# Patient Record
Sex: FEMALE | Race: WHITE | Employment: OTHER | URBAN - METROPOLITAN AREA
[De-identification: names, ages, dates, MRNs, and addresses within clinical notes are randomized per-mention and may not be internally consistent; named-entity substitution may affect disease eponyms.]

---

## 2017-01-09 DIAGNOSIS — Z98.61 S/P PTCA (PERCUTANEOUS TRANSLUMINAL CORONARY ANGIOPLASTY): Primary | ICD-10-CM

## 2017-01-09 RX ORDER — PRASUGREL 10 MG/1
10 TABLET, FILM COATED ORAL DAILY
Qty: 28 TABLET | Refills: 0 | COMMUNITY
Start: 2017-01-09 | End: 2017-03-20 | Stop reason: SDUPTHER

## 2017-01-23 ENCOUNTER — OFFICE VISIT (OUTPATIENT)
Dept: CARDIOLOGY CLINIC | Age: 67
End: 2017-01-23

## 2017-01-23 VITALS
BODY MASS INDEX: 43.05 KG/M2 | WEIGHT: 243 LBS | SYSTOLIC BLOOD PRESSURE: 122 MMHG | DIASTOLIC BLOOD PRESSURE: 70 MMHG | HEIGHT: 63 IN | HEART RATE: 64 BPM

## 2017-01-23 DIAGNOSIS — R07.9 CHEST PAIN, UNSPECIFIED TYPE: Primary | ICD-10-CM

## 2017-01-23 PROCEDURE — 99214 OFFICE O/P EST MOD 30 MIN: CPT | Performed by: INTERNAL MEDICINE

## 2017-01-23 PROCEDURE — 93000 ELECTROCARDIOGRAM COMPLETE: CPT | Performed by: INTERNAL MEDICINE

## 2017-01-23 RX ORDER — QUINAPRIL 20 MG/1
TABLET ORAL
Refills: 3 | COMMUNITY
Start: 2016-12-19 | End: 2017-01-23 | Stop reason: ALTCHOICE

## 2017-01-23 RX ORDER — MECLIZINE HYDROCHLORIDE 25 MG/1
TABLET ORAL
Refills: 3 | COMMUNITY
Start: 2016-11-09 | End: 2022-04-07

## 2017-01-23 RX ORDER — QUINAPRIL 40 MG/1
40 TABLET ORAL NIGHTLY
COMMUNITY
End: 2017-10-17 | Stop reason: SDUPTHER

## 2017-01-26 ENCOUNTER — PROCEDURE VISIT (OUTPATIENT)
Dept: CARDIOLOGY CLINIC | Age: 67
End: 2017-01-26

## 2017-01-26 DIAGNOSIS — R07.9 CHEST PAIN, UNSPECIFIED TYPE: ICD-10-CM

## 2017-01-26 DIAGNOSIS — I25.10 CORONARY ARTERY DISEASE INVOLVING NATIVE CORONARY ARTERY OF NATIVE HEART, ANGINA PRESENCE UNSPECIFIED: Primary | ICD-10-CM

## 2017-01-26 DIAGNOSIS — I25.10 CORONARY ARTERY DISEASE INVOLVING NATIVE CORONARY ARTERY OF NATIVE HEART, ANGINA PRESENCE UNSPECIFIED: ICD-10-CM

## 2017-01-26 DIAGNOSIS — R07.89 OTHER CHEST PAIN: Primary | ICD-10-CM

## 2017-01-26 LAB
LV EF: 55 %
LVEF MODALITY: NORMAL

## 2017-01-26 PROCEDURE — 93015 CV STRESS TEST SUPVJ I&R: CPT | Performed by: INTERNAL MEDICINE

## 2017-01-26 PROCEDURE — 93306 TTE W/DOPPLER COMPLETE: CPT | Performed by: INTERNAL MEDICINE

## 2017-01-26 PROCEDURE — A9500 TC99M SESTAMIBI: HCPCS | Performed by: INTERNAL MEDICINE

## 2017-01-26 PROCEDURE — 78452 HT MUSCLE IMAGE SPECT MULT: CPT | Performed by: INTERNAL MEDICINE

## 2017-01-30 ENCOUNTER — TELEPHONE (OUTPATIENT)
Dept: CARDIOLOGY CLINIC | Age: 67
End: 2017-01-30

## 2017-01-31 ENCOUNTER — OFFICE VISIT (OUTPATIENT)
Dept: CARDIOLOGY CLINIC | Age: 67
End: 2017-01-31

## 2017-01-31 VITALS
WEIGHT: 240 LBS | BODY MASS INDEX: 44.16 KG/M2 | HEIGHT: 62 IN | SYSTOLIC BLOOD PRESSURE: 138 MMHG | HEART RATE: 88 BPM | DIASTOLIC BLOOD PRESSURE: 88 MMHG

## 2017-01-31 DIAGNOSIS — Z98.61 S/P PTCA (PERCUTANEOUS TRANSLUMINAL CORONARY ANGIOPLASTY): Primary | ICD-10-CM

## 2017-01-31 PROCEDURE — 99214 OFFICE O/P EST MOD 30 MIN: CPT | Performed by: INTERNAL MEDICINE

## 2017-02-07 ENCOUNTER — TELEPHONE (OUTPATIENT)
Dept: CARDIOLOGY CLINIC | Age: 67
End: 2017-02-07

## 2017-02-20 ENCOUNTER — TELEPHONE (OUTPATIENT)
Dept: CARDIOLOGY CLINIC | Age: 67
End: 2017-02-20

## 2017-02-28 ENCOUNTER — TELEPHONE (OUTPATIENT)
Dept: CARDIOLOGY CLINIC | Age: 67
End: 2017-02-28

## 2017-03-07 ENCOUNTER — TELEPHONE (OUTPATIENT)
Dept: CARDIOLOGY CLINIC | Age: 67
End: 2017-03-07

## 2017-03-08 ENCOUNTER — HOSPITAL ENCOUNTER (OUTPATIENT)
Dept: GENERAL RADIOLOGY | Age: 67
Discharge: HOME | End: 2017-03-08
Attending: FAMILY MEDICINE | Admitting: FAMILY MEDICINE

## 2017-03-08 LAB
ALBUMIN SERPL-MCNC: 4.3 GM/DL (ref 3.4–5)
ALP BLD-CCNC: 80 IU/L (ref 40–128)
ALT SERPL-CCNC: 29 U/L (ref 10–40)
ANION GAP SERPL CALCULATED.3IONS-SCNC: 12 MMOL/L (ref 4–16)
AST SERPL-CCNC: 30 IU/L (ref 15–37)
BACTERIA: NEGATIVE /HPF
BASOPHILS ABSOLUTE: 0.1 K/CU MM
BASOPHILS RELATIVE PERCENT: 0.9 % (ref 0–1)
BILIRUB SERPL-MCNC: 0.5 MG/DL (ref 0–1)
BILIRUBIN URINE: NEGATIVE MG/DL
BLOOD, URINE: NEGATIVE
BUN BLDV-MCNC: 13 MG/DL (ref 6–23)
CALCIUM SERPL-MCNC: 9.6 MG/DL (ref 8.3–10.6)
CAST TYPE: ABNORMAL /HPF
CHLORIDE BLD-SCNC: 98 MMOL/L (ref 99–110)
CHOLESTEROL: 123 MG/DL
CLARITY: ABNORMAL
CO2: 31 MMOL/L (ref 21–32)
COLOR: YELLOW
CREAT SERPL-MCNC: 0.7 MG/DL (ref 0.6–1.1)
CRYSTAL TYPE: ABNORMAL /HPF
DIFFERENTIAL TYPE: ABNORMAL
EOSINOPHILS ABSOLUTE: 0.3 K/CU MM
EOSINOPHILS RELATIVE PERCENT: 3.1 % (ref 0–3)
EPITHELIAL CELLS, UA: 1
EPITHELIAL CELLS, UA: 25 /HPF
ESTIMATED AVERAGE GLUCOSE: 166 MG/DL
FERRITIN: 23 NG/ML (ref 15–150)
GFR AFRICAN AMERICAN: >60 ML/MIN/1.73M2
GFR NON-AFRICAN AMERICAN: >60 ML/MIN/1.73M2
GLUCOSE BLD-MCNC: 129 MG/DL (ref 70–140)
GLUCOSE, URINE: NEGATIVE MG/DL
HBA1C MFR BLD: 7.4 % (ref 4.2–6.3)
HCT VFR BLD CALC: 45.5 % (ref 37–47)
HDLC SERPL-MCNC: 47 MG/DL
HEMOGLOBIN: 13.4 GM/DL (ref 12.5–16)
IMMATURE NEUTROPHIL %: 0.3 % (ref 0–0.43)
IRON: 37 UG/DL (ref 37–145)
KETONES, URINE: NEGATIVE MG/DL
LDL CHOLESTEROL DIRECT: 61 MG/DL
LEUKOCYTE ESTERASE, URINE: ABNORMAL
LYMPHOCYTES ABSOLUTE: 3 K/CU MM
LYMPHOCYTES RELATIVE PERCENT: 32.9 % (ref 24–44)
MCH RBC QN AUTO: 25.6 PG (ref 27–31)
MCHC RBC AUTO-ENTMCNC: 29.5 % (ref 32–36)
MCV RBC AUTO: 87 FL (ref 78–100)
MONOCYTES ABSOLUTE: 0.8 K/CU MM
MONOCYTES RELATIVE PERCENT: 8.3 % (ref 0–4)
MUCUS: ABNORMAL HPF
NITRITE URINE, QUANTITATIVE: NEGATIVE
NUCLEATED RBC %: 0 %
PCT TRANSFERRIN: 10 % (ref 10–44)
PDW BLD-RTO: 15.1 % (ref 11.7–14.9)
PH, URINE: 7 (ref 5–8)
PLATELET # BLD: 232 K/CU MM (ref 140–440)
PMV BLD AUTO: 12.1 FL (ref 7.5–11.1)
POTASSIUM SERPL-SCNC: 4.5 MMOL/L (ref 3.5–5.1)
PROTEIN UA: 30 MG/DL
RBC # BLD: 5.23 M/CU MM (ref 4.2–5.4)
RBC URINE: 7 /HPF (ref 0–6)
SEGMENTED NEUTROPHILS ABSOLUTE COUNT: 5 K/CU MM
SEGMENTED NEUTROPHILS RELATIVE PERCENT: 54.5 % (ref 36–66)
SODIUM BLD-SCNC: 141 MMOL/L (ref 135–145)
SPECIFIC GRAVITY UA: 1.01 (ref 1–1.03)
TOTAL IMMATURE NEUTOROPHIL: 0.03 K/CU MM
TOTAL IRON BINDING CAPACITY: 364 UG/DL (ref 250–450)
TOTAL NUCLEATED RBC: 0 K/CU MM
TOTAL PROTEIN: 6.8 GM/DL (ref 6.4–8.2)
TRIGL SERPL-MCNC: 156 MG/DL
UNSATURATED IRON BINDING CAPACITY: 327 UG/DL (ref 110–370)
UROBILINOGEN, URINE: 0.2 EU/DL (ref 0.2–1)
VITAMIN B-12: 290.7 PG/ML (ref 211–911)
VITAMIN D 25-HYDROXY: 18.95 NG/ML
VOLUME, (UVOL): 12 ML (ref 10–12)
WBC # BLD: 9.1 K/CU MM (ref 4–10.5)
WBC UA: 15 /HPF (ref 0–5)

## 2017-03-10 LAB
CULTURE: NORMAL
REPORT STATUS: NORMAL
REQUEST PROBLEM: NORMAL
SPECIMEN: NORMAL
TOTAL COLONY COUNT: NORMAL

## 2017-03-11 LAB — VITAMIN B6: 33.8

## 2017-03-13 ENCOUNTER — HOSPITAL ENCOUNTER (OUTPATIENT)
Dept: GENERAL RADIOLOGY | Age: 67
Discharge: HOME | End: 2017-03-13
Attending: INTERNAL MEDICINE | Admitting: INTERNAL MEDICINE

## 2017-03-13 DIAGNOSIS — Z01.811 PRE-OP CHEST EXAM: ICD-10-CM

## 2017-03-13 LAB
ANION GAP SERPL CALCULATED.3IONS-SCNC: 12 MMOL/L (ref 4–16)
APTT: 23.3 SECONDS (ref 21.2–33)
BUN BLDV-MCNC: 10 MG/DL (ref 6–23)
CALCIUM SERPL-MCNC: 9.5 MG/DL (ref 8.3–10.6)
CHLORIDE BLD-SCNC: 99 MMOL/L (ref 99–110)
CO2: 30 MMOL/L (ref 21–32)
CREAT SERPL-MCNC: 0.7 MG/DL (ref 0.6–1.1)
GFR AFRICAN AMERICAN: >60 ML/MIN/1.73M2
GFR NON-AFRICAN AMERICAN: >60 ML/MIN/1.73M2
GLUCOSE BLD-MCNC: 136 MG/DL (ref 70–140)
HCT VFR BLD CALC: 42.7 % (ref 37–47)
HEMOGLOBIN: 12.8 GM/DL (ref 12.5–16)
INR BLD: 0.97 INDEX
MCH RBC QN AUTO: 26 PG (ref 27–31)
MCHC RBC AUTO-ENTMCNC: 30 % (ref 32–36)
MCV RBC AUTO: 86.8 FL (ref 78–100)
PDW BLD-RTO: 15.2 % (ref 11.7–14.9)
PLATELET # BLD: 222 K/CU MM (ref 140–440)
PMV BLD AUTO: 12.3 FL (ref 7.5–11.1)
POTASSIUM SERPL-SCNC: 4.5 MMOL/L (ref 3.5–5.1)
PROTHROMBIN TIME: 11.1 SECONDS
RBC # BLD: 4.92 M/CU MM (ref 4.2–5.4)
SODIUM BLD-SCNC: 141 MMOL/L (ref 135–145)
WBC # BLD: 6.8 K/CU MM (ref 4–10.5)

## 2017-03-14 ENCOUNTER — TELEPHONE (OUTPATIENT)
Dept: CARDIOLOGY CLINIC | Age: 67
End: 2017-03-14

## 2017-03-20 ENCOUNTER — OFFICE VISIT (OUTPATIENT)
Dept: CARDIOLOGY CLINIC | Age: 67
End: 2017-03-20

## 2017-03-20 VITALS
SYSTOLIC BLOOD PRESSURE: 128 MMHG | BODY MASS INDEX: 44.16 KG/M2 | OXYGEN SATURATION: 98 % | DIASTOLIC BLOOD PRESSURE: 80 MMHG | WEIGHT: 240 LBS | HEIGHT: 62 IN | HEART RATE: 62 BPM

## 2017-03-20 DIAGNOSIS — I25.10 CORONARY ARTERY DISEASE INVOLVING NATIVE CORONARY ARTERY OF NATIVE HEART WITHOUT ANGINA PECTORIS: ICD-10-CM

## 2017-03-20 DIAGNOSIS — Z98.890 S/P CARDIAC CATH: Primary | ICD-10-CM

## 2017-03-20 DIAGNOSIS — E78.5 HYPERLIPIDEMIA, UNSPECIFIED HYPERLIPIDEMIA TYPE: ICD-10-CM

## 2017-03-20 DIAGNOSIS — E11.9 TYPE 2 DIABETES MELLITUS WITHOUT COMPLICATION, WITHOUT LONG-TERM CURRENT USE OF INSULIN (HCC): ICD-10-CM

## 2017-03-20 DIAGNOSIS — Z98.61 S/P PTCA (PERCUTANEOUS TRANSLUMINAL CORONARY ANGIOPLASTY): ICD-10-CM

## 2017-03-20 DIAGNOSIS — I10 ESSENTIAL HYPERTENSION: ICD-10-CM

## 2017-03-20 PROCEDURE — 99214 OFFICE O/P EST MOD 30 MIN: CPT | Performed by: INTERNAL MEDICINE

## 2017-03-20 RX ORDER — PRASUGREL 10 MG/1
10 TABLET, FILM COATED ORAL DAILY
Qty: 96 TABLET | Refills: 0 | COMMUNITY
Start: 2017-03-20 | End: 2017-06-01 | Stop reason: SDUPTHER

## 2017-04-03 ENCOUNTER — TELEPHONE (OUTPATIENT)
Dept: GASTROENTEROLOGY | Age: 67
End: 2017-04-03

## 2017-04-05 ENCOUNTER — OFFICE VISIT (OUTPATIENT)
Dept: ORTHOPEDIC SURGERY | Age: 67
End: 2017-04-05

## 2017-04-05 VITALS — BODY MASS INDEX: 44.16 KG/M2 | WEIGHT: 240 LBS | HEIGHT: 62 IN | RESPIRATION RATE: 16 BRPM

## 2017-04-05 DIAGNOSIS — M16.12 ARTHRITIS OF LEFT HIP: Primary | ICD-10-CM

## 2017-04-05 PROBLEM — F99 MENTAL DISORDER: Status: ACTIVE | Noted: 2017-04-05

## 2017-04-05 PROBLEM — N28.9 KIDNEY DISORDER: Status: ACTIVE | Noted: 2017-04-05

## 2017-04-05 PROCEDURE — 99213 OFFICE O/P EST LOW 20 MIN: CPT | Performed by: ORTHOPAEDIC SURGERY

## 2017-05-01 ENCOUNTER — TELEPHONE (OUTPATIENT)
Dept: GASTROENTEROLOGY | Age: 67
End: 2017-05-01

## 2017-05-26 ENCOUNTER — TELEPHONE (OUTPATIENT)
Dept: GASTROENTEROLOGY | Age: 67
End: 2017-05-26

## 2017-05-31 ENCOUNTER — TELEPHONE (OUTPATIENT)
Dept: CARDIOLOGY CLINIC | Age: 67
End: 2017-05-31

## 2017-05-31 NOTE — TELEPHONE ENCOUNTER
Dr. Handley Going is on vacation so I will talk to one of the Doctors here to get an answer for this pt.

## 2017-06-01 NOTE — TELEPHONE ENCOUNTER
I found some more samples of Effient so pt is going to picked them up tomorrow and she knows I will ask Dr. Pop Bears about her stopping Effient or changing it to another med after he returns from vacation.

## 2017-06-06 RX ORDER — PRASUGREL 10 MG/1
10 TABLET, FILM COATED ORAL DAILY
Qty: 28 TABLET | Refills: 0 | COMMUNITY
Start: 2017-06-06 | End: 2017-06-13 | Stop reason: ALTCHOICE

## 2017-06-16 ENCOUNTER — HOSPITAL ENCOUNTER (OUTPATIENT)
Dept: GENERAL RADIOLOGY | Age: 67
Discharge: HOME | End: 2017-06-16
Attending: FAMILY MEDICINE | Admitting: FAMILY MEDICINE

## 2017-06-16 LAB
ALBUMIN SERPL-MCNC: 3.7 GM/DL (ref 3.4–5)
ALP BLD-CCNC: 90 IU/L (ref 40–129)
ALT SERPL-CCNC: 20 U/L (ref 10–40)
ANION GAP SERPL CALCULATED.3IONS-SCNC: 11 MMOL/L (ref 4–16)
AST SERPL-CCNC: 17 IU/L (ref 15–37)
BILIRUB SERPL-MCNC: 0.4 MG/DL (ref 0–1)
BUN BLDV-MCNC: 17 MG/DL (ref 6–23)
CALCIUM SERPL-MCNC: 9.3 MG/DL (ref 8.3–10.6)
CHLORIDE BLD-SCNC: 101 MMOL/L (ref 99–110)
CHOLESTEROL: 117 MG/DL
CO2: 31 MMOL/L (ref 21–32)
CREAT SERPL-MCNC: 0.7 MG/DL (ref 0.6–1.1)
ESTIMATED AVERAGE GLUCOSE: 163 MG/DL
GFR AFRICAN AMERICAN: >60 ML/MIN/1.73M2
GFR NON-AFRICAN AMERICAN: >60 ML/MIN/1.73M2
GLUCOSE BLD-MCNC: 157 MG/DL (ref 70–140)
HBA1C MFR BLD: 7.3 % (ref 4.2–6.3)
HDLC SERPL-MCNC: 56 MG/DL
LDL CHOLESTEROL DIRECT: 53 MG/DL
POTASSIUM SERPL-SCNC: 4.4 MMOL/L (ref 3.5–5.1)
SODIUM BLD-SCNC: 143 MMOL/L (ref 135–145)
T3 FREE: 2.7 PG/ML (ref 2.3–4.2)
T4 FREE: 1.69 NG/DL (ref 0.9–1.8)
TOTAL PROTEIN: 6.2 GM/DL (ref 6.4–8.2)
TRIGL SERPL-MCNC: 96 MG/DL
TSH HIGH SENSITIVITY: 0.39 UIU/ML (ref 0.27–4.2)
VITAMIN D 25-HYDROXY: 16.36 NG/ML

## 2017-10-17 ENCOUNTER — OFFICE VISIT (OUTPATIENT)
Dept: CARDIOLOGY CLINIC | Age: 67
End: 2017-10-17

## 2017-10-17 VITALS
DIASTOLIC BLOOD PRESSURE: 72 MMHG | HEIGHT: 63 IN | BODY MASS INDEX: 42.52 KG/M2 | SYSTOLIC BLOOD PRESSURE: 122 MMHG | WEIGHT: 240 LBS | OXYGEN SATURATION: 98 % | HEART RATE: 67 BPM

## 2017-10-17 DIAGNOSIS — E11.9 TYPE 2 DIABETES MELLITUS WITHOUT COMPLICATION, WITHOUT LONG-TERM CURRENT USE OF INSULIN (HCC): ICD-10-CM

## 2017-10-17 DIAGNOSIS — I25.10 CORONARY ARTERY DISEASE INVOLVING NATIVE CORONARY ARTERY OF NATIVE HEART WITHOUT ANGINA PECTORIS: Primary | ICD-10-CM

## 2017-10-17 DIAGNOSIS — Z98.61 S/P PTCA (PERCUTANEOUS TRANSLUMINAL CORONARY ANGIOPLASTY): ICD-10-CM

## 2017-10-17 DIAGNOSIS — E78.49 OTHER HYPERLIPIDEMIA: ICD-10-CM

## 2017-10-17 DIAGNOSIS — I10 ESSENTIAL HYPERTENSION: ICD-10-CM

## 2017-10-17 PROCEDURE — 99214 OFFICE O/P EST MOD 30 MIN: CPT | Performed by: INTERNAL MEDICINE

## 2017-10-17 RX ORDER — ATORVASTATIN CALCIUM 20 MG/1
20 TABLET, FILM COATED ORAL NIGHTLY
Qty: 90 TABLET | Refills: 3 | Status: SHIPPED | OUTPATIENT
Start: 2017-10-17

## 2017-10-17 RX ORDER — CARVEDILOL 12.5 MG/1
12.5 TABLET ORAL 2 TIMES DAILY WITH MEALS
Qty: 1180 TABLET | Refills: 3 | Status: SHIPPED | OUTPATIENT
Start: 2017-10-17

## 2017-10-17 RX ORDER — QUINAPRIL 40 MG/1
40 TABLET ORAL NIGHTLY
Qty: 90 TABLET | Refills: 3 | Status: SHIPPED | OUTPATIENT
Start: 2017-10-17

## 2017-10-17 RX ORDER — HYDROCHLOROTHIAZIDE 25 MG/1
25 TABLET ORAL DAILY
Qty: 90 TABLET | Refills: 3 | Status: SHIPPED | OUTPATIENT
Start: 2017-10-17

## 2017-10-17 NOTE — PROGRESS NOTES
Patient's medical history is documented as below. Patient confirms taking her medication as prescribed. Labs and recent testing results have been reviewed. She she is here for 6 months follow-up  She has coronary artery disease, status post PTCA, hypertension, hyperlipidemia and type 2 diabetes mellitus. Denies chest pain or shortness of breath. Denies palpitations. Reviewed her current medications and labs. ROS    Constitutional:  Denies fever, chills, weight loss or weakness. HENT:  Denies sore throat or ear pain. Respiratory:  Denies cough or shortness of breath. Cardiovascular:  Denies chest pain, palpitations or swelling. GI:  Denies abdominal pain, nausea, vomiting, or diarrhea. Musculoskeletal:  Denies back pain. Skin:  Denies rash. Neurologic:  Denies headache, focal weakness or sensory changes. Endocrine:  Denies polyuria or polydipsia. Lymphatic:  Denies swollen glands. All other systems in a complete (14 organ system) ROS, except for pertinent positives and/or negatives as noted in the HPI (or elsewhere in my note), have been reviewed and are negative. PAST MEDICAL HISTORY    Past Medical History:   Diagnosis Date    Acid reflux     Angina at rest [facility]) In Past    Denies Chest Pain At This Time    Arthritis     \"Knees, Back And Hips\"    Broken teeth     Upper    CAD (coronary artery disease)     Sees Dr. Maxim Mcgill Chronic back pain     Degenerative joint disease     Back     Diabetes mellitus ([facility] Dx 2003    GI bleed 11-09    H/O echocardiogram 07/14/2014    EF 50%, low normal systolic function, no wall motion abnormalities, mild concentric hypertrophy and signs of diastolic dysfunction, mildly dilated left atrium,  no pericardial effusion, mild mitral and tricuspid regurg.     Hiatal hernia     History of complete ECG     07/20/2012=NSR, leftward axis, prob normal for age, poor r-waveprogression, inferior ST-T abnormalities, consider ischemia    History of echocardiogram 01/26/2017    Ejection fraction is visually estimated at 55%. Mild concentric left ventricular hypertrophy. Mildly dilated left atrium. Mild mitral regurgitation is present.  History of nuclear stress test 01/26/2017    cardiolite-moderate ischemia mid anteroseptal,EF70%    Hx of 24 hour EKG monitoring 12/21/2011    brief run of SVT, otherwise noth clinically sig. arrhythmia noted    Hx of cardiovascular stress test 7/25/14 1/23/12 7/14 EF70% normal study 1/23/12=thallium stress=no scintigraphic inducible myocardial ischemia, EF 70%;  03/2011=EF 70%; 03/2010=EF 70%; 12/1997; 02/1997    Hx of CT scan     05/08/2011=CT chest with contrast=examination is moderately degraded by respiratory motion. No evidence for acute pulmonary thromboembolic disease.  Dilated main pulmonary artery suggesting elevated pulm artery pressures    Hx of Doppler ultrasound     bilateral carotid 03/11/2011= no significant stenosis present    Hx of echocardiogram     03/11/2011=mildly hypertreophied left ventricle, EF 60%, mild pulm. htn, mild aortic stenosis; 02/1997   [facility] OTHER MEDICAL     Primary Care Physician Is Dr. Chapito Peoples Hyperlipidemia     Hypertension     Hyperthyroidism     Hypothyroidism, adult     Kidney stones Last Episode In 2000's    \"Passed Kidney Stones Three Different Times\"    Left hip pain     \"for months- had hip pain\"for steroid injection 7/5/2016    Migraines     Normal cardiac stress test 05/12/16    EF70% Normal    Post PTCA     2008, 2010, 2016    RECEIVED BLOOD TRANSFUSION 11-09    RECEIVED BLOOD TRANSFUSION, NO REACTION TO THE BLOOD TRANSFUSION RECEIVED    Shortness of breath on exertion     Sleep apnea     NO CPAP    Teeth missing     Upper And Lower    Ulcer ([address] 11-09    Stomach, GI Bleeding, Received Blood Transfusions    Urinary incontinence     UTI (urinary tract infection) In Past     No Current Symptoms    Wears glasses     Wears partial dentures     Upper       MEDICATIONS Current Outpatient Rx   Medication Sig Dispense Refill    metFORMIN (GLUCOPHAGE) 500 MG tablet TK 2 T PO QAM AND 3 T PO QPM WITH MEALS  11    meclizine (ANTIVERT) 25 MG tablet TK 1 T PO  Q 4 H PRF  DIZZINESS  3    quinapril (ACCUPRIL) 40 MG tablet Take 40 mg by mouth nightly      B Complex Vitamins (VITAMIN B COMPLEX PO) Take by mouth      vitamin D (CHOLECALCIFEROL) 1000 UNIT TABS tablet Take 1,000 Units by mouth daily      ondansetron (ZOFRAN) 4 MG tablet Take 1 tablet by mouth every 8 hours as needed for Nausea or Vomiting 30 tablet 3    atorvastatin (LIPITOR) 20 MG tablet Take 1 tablet by mouth nightly 30 tablet 11    ranitidine (ZANTAC) 300 MG tablet TAKE 1 TABLET BY MOUTH EVERY NIGHT 30 tablet 5    HYDROcodone-acetaminophen (NORCO) 5-325 MG per tablet Take 1 tablet by mouth every 6 hours as needed   0    carvedilol (COREG) 12.5 MG tablet Take 12.5 mg by mouth 2 times daily (with meals)      albuterol (PROVENTIL HFA;VENTOLIN HFA) 108 (90 BASE) MCG/ACT inhaler Inhale 2 puffs into the lungs every 6 hours as needed for Wheezing      pantoprazole (PROTONIX) 40 MG tablet Take 40 mg by mouth 2 times daily   2    aspirin 81 MG tablet Take 81 mg by mouth every morning. Last Dose Taken 9-2-14 Due To Scheduled Surgery      glipiZIDE (GLUCOTROL) 5 MG tablet Take 5 mg by mouth 2 times daily (before meals).  levothyroxine (SYNTHROID) 150 MCG tablet Take 150 mcg by mouth nightly.  hydrochlorothiazide (HYDRODIURIL) 25 MG tablet Take 25 mg by mouth daily Takes 12.5 daily 1/2 of a 25 mg tablet      sitaGLIPtan (JANUVIA) 100 MG tablet Take 100 mg by mouth every morning.          ALLERGIES    Allergies   Allergen Reactions    Codeine Nausea Only       FAMILY HISTORY    Family History   Problem Relation Age of Onset    Cancer Mother      Liver Cancer    Heart Disease Mother     High Blood Pressure Mother     Asthma Mother     Cancer Father      Colon Cancer    Heart Disease Father     High Blood Pressure Father     Arthritis Sister     Early Death Sister 52    High Blood Pressure Sister     Other Sister      \"Breathing Problem\"    Heart Disease Son      Open Heart Surgery    Diabetes Son     High Blood Pressure Son     Mental Illness Daughter      Bipolar    Early Death Sister 61     Liver Cancer    Cancer Sister      Liver Cancer       SURGICAL HISTORY    Past Surgical History:   Procedure Laterality Date    CARPAL TUNNEL RELEASE Bilateral  Right     1989 Left     SECTION  2835,     Tubal Ligation Also Done In  With    [address]  Last Done In 's    COLONOSCOPY  3/23/15    Internal hemorrhoids    CORONARY ANGIOPLASTY  2010    balloon angioplasty of distal mid LAD;     CORONARY ANGIOPLASTY  2008    LAD stent 2.75x16 taxus    CORONARY ANGIOPLASTY  2008    2. 5x8 taxus stent to the ostium of the circ.  DENTAL SURGERY      Teeth Extracted In Past    DIAGNOSTIC CARDIAC CATH LAB PROCEDURE  2010    balloon angioplasty of distal mid LAD;     DIAGNOSTIC CARDIAC CATH LAB PROCEDURE  2008    LAD stent 2.75x16 taxus    DIAGNOSTIC CARDIAC CATH LAB PROCEDURE  2008    2. 5x8 taxus stent to the ostium of the circ.     ENDOSCOPY, COLON, DIAGNOSTIC  \"Several\"    ENDOSCOPY, COLON, DIAGNOSTIC  3/23/15    small hiatal hernia, anatomy consistent with banding, gastritis    FINGER TRIGGER RELEASE  10-09 \"One On Each Hand Trigger Finger Release\"    - \"Left Middle Finger Trigger Finger Release\"    HYSTERECTOMY      Partial Abdominal Hysterectomy, \"Pinned The Bladder Up\"    JOINT REPLACEMENT Right     Total Right Hip    JOINT REPLACEMENT Right     Total Right Knee    JOINT REPLACEMENT Left     Total Left Knee    JOINT REPLACEMENT Left     Revision Total Left Knee    KNEE SURGERY Right ,     KNEE SURGERY Left     Left Knee Bone Graft    KNEE SURGERY Right     Right Knee Bone Graft    OTHER SURGICAL HISTORY      \"Stomach Stapling\" Pre Surgery Weight Was 230 lbs    OTHER SURGICAL HISTORY Left 04/10/2017    left hip steroid injection    PTCA  16    Stenting of the LAD.  SHOULDER ARTHROSCOPY Right 2-10    SHOULDER ARTHROSCOPY Left 09/10/14    TONSILLECTOMY AND ADENOIDECTOMY  1964    TUBAL LIGATION  1975    Done With     WISDOM TOOTH EXTRACTION      [place] Teeth Extracted In Past       PHYSICAL EXAM    Vital Signs:  /72   Pulse 67   Ht 5' 3\" (1.6 m)   Wt 240 lb (108.9 kg)   SpO2 98%   BMI 42.51 kg/m²   Constitutional:  Well developed, well nourished, no acute distress, non-toxic appearance. HENT:  Normocephalic, atraumatic, bilateral external ears normal, oropharynx moist, no oral exudates, Nose normal. Neck- normal range of motion, no tenderness, supple, no stridor. Eyes:  PERRL, EOMI, conjunctiva normal, no discharge. Respiratory:  Lungs are clear to auscultation  Cardiovascular:  Regular rhythm no murmur rub or gallop  GI:  Bowel sounds normal, Soft, no tenderness, no masses, no pulsatile masses. Integument:  Warm, dry, no erythema, no rash. Back:  No tenderness, no CVA tenderness. Musculoskeletal:  Intact distal pulses, no edema, no tenderness, no cyanosis, no clubbing. Good range of motion in all major joints. No tenderness to palpation or major deformities noted. Back- No tenderness. Neurologic:  Alert & oriented x 3, normal motor function, normal sensory function, no focal deficits noted. Psychiatric:  Affect normal, judgment normal, mood normal.     AssessmenT    1. Coronary artery disease involving native coronary artery of native heart without angina pectoris    2. S/P PTCA (percutaneous transluminal coronary angioplasty)    3. Type 2 diabetes mellitus without complication, without long-term current use of insulin ([address]    4. Essential hypertension    5.  Other hyperlipidemia        Plan  Continue current medical

## 2017-12-21 ENCOUNTER — TELEPHONE (OUTPATIENT)
Dept: CARDIOLOGY CLINIC | Age: 67
End: 2017-12-21

## 2018-02-28 ENCOUNTER — OFFICE VISIT (OUTPATIENT)
Dept: ORTHOPEDIC SURGERY | Age: 68
End: 2018-02-28

## 2018-02-28 ENCOUNTER — TELEPHONE (OUTPATIENT)
Dept: ORTHOPEDIC SURGERY | Age: 68
End: 2018-02-28

## 2018-02-28 VITALS — RESPIRATION RATE: 16 BRPM | HEIGHT: 63 IN | WEIGHT: 240 LBS | BODY MASS INDEX: 42.52 KG/M2

## 2018-02-28 DIAGNOSIS — R52 PAIN: ICD-10-CM

## 2018-02-28 DIAGNOSIS — M16.12 ARTHRITIS OF LEFT HIP: Primary | ICD-10-CM

## 2018-02-28 PROCEDURE — 99214 OFFICE O/P EST MOD 30 MIN: CPT | Performed by: ORTHOPAEDIC SURGERY

## 2018-02-28 PROCEDURE — 1123F ACP DISCUSS/DSCN MKR DOCD: CPT | Performed by: ORTHOPAEDIC SURGERY

## 2018-02-28 PROCEDURE — 1036F TOBACCO NON-USER: CPT | Performed by: ORTHOPAEDIC SURGERY

## 2018-02-28 PROCEDURE — 3017F COLORECTAL CA SCREEN DOC REV: CPT | Performed by: ORTHOPAEDIC SURGERY

## 2018-02-28 PROCEDURE — G8598 ASA/ANTIPLAT THER USED: HCPCS | Performed by: ORTHOPAEDIC SURGERY

## 2018-02-28 PROCEDURE — 4040F PNEUMOC VAC/ADMIN/RCVD: CPT | Performed by: ORTHOPAEDIC SURGERY

## 2018-02-28 PROCEDURE — 3014F SCREEN MAMMO DOC REV: CPT | Performed by: ORTHOPAEDIC SURGERY

## 2018-02-28 PROCEDURE — G8417 CALC BMI ABV UP PARAM F/U: HCPCS | Performed by: ORTHOPAEDIC SURGERY

## 2018-02-28 PROCEDURE — G8427 DOCREV CUR MEDS BY ELIG CLIN: HCPCS | Performed by: ORTHOPAEDIC SURGERY

## 2018-02-28 PROCEDURE — G8484 FLU IMMUNIZE NO ADMIN: HCPCS | Performed by: ORTHOPAEDIC SURGERY

## 2018-02-28 PROCEDURE — G8399 PT W/DXA RESULTS DOCUMENT: HCPCS | Performed by: ORTHOPAEDIC SURGERY

## 2018-02-28 PROCEDURE — 1090F PRES/ABSN URINE INCON ASSESS: CPT | Performed by: ORTHOPAEDIC SURGERY

## 2018-02-28 NOTE — PROGRESS NOTES
test 2017    cardiolite-moderate ischemia mid anteroseptal,EF70%    Hx of 24 hour EKG monitoring 2011    brief run of SVT, otherwise noth clinically sig. arrhythmia noted    Hx of cardiovascular stress test 14 EF70% normal study 12=thallium stress=no scintigraphic inducible myocardial ischemia, EF 70%;  2011=EF 70%; 2010=EF 70%; 1997; 1997    Hx of CT scan     2011=CT chest with contrast=examination is moderately degraded by respiratory motion. No evidence for acute pulmonary thromboembolic disease.  Dilated main pulmonary artery suggesting elevated pulm artery pressures    Hx of Doppler ultrasound     bilateral carotid 2011= no significant stenosis present    Hx of echocardiogram     2011=mildly hypertreophied left ventricle, EF 60%, mild pulm. htn, mild aortic stenosis; 1997   [facility] OTHER MEDICAL     Primary Care Physician Is Dr. Bora Trujillo Hyperlipidemia     Hypertension     Hyperthyroidism     Hypothyroidism, adult     Kidney stones Last Episode In     \"Passed Kidney Stones Three Different Times\"    Left hip pain     \"for months- had hip pain\"for steroid injection 2016    Migraines     Normal cardiac stress test 16    EF70% Normal    Post PTCA     , ,     RECEIVED BLOOD TRANSFUSION     RECEIVED BLOOD TRANSFUSION, NO REACTION TO THE BLOOD TRANSFUSION RECEIVED    Shortness of breath on exertion     Sleep apnea     NO CPAP    Teeth missing     Upper And Lower    Ulcer ([address]     Stomach, GI Bleeding, Received Blood Transfusions    Urinary incontinence     UTI (urinary tract infection) In Past     No Current Symptoms    Wears glasses     Wears partial dentures     Upper       Past Surgical History:   Procedure Laterality Date    CARPAL TUNNEL RELEASE Bilateral  Right     1989 Left     SECTION  ,     Tubal Ligation Also Done In  With     CHOLECYSTECTOMY Mother     Asthma Mother     Cancer Father      Colon Cancer    Heart Disease Father     High Blood Pressure Father     Arthritis Sister     Early Death Sister 52    High Blood Pressure Sister     Other Sister      \"Breathing Problem\"    Heart Disease Son      Open Heart Surgery    Diabetes Son     High Blood Pressure Son     Mental Illness Daughter      Bipolar    Early Death Sister 61     Liver Cancer    Cancer Sister      Liver Cancer       Social History     Social History    Marital status:      Spouse name: N/A    Number of children: N/A    Years of education: N/A     Social History Main Topics    Smoking status: Former Smoker     Packs/day: 0.25     Years: 38.00     Start date: 9/5/1962     Quit date: 9/5/2000    Smokeless tobacco: Never Used      Comment: \"never a heavy smoker just occ smoker\"    Alcohol use No    Drug use: No      Comment: \"When I Quit I Was Smoking About 2 Two Packs Of Cigarettes A Week\"    Sexual activity: Yes     Partners: Male     Other Topics Concern    None     Social History Narrative    None         ORTHOPEDIC CONSTITUTION EXAM:     Vital Signs:  Resp 16   Ht 5' 3\" (1.6 m)   Wt 240 lb (108.9 kg)   BMI 42.51 kg/m²     Constitution:  Generally, the patient is [x] alert, [x] appears stated age, and [x] in no distress. General body habitus is:   []Cachectic  []Thin  []Normal  []Overweight  []Obese  [x]Morbidly Obese     Psychiatric:   Judgement and insight is:  [x]Good    []Poor  Oriented to:  [x]person, [x]place, [x]time. Mood for circumstances is:  [x]Appropriate   []Inappropriate    Gait and Station:   Gait is:  [x]Normal  []Antalgic   []Trendelenburg   []Wide   []Unsteady   []Other:  Assistive Device:  []Cane    []Crutches    []Walker    []Wheelchair    []Gurney  Weight bearing on injured extremity:  [x]Is able   []Is not able        ORTHOPEDIC HIP EXAM:     HIP EXAM:  []Right [x]Left  Circulation:  [x] The limb is warm and well perfused.   [x] Other: [] []      Contralateral Examination:  Examination of the contralateral side is performed for comparison. Unless otherwise specified above, examination of the area does not show any tenderness, deformity or injury. Range of motion is unremarkable. There is no gross instability. There are no rashes, ulcerations or lesions. Strength and tone are normal.      MEDICAL DATA:   Hip x-ray:  Pelvis x-ray:  AP view of the pelvis is  reviewed and show a right total hip arthroplasty in place without signs of complication  The left FA joint has mild narrowing with small rimming osteophytes. Mild worsening since her images about 1 year ago. Leg lengths are equal as measured at the level of the lesser trochanter. Hip x-ray:  2 view(s) of the left hip were  reviewed and show mild joint space narrowing with small rimming osteophytes and mild subchondral sclerosis in the weight bearing dome of the acetabulum. Mild worsening as compared with images from about 1 year ago. Imaging reviewed on today's visit:  Pelvis x-ray:  AP view of the pelvis is  reviewed and show a right total hip arthroplasty in place without signs of complication  The left FA joint has mild narrowing with small rimming osteophytes. No significant worsening since her images about 1 year ago. Leg lengths are equal as measured at the level of the lesser trochanter. Hip x-ray:  2 view(s) of the left hip were  reviewed and show mild joint space narrowing with small rimming osteophytes and mild subchondral sclerosis in the weight bearing dome of the acetabulum. No significant worsening as compared with images from about 1 year ago. ASSESSMENT:     1. Arthritis of left hip     2. Pain  XR HIP LEFT (2-3 VIEWS)         PLAN:   - discussed surgery  - will need medical clearance Jason Matias MD)  -will need dental clearance   - cardiac clearance (Dr. Manuel Menchaca)  - return to the office after her trip in June with clearances received.      Informed

## 2018-02-28 NOTE — PATIENT INSTRUCTIONS
PLAN:   - discussed surgery  - will need medical clearance Cheo Pham MD)  -will need dental clearance   - cardiac clearance (Dr. Bashir Valladares)  - return to the office after her trip in June with clearances received.

## 2018-03-12 ENCOUNTER — HOSPITAL ENCOUNTER (OUTPATIENT)
Dept: WOMENS IMAGING | Age: 68
Discharge: HOME | End: 2018-03-12
Attending: ORTHOPAEDIC SURGERY | Admitting: FAMILY MEDICINE

## 2018-03-12 DIAGNOSIS — Z12.31 VISIT FOR SCREENING MAMMOGRAM: ICD-10-CM

## 2018-03-12 DIAGNOSIS — N95.8 OTHER SPECIFIED MENOPAUSAL AND PERIMENOPAUSAL DISORDERS (CODE): ICD-10-CM

## 2018-03-12 DIAGNOSIS — N95.8 ARTIFICIAL MENOPAUSE: ICD-10-CM

## 2018-07-03 ENCOUNTER — OFFICE VISIT (OUTPATIENT)
Dept: CARDIOLOGY CLINIC | Age: 68
End: 2018-07-03

## 2018-07-03 VITALS
BODY MASS INDEX: 43.98 KG/M2 | HEART RATE: 72 BPM | WEIGHT: 239 LBS | SYSTOLIC BLOOD PRESSURE: 120 MMHG | DIASTOLIC BLOOD PRESSURE: 70 MMHG | HEIGHT: 62 IN

## 2018-07-03 DIAGNOSIS — R07.9 CHEST PAIN, UNSPECIFIED TYPE: Primary | ICD-10-CM

## 2018-07-03 PROCEDURE — 1123F ACP DISCUSS/DSCN MKR DOCD: CPT | Performed by: INTERNAL MEDICINE

## 2018-07-03 PROCEDURE — G8598 ASA/ANTIPLAT THER USED: HCPCS | Performed by: INTERNAL MEDICINE

## 2018-07-03 PROCEDURE — G8427 DOCREV CUR MEDS BY ELIG CLIN: HCPCS | Performed by: INTERNAL MEDICINE

## 2018-07-03 PROCEDURE — 3017F COLORECTAL CA SCREEN DOC REV: CPT | Performed by: INTERNAL MEDICINE

## 2018-07-03 PROCEDURE — G8417 CALC BMI ABV UP PARAM F/U: HCPCS | Performed by: INTERNAL MEDICINE

## 2018-07-03 PROCEDURE — 1036F TOBACCO NON-USER: CPT | Performed by: INTERNAL MEDICINE

## 2018-07-03 PROCEDURE — 99214 OFFICE O/P EST MOD 30 MIN: CPT | Performed by: INTERNAL MEDICINE

## 2018-07-03 PROCEDURE — 1090F PRES/ABSN URINE INCON ASSESS: CPT | Performed by: INTERNAL MEDICINE

## 2018-07-03 PROCEDURE — 93000 ELECTROCARDIOGRAM COMPLETE: CPT | Performed by: INTERNAL MEDICINE

## 2018-07-03 PROCEDURE — G8399 PT W/DXA RESULTS DOCUMENT: HCPCS | Performed by: INTERNAL MEDICINE

## 2018-07-03 PROCEDURE — 4040F PNEUMOC VAC/ADMIN/RCVD: CPT | Performed by: INTERNAL MEDICINE

## 2018-07-03 RX ORDER — FERROUS SULFATE 325(65) MG
325 TABLET ORAL
COMMUNITY
End: 2019-01-28 | Stop reason: ALTCHOICE

## 2018-07-03 NOTE — PROGRESS NOTES
55%.Mild concentric left ventricular hypertrophy. Mildly dilated left atrium. Mild mitral regurgitation is present.  History of nuclear stress test 01/26/2017    cardiolite-moderate ischemia mid anteroseptal,EF70%    Hx of 24 hour EKG monitoring 12/21/2011    brief run of SVT, otherwise noth clinically sig. arrhythmia noted    Hx of cardiovascular stress test 7/25/14 1/23/12 7/14 EF70% normal study 1/23/12=thallium stress=no scintigraphic inducible myocardial ischemia, EF 70%;  03/2011=EF 70%; 03/2010=EF 70%; 12/1997; 02/1997    Hx of CT scan     05/08/2011=CT chest with contrast=examination is moderately degraded by respiratory motion. No evidence for acute pulmonary thromboembolic disease.  Dilated main pulmonary artery suggesting elevated pulm artery pressures    Hx of Doppler ultrasound     bilateral carotid 03/11/2011= no significant stenosis present    Hx of echocardiogram     03/11/2011=mildly hypertreophied left ventricle, EF 60%, mild pulm. htn, mild aortic stenosis; 02/1997   [facility] OTHER MEDICAL     Primary Care Physician Is Dr. Rivera Post Hyperlipidemia     Hypertension     Hyperthyroidism     Hypothyroidism, adult     Kidney stones Last Episode In 2000's    \"Passed Kidney Stones Three Different Times\"    Left hip pain     \"for months- had hip pain\"for steroid injection 7/5/2016    Migraines     Normal cardiac stress test 05/12/16    EF70% Normal    Post PTCA     2008, 2010, 2016    RECEIVED BLOOD TRANSFUSION 11-09    RECEIVED BLOOD TRANSFUSION, NO REACTION TO THE BLOOD TRANSFUSION RECEIVED    Shortness of breath on exertion     Sleep apnea     NO CPAP    Teeth missing     Upper And Lower    Ulcer ([address] 11-09    Stomach, GI Bleeding, Received Blood Transfusions    Urinary incontinence     UTI (urinary tract infection) In Past     No Current Symptoms    Wears glasses     Wears partial dentures     Upper     Past Surgical History:   Procedure Laterality Date    CARPAL TUNNEL Past     Family History   Problem Relation Age of Onset    Cancer Mother         Liver Cancer    Heart Disease Mother     High Blood Pressure Mother     Asthma Mother     Cancer Father         Colon Cancer    Heart Disease Father     High Blood Pressure Father     Arthritis Sister     Early Death Sister 52    High Blood Pressure Sister     Other Sister         \"Breathing Problem\"    Heart Disease Son         Open Heart Surgery    Diabetes Son     High Blood Pressure Son     Mental Illness Daughter         Bipolar    Early Death Sister 61        Liver Cancer    Cancer Sister         Liver Cancer     Social History   Substance Use Topics    Smoking status: Former Smoker     Packs/day: 0.25     Years: 38.00     Start date: 9/5/1962     Quit date: 9/5/2000    Smokeless tobacco: Never Used      Comment: \"never a heavy smoker just occ smoker\"    Alcohol use No      [unfilled]  Review of Systems:   · Constitutional: No Fever or Weight Loss   · Eyes: No Decreased Vision  · ENT: No Headaches, Hearing Loss or Vertigo  · Cardiovascular: + chest pain, dyspnea on exertion, palpitations or loss of consciousness  · Respiratory: No cough or wheezing    · Gastrointestinal: No abdominal pain, appetite loss, blood in stools, constipation, diarrhea or heartburn  · Genitourinary: No dysuria, trouble voiding, or hematuria  · Musculoskeletal:  No gait disturbance, weakness or joint complaints  · Integumentary: No rash or pruritis  · Neurological: No TIA or stroke symptoms  · Psychiatric: No anxiety or depression  · Endocrine: No malaise, fatigue or temperature intolerance  · Hematologic/Lymphatic: No bleeding problems, blood clots or swollen lymph nodes  · Allergic/Immunologic: No nasal congestion or hives  All systems negative except as marked.    Objective:  /70   Pulse 72   Ht 5' 2\" (1.575 m)   Wt 239 lb (108.4 kg)   BMI 43.71 kg/m²   Wt Readings from Last 3 Encounters:   07/03/18 239 lb (108.4 kg)   02/28/18 240 lb (108.9 kg)   10/17/17 240 lb (108.9 kg)     Body mass index is 43.71 kg/m². GENERAL - Alert, oriented, pleasant, in no apparent distress,normal grooming  HEENT  pupils are reactive to light and accomodation, cornea intact, conjunctive normal color, ears are normal in exam,throat exam in normal, teeth, gum and palate are normal, oral mucosa is normal without any notation of pallor or cyanosis  Neck - Supple. No jugular venous distention noted. No carotid bruits, no apical -carotid delay  Respiratory:  Normal breath sounds, No respiratory distress, No wheezing, No chest tenderness. ,no use of accessory muscles, diaphragm movement is normal  Cardiovascular: (PMI) apex non displaced,no lifts no thrills, no s3,no s4, Normal heart rate, Normal rhythm, No murmurs, No rubs, No gallops. Carotid arteries pulse and amplitude are normal no bruit, no abdominal bruit noted ( normal abdominal aorta ausculation), femoral arteries pulse and amplitude are normal no bruit, pedal pulses are normal  Femoral pulses have normal amplitude, no bruits   Extremities - No cyanosis, clubbing, or significant edema, no varicose veins    Abdomen  No masses, tenderness, or organomegaly, no hepato-splenomegally, no bruits  Musculoskeletal  No significant edema, no kyphosis or scoliosis, no deformity in any extremity noted, muscle strength and tone are normal  Skin: no ulcer,no scar,no stasis dermatitis   Neurologic  alert oriented times 3,Cranial nerves II through XII are grossly intact. There were no gross focal neurologic abnormalities.  All sensory and motor nerves examined and were normal  Psychiatric: normal mood and affect    Lab Results   Component Value Date    CKTOTAL 164 07/05/2014    CKMB 1.4 12/20/2011    TROPONINI <0.006 01/23/2014    TROPONINI <0.006 12/21/2011     BNP:    Lab Results   Component Value Date    BNP 33 01/23/2014     PT/INR:  No results found for: PTINR  Lab Results   Component Value Date    LABA1C 7.3 (H)

## 2018-07-12 ENCOUNTER — PROCEDURE VISIT (OUTPATIENT)
Dept: CARDIOLOGY CLINIC | Age: 68
End: 2018-07-12

## 2018-07-12 DIAGNOSIS — R07.9 CHEST PAIN, UNSPECIFIED TYPE: ICD-10-CM

## 2018-07-12 LAB
LV EF: 73 %
LVEF MODALITY: NORMAL

## 2018-07-12 PROCEDURE — 93015 CV STRESS TEST SUPVJ I&R: CPT | Performed by: INTERNAL MEDICINE

## 2018-07-12 PROCEDURE — 78452 HT MUSCLE IMAGE SPECT MULT: CPT | Performed by: INTERNAL MEDICINE

## 2018-07-12 PROCEDURE — A9500 TC99M SESTAMIBI: HCPCS | Performed by: INTERNAL MEDICINE

## 2018-07-13 ENCOUNTER — TELEPHONE (OUTPATIENT)
Dept: CARDIOLOGY CLINIC | Age: 68
End: 2018-07-13

## 2018-07-13 NOTE — TELEPHONE ENCOUNTER
Spoke with pt and gave nm results         Barber Johnson Covert .    Left ventricular perfusion is normal. Left ventricular function is normal    Ejection fraction is 73 %.

## 2018-08-30 ENCOUNTER — OFFICE VISIT (OUTPATIENT)
Dept: ORTHOPEDIC SURGERY | Age: 68
End: 2018-08-30

## 2018-08-30 VITALS — RESPIRATION RATE: 16 BRPM | HEART RATE: 69 BPM | OXYGEN SATURATION: 97 %

## 2018-08-30 DIAGNOSIS — M16.12 ARTHRITIS OF LEFT HIP: Primary | ICD-10-CM

## 2018-08-30 DIAGNOSIS — M65.4 DE QUERVAIN'S DISEASE (RADIAL STYLOID TENOSYNOVITIS): ICD-10-CM

## 2018-08-30 PROCEDURE — G8399 PT W/DXA RESULTS DOCUMENT: HCPCS | Performed by: PHYSICIAN ASSISTANT

## 2018-08-30 PROCEDURE — 1123F ACP DISCUSS/DSCN MKR DOCD: CPT | Performed by: PHYSICIAN ASSISTANT

## 2018-08-30 PROCEDURE — 99214 OFFICE O/P EST MOD 30 MIN: CPT | Performed by: PHYSICIAN ASSISTANT

## 2018-08-30 PROCEDURE — 4040F PNEUMOC VAC/ADMIN/RCVD: CPT | Performed by: PHYSICIAN ASSISTANT

## 2018-08-30 PROCEDURE — G8598 ASA/ANTIPLAT THER USED: HCPCS | Performed by: PHYSICIAN ASSISTANT

## 2018-08-30 PROCEDURE — 3017F COLORECTAL CA SCREEN DOC REV: CPT | Performed by: PHYSICIAN ASSISTANT

## 2018-08-30 PROCEDURE — 1036F TOBACCO NON-USER: CPT | Performed by: PHYSICIAN ASSISTANT

## 2018-08-30 PROCEDURE — 1101F PT FALLS ASSESS-DOCD LE1/YR: CPT | Performed by: PHYSICIAN ASSISTANT

## 2018-08-30 PROCEDURE — G8427 DOCREV CUR MEDS BY ELIG CLIN: HCPCS | Performed by: PHYSICIAN ASSISTANT

## 2018-08-30 PROCEDURE — 1090F PRES/ABSN URINE INCON ASSESS: CPT | Performed by: PHYSICIAN ASSISTANT

## 2018-08-30 PROCEDURE — G8417 CALC BMI ABV UP PARAM F/U: HCPCS | Performed by: PHYSICIAN ASSISTANT

## 2018-08-30 ASSESSMENT — ENCOUNTER SYMPTOMS
GASTROINTESTINAL NEGATIVE: 1
EYES NEGATIVE: 1
RESPIRATORY NEGATIVE: 1

## 2018-08-30 NOTE — PROGRESS NOTES
r-waveprogression, inferior ST-T abnormalities, consider ischemia    History of echocardiogram 01/26/2017    Ejection fraction is visually estimated at 55%. Mild concentric left ventricular hypertrophy. Mildly dilated left atrium. Mild mitral regurgitation is present.  History of nuclear stress test 01/26/2017    cardiolite-moderate ischemia mid anteroseptal,EF70%    History of nuclear stress test 07/12/2018    Normal    Hx of 24 hour EKG monitoring 12/21/2011    brief run of SVT, otherwise noth clinically sig. arrhythmia noted    Hx of cardiovascular stress test 7/25/14 1/23/12 7/14 EF70% normal study 1/23/12=thallium stress=no scintigraphic inducible myocardial ischemia, EF 70%;  03/2011=EF 70%; 03/2010=EF 70%; 12/1997; 02/1997    Hx of CT scan     05/08/2011=CT chest with contrast=examination is moderately degraded by respiratory motion. No evidence for acute pulmonary thromboembolic disease.  Dilated main pulmonary artery suggesting elevated pulm artery pressures    Hx of Doppler ultrasound     bilateral carotid 03/11/2011= no significant stenosis present    Hx of echocardiogram     03/11/2011=mildly hypertreophied left ventricle, EF 60%, mild pulm. htn, mild aortic stenosis; 02/1997   [facility] OTHER MEDICAL     Primary Care Physician Is Dr. Desmond Rasheed Hyperlipidemia     Hypertension     Hyperthyroidism     Hypothyroidism, adult     Kidney stones Last Episode In 2000's    \"Passed Kidney Stones Three Different Times\"    Left hip pain     \"for months- had hip pain\"for steroid injection 7/5/2016    Migraines     Normal cardiac stress test 05/12/16    EF70% Normal    Post PTCA     2008, 2010, 2016    RECEIVED BLOOD TRANSFUSION 11-09    RECEIVED BLOOD TRANSFUSION, NO REACTION TO THE BLOOD TRANSFUSION RECEIVED    Shortness of breath on exertion     Sleep apnea     NO CPAP    Teeth missing     Upper And Lower    Ulcer 11-09    Stomach, GI Bleeding, Received Blood Transfusions    Urinary incontinence     UTI (urinary tract infection) In Past     No Current Symptoms    Wears glasses     Wears partial dentures     Upper       Past Surgical History:   Procedure Laterality Date    CARPAL TUNNEL RELEASE Bilateral  Right     1989 Left     SECTION  ,     Tubal Ligation Also Done In  With     CHOLECYSTECTOMY      COLONOSCOPY  Last Done In     COLONOSCOPY  3/23/15    Internal hemorrhoids    CORONARY ANGIOPLASTY  2010    balloon angioplasty of distal mid LAD;     CORONARY ANGIOPLASTY  2008    LAD stent 2.75x16 taxus    CORONARY ANGIOPLASTY  2008    2. 5x8 taxus stent to the ostium of the circ.  DENTAL SURGERY      Teeth Extracted In Past    DIAGNOSTIC CARDIAC CATH LAB PROCEDURE  2010    balloon angioplasty of distal mid LAD;     DIAGNOSTIC CARDIAC CATH LAB PROCEDURE  2008    LAD stent 2.75x16 taxus    DIAGNOSTIC CARDIAC CATH LAB PROCEDURE  2008    2. 5x8 taxus stent to the ostium of the circ.  ENDOSCOPY, COLON, DIAGNOSTIC  \"Several\"    ENDOSCOPY, COLON, DIAGNOSTIC  3/23/15    small hiatal hernia, anatomy consistent with banding, gastritis    FINGER TRIGGER RELEASE  10-09 \"One On Each Hand Trigger Finger Release\"    - \"Left Middle Finger Trigger Finger Release\"    HYSTERECTOMY  1982    Partial Abdominal Hysterectomy, \"Pinned The Bladder Up\"    JOINT REPLACEMENT Right -    Total Right Hip    JOINT REPLACEMENT Right     Total Right Knee    JOINT REPLACEMENT Left     Total Left Knee    JOINT REPLACEMENT Left     Revision Total Left Knee    KNEE SURGERY Right ,     KNEE SURGERY Left     Left Knee Bone Graft    KNEE SURGERY Right     Right Knee Bone Graft    OTHER SURGICAL HISTORY      \"Stomach Stapling\" Pre Surgery Weight Was 230 lbs    OTHER SURGICAL HISTORY Left 04/10/2017    left hip steroid injection    PTCA  16    Stenting of the LAD.     SHOULDER ARTHROSCOPY Right 2-10    SHOULDER ARTHROSCOPY Left 09/10/14    TONSILLECTOMY AND ADENOIDECTOMY  1964    TUBAL LIGATION      Done With     WISDOM TOOTH EXTRACTION      All [place] Teeth Extracted In Past       Family History   Problem Relation Age of Onset    Cancer Mother         Liver Cancer    Heart Disease Mother     High Blood Pressure Mother     Asthma Mother     Cancer Father         Colon Cancer    Heart Disease Father     High Blood Pressure Father     Arthritis Sister     Early Death Sister 52    High Blood Pressure Sister     Other Sister         \"Breathing Problem\"    Heart Disease Son         Open Heart Surgery    Diabetes Son     High Blood Pressure Son     Mental Illness Daughter         Bipolar    Early Death Sister 61        Liver Cancer    Cancer Sister         Liver Cancer       Social History     Social History    Marital status:      Spouse name: N/A    Number of children: N/A    Years of education: N/A     Social History Main Topics    Smoking status: Former Smoker     Packs/day: 0.25     Years: 38.00     Start date: 1962     Quit date: 2000    Smokeless tobacco: Never Used      Comment: \"never a heavy smoker just occ smoker\"    Alcohol use No    Drug use: No      Comment: \"When I Quit I Was Smoking About 2 Two Packs Of Cigarettes A Week\"    Sexual activity: Yes     Partners: Male     Other Topics Concern    Not on file     Social History Narrative    No narrative on file       Current Outpatient Prescriptions   Medication Sig Dispense Refill    ferrous sulfate 325 (65 Fe) MG tablet Take 325 mg by mouth daily (with breakfast)      quinapril (ACCUPRIL) 40 MG tablet Take 1 tablet by mouth nightly 90 tablet 3    carvedilol (COREG) 12.5 MG tablet Take 1 tablet by mouth 2 times daily (with meals) 1180 tablet 3    atorvastatin (LIPITOR) 20 MG tablet Take 1 tablet by mouth nightly 90 tablet 3    hydrochlorothiazide (HYDRODIURIL) 25 MG tablet Take 1 tablet by mouth daily Takes 12.5 daily 1/2 of a 25 mg tablet 90 tablet 3    metFORMIN (GLUCOPHAGE) 500 MG tablet TK 2 T PO QAM AND 3 T PO QPM WITH MEALS  11    meclizine (ANTIVERT) 25 MG tablet TK 1 T PO  Q 4 H PRF  DIZZINESS  3    B Complex Vitamins (VITAMIN B COMPLEX PO) Take by mouth      vitamin D (CHOLECALCIFEROL) 1000 UNIT TABS tablet Take 1,000 Units by mouth daily      ondansetron (ZOFRAN) 4 MG tablet Take 1 tablet by mouth every 8 hours as needed for Nausea or Vomiting 30 tablet 3    ranitidine (ZANTAC) 300 MG tablet TAKE 1 TABLET BY MOUTH EVERY NIGHT 30 tablet 5    HYDROcodone-acetaminophen (NORCO) 5-325 MG per tablet Take 1 tablet by mouth every 6 hours as needed   0    albuterol (PROVENTIL HFA;VENTOLIN HFA) 108 (90 BASE) MCG/ACT inhaler Inhale 2 puffs into the lungs every 6 hours as needed for Wheezing      aspirin 81 MG tablet Take 81 mg by mouth every morning. Last Dose Taken 9-2-14 Due To Scheduled Surgery      glipiZIDE (GLUCOTROL) 5 MG tablet Take 5 mg by mouth 2 times daily (before meals).  levothyroxine (SYNTHROID) 150 MCG tablet Take 150 mcg by mouth nightly.  sitaGLIPtan (JANUVIA) 100 MG tablet Take 100 mg by mouth every morning. No current facility-administered medications for this visit. Allergies   Allergen Reactions    Codeine Nausea Only       Review of Systems:  See above    Physical Exam:   There were no vitals taken for this visit. Gait is Antalgic. The patient can bear weight on the injured extremity. Gen/Psych: Examination reveals a pleasant individual in no acute distress. The patient is oriented to time, place and person. The patient's mood and affect are appropriate.     Lymph: The lymphatic examination bilaterally reveals all areas to be without enlargement or induration.      Skin intact without lymphadenopathy, discoloration, or abnormal temperature.      Vascular:  There is intact, symmetric circulation in both lower

## 2019-01-23 ENCOUNTER — HOSPITAL ENCOUNTER (OUTPATIENT)
Age: 69
Discharge: HOME | End: 2019-01-23
Payer: MEDICARE

## 2019-01-23 ENCOUNTER — HOSPITAL ENCOUNTER (OUTPATIENT)
Dept: GENERAL RADIOLOGY | Age: 69
Discharge: HOME | End: 2019-01-23
Payer: MEDICARE

## 2019-01-23 DIAGNOSIS — R06.02 BREATH SHORTNESS: ICD-10-CM

## 2019-01-23 PROCEDURE — 71046 X-RAY EXAM CHEST 2 VIEWS: CPT

## 2019-01-28 ENCOUNTER — INITIAL CONSULT (OUTPATIENT)
Dept: PULMONOLOGY | Age: 69
End: 2019-01-28
Payer: MEDICARE

## 2019-01-28 VITALS
SYSTOLIC BLOOD PRESSURE: 132 MMHG | BODY MASS INDEX: 41.46 KG/M2 | HEART RATE: 60 BPM | HEIGHT: 63 IN | DIASTOLIC BLOOD PRESSURE: 73 MMHG | OXYGEN SATURATION: 93 % | WEIGHT: 234 LBS

## 2019-01-28 DIAGNOSIS — G47.33 OBSTRUCTIVE SLEEP APNEA: ICD-10-CM

## 2019-01-28 DIAGNOSIS — E66.01 MORBID OBESITY WITH BMI OF 40.0-44.9, ADULT (HCC): ICD-10-CM

## 2019-01-28 DIAGNOSIS — R06.02 SOB (SHORTNESS OF BREATH) ON EXERTION: Primary | ICD-10-CM

## 2019-01-28 DIAGNOSIS — R05.8 DRY COUGH: ICD-10-CM

## 2019-01-28 PROCEDURE — 99203 OFFICE O/P NEW LOW 30 MIN: CPT | Performed by: NURSE PRACTITIONER

## 2019-01-28 ASSESSMENT — SLEEP AND FATIGUE QUESTIONNAIRES
HOW LIKELY ARE YOU TO NOD OFF OR FALL ASLEEP IN A CAR, WHILE STOPPED FOR A FEW MINUTES IN TRAFFIC: 0
HOW LIKELY ARE YOU TO NOD OFF OR FALL ASLEEP WHILE LYING DOWN TO REST IN THE AFTERNOON WHEN CIRCUMSTANCES PERMIT: 3
HOW LIKELY ARE YOU TO NOD OFF OR FALL ASLEEP WHILE SITTING AND TALKING TO SOMEONE: 3
ESS TOTAL SCORE: 21
HOW LIKELY ARE YOU TO NOD OFF OR FALL ASLEEP WHILE WATCHING TV: 3
HOW LIKELY ARE YOU TO NOD OFF OR FALL ASLEEP WHEN YOU ARE A PASSENGER IN A CAR FOR AN HOUR WITHOUT A BREAK: 3
HOW LIKELY ARE YOU TO NOD OFF OR FALL ASLEEP WHILE SITTING INACTIVE IN A PUBLIC PLACE: 3
NECK CIRCUMFERENCE (INCHES): 18.5
HOW LIKELY ARE YOU TO NOD OFF OR FALL ASLEEP WHILE SITTING QUIETLY AFTER LUNCH WITHOUT ALCOHOL: 3
HOW LIKELY ARE YOU TO NOD OFF OR FALL ASLEEP WHILE SITTING AND READING: 3

## 2019-02-01 ENCOUNTER — OFFICE VISIT (OUTPATIENT)
Dept: CARDIOLOGY CLINIC | Age: 69
End: 2019-02-01
Payer: MEDICARE

## 2019-02-01 ENCOUNTER — TELEPHONE (OUTPATIENT)
Dept: CARDIOLOGY CLINIC | Age: 69
End: 2019-02-01

## 2019-02-01 VITALS
SYSTOLIC BLOOD PRESSURE: 138 MMHG | WEIGHT: 237 LBS | HEART RATE: 72 BPM | BODY MASS INDEX: 41.99 KG/M2 | DIASTOLIC BLOOD PRESSURE: 82 MMHG | HEIGHT: 63 IN

## 2019-02-01 DIAGNOSIS — M79.89 LEG SWELLING: ICD-10-CM

## 2019-02-01 DIAGNOSIS — R06.02 SHORTNESS OF BREATH: Primary | ICD-10-CM

## 2019-02-01 PROCEDURE — G8427 DOCREV CUR MEDS BY ELIG CLIN: HCPCS | Performed by: INTERNAL MEDICINE

## 2019-02-01 PROCEDURE — 1090F PRES/ABSN URINE INCON ASSESS: CPT | Performed by: INTERNAL MEDICINE

## 2019-02-01 PROCEDURE — G8417 CALC BMI ABV UP PARAM F/U: HCPCS | Performed by: INTERNAL MEDICINE

## 2019-02-01 PROCEDURE — 93000 ELECTROCARDIOGRAM COMPLETE: CPT | Performed by: INTERNAL MEDICINE

## 2019-02-01 PROCEDURE — 4040F PNEUMOC VAC/ADMIN/RCVD: CPT | Performed by: INTERNAL MEDICINE

## 2019-02-01 PROCEDURE — 1036F TOBACCO NON-USER: CPT | Performed by: INTERNAL MEDICINE

## 2019-02-01 PROCEDURE — 1101F PT FALLS ASSESS-DOCD LE1/YR: CPT | Performed by: INTERNAL MEDICINE

## 2019-02-01 PROCEDURE — 1123F ACP DISCUSS/DSCN MKR DOCD: CPT | Performed by: INTERNAL MEDICINE

## 2019-02-01 PROCEDURE — G8598 ASA/ANTIPLAT THER USED: HCPCS | Performed by: INTERNAL MEDICINE

## 2019-02-01 PROCEDURE — G8484 FLU IMMUNIZE NO ADMIN: HCPCS | Performed by: INTERNAL MEDICINE

## 2019-02-01 PROCEDURE — G8399 PT W/DXA RESULTS DOCUMENT: HCPCS | Performed by: INTERNAL MEDICINE

## 2019-02-01 PROCEDURE — 3017F COLORECTAL CA SCREEN DOC REV: CPT | Performed by: INTERNAL MEDICINE

## 2019-02-01 PROCEDURE — 99214 OFFICE O/P EST MOD 30 MIN: CPT | Performed by: INTERNAL MEDICINE

## 2019-02-08 ENCOUNTER — HOSPITAL ENCOUNTER (OUTPATIENT)
Dept: CT IMAGING | Age: 69
Discharge: HOME | End: 2019-02-08
Payer: MEDICARE

## 2019-02-08 DIAGNOSIS — R05.8 DRY COUGH: ICD-10-CM

## 2019-02-08 DIAGNOSIS — R06.02 SOB (SHORTNESS OF BREATH) ON EXERTION: ICD-10-CM

## 2019-02-08 PROCEDURE — 71250 CT THORAX DX C-: CPT

## 2019-02-11 ENCOUNTER — PROCEDURE VISIT (OUTPATIENT)
Dept: CARDIOLOGY CLINIC | Age: 69
End: 2019-02-11
Payer: MEDICARE

## 2019-02-11 DIAGNOSIS — M79.89 LEG SWELLING: Primary | ICD-10-CM

## 2019-02-11 PROCEDURE — 93970 EXTREMITY STUDY: CPT | Performed by: INTERNAL MEDICINE

## 2019-02-12 ENCOUNTER — TELEPHONE (OUTPATIENT)
Dept: CARDIOLOGY CLINIC | Age: 69
End: 2019-02-12

## 2019-03-10 ENCOUNTER — HOSPITAL ENCOUNTER (OUTPATIENT)
Dept: SLEEP CENTER | Age: 69
Discharge: HOME | End: 2019-03-10
Payer: MEDICARE

## 2019-03-10 VITALS — WEIGHT: 234 LBS | HEIGHT: 63 IN | BODY MASS INDEX: 41.46 KG/M2

## 2019-03-10 DIAGNOSIS — G47.33 OBSTRUCTIVE SLEEP APNEA: ICD-10-CM

## 2019-03-10 PROCEDURE — 95810 POLYSOM 6/> YRS 4/> PARAM: CPT | Performed by: INTERNAL MEDICINE

## 2019-03-10 PROCEDURE — 95810 POLYSOM 6/> YRS 4/> PARAM: CPT

## 2019-03-10 ASSESSMENT — SLEEP AND FATIGUE QUESTIONNAIRES
HOW LIKELY ARE YOU TO NOD OFF OR FALL ASLEEP WHILE SITTING INACTIVE IN A PUBLIC PLACE: 0
HOW LIKELY ARE YOU TO NOD OFF OR FALL ASLEEP WHEN YOU ARE A PASSENGER IN A CAR FOR AN HOUR WITHOUT A BREAK: 0
HOW LIKELY ARE YOU TO NOD OFF OR FALL ASLEEP IN A CAR, WHILE STOPPED FOR A FEW MINUTES IN TRAFFIC: 0
HOW LIKELY ARE YOU TO NOD OFF OR FALL ASLEEP WHILE SITTING QUIETLY AFTER LUNCH WITHOUT ALCOHOL: 0
HOW LIKELY ARE YOU TO NOD OFF OR FALL ASLEEP WHILE SITTING AND READING: 1
HOW LIKELY ARE YOU TO NOD OFF OR FALL ASLEEP WHILE WATCHING TV: 1
HOW LIKELY ARE YOU TO NOD OFF OR FALL ASLEEP WHILE SITTING AND TALKING TO SOMEONE: 0
HOW LIKELY ARE YOU TO NOD OFF OR FALL ASLEEP WHILE LYING DOWN TO REST IN THE AFTERNOON WHEN CIRCUMSTANCES PERMIT: 2
ESS TOTAL SCORE: 4

## 2019-03-29 ENCOUNTER — HOSPITAL ENCOUNTER (OUTPATIENT)
Dept: PULMONOLOGY | Age: 69
Discharge: HOME | End: 2019-03-29
Payer: MEDICARE

## 2019-03-29 DIAGNOSIS — R06.02 SOB (SHORTNESS OF BREATH) ON EXERTION: ICD-10-CM

## 2019-03-29 LAB
DLCO %PRED: 90 %
DLCO PRED: NORMAL ML/MIN/MMHG
DLCO/VA %PRED: NORMAL %
DLCO/VA PRED: NORMAL ML/MIN/MMHG
DLCO/VA: NORMAL ML/MIN/MMHG
DLCO: NORMAL ML/MIN/MMHG
EXPIRATORY TIME-POST: NORMAL SEC
EXPIRATORY TIME: NORMAL SEC
FEF 25-75% %CHNG: NORMAL
FEF 25-75% %PRED-POST: NORMAL %
FEF 25-75% %PRED-PRE: NORMAL L/SEC
FEF 25-75% PRED: NORMAL L/SEC
FEF 25-75%-POST: NORMAL L/SEC
FEF 25-75%-PRE: NORMAL L/SEC
FEV1 %PRED-POST: 88 %
FEV1 %PRED-PRE: 82 %
FEV1 PRED: NORMAL L
FEV1-POST: NORMAL L
FEV1-PRE: NORMAL L
FEV1/FVC %PRED-POST: NORMAL %
FEV1/FVC %PRED-PRE: NORMAL %
FEV1/FVC PRED: NORMAL %
FEV1/FVC-POST: 115 %
FEV1/FVC-PRE: 109 %
FVC %PRED-POST: NORMAL L
FVC %PRED-PRE: NORMAL %
FVC PRED: NORMAL L
FVC-POST: NORMAL L
FVC-PRE: NORMAL L
GAW %PRED: NORMAL %
GAW PRED: NORMAL L/S/CMH2O
GAW: NORMAL L/S/CMH2O
IC %PRED: NORMAL %
IC PRED: NORMAL L
IC: NORMAL L
MEP: NORMAL
MIP: NORMAL
MVV %PRED-PRE: NORMAL %
MVV PRED: NORMAL L/MIN
MVV-PRE: NORMAL L/MIN
PEF %PRED-POST: NORMAL %
PEF %PRED-PRE: NORMAL L/SEC
PEF PRED: NORMAL L/SEC
PEF%CHNG: NORMAL
PEF-POST: NORMAL L/SEC
PEF-PRE: NORMAL L/SEC
RAW %PRED: NORMAL %
RAW PRED: NORMAL CMH2O/L/S
RAW: NORMAL CMH2O/L/S
RV %PRED: NORMAL %
RV PRED: NORMAL L
RV: NORMAL L
SVC %PRED: NORMAL %
SVC PRED: NORMAL L
SVC: NORMAL L
TLC %PRED: NORMAL %
TLC PRED: NORMAL L
TLC: NORMAL L
VA %PRED: NORMAL %
VA PRED: NORMAL L
VA: NORMAL L
VTG %PRED: NORMAL %
VTG PRED: NORMAL L
VTG: NORMAL L

## 2019-03-29 PROCEDURE — 94060 EVALUATION OF WHEEZING: CPT

## 2019-03-29 PROCEDURE — 94726 PLETHYSMOGRAPHY LUNG VOLUMES: CPT

## 2019-03-29 PROCEDURE — 94729 DIFFUSING CAPACITY: CPT

## 2019-03-29 ASSESSMENT — PULMONARY FUNCTION TESTS
FEV1_PERCENT_PREDICTED_PRE: 82
FEV1/FVC_PRE: 109
FEV1_PERCENT_PREDICTED_POST: 88
FEV1/FVC_POST: 115

## 2019-04-01 ENCOUNTER — TELEPHONE (OUTPATIENT)
Dept: GASTROENTEROLOGY | Age: 69
End: 2019-04-01

## 2019-04-01 NOTE — TELEPHONE ENCOUNTER
Pt called because she is a former patient of Dr. Everrett Kehr. Pt states her stool looks like coffee grounds and is very loose. She is having fecal incontinence with this as well.  Pt scheduled an appt with Dr. Paige Chen tomorrow at 8:30 AM.

## 2019-04-02 ENCOUNTER — OFFICE VISIT (OUTPATIENT)
Dept: GASTROENTEROLOGY | Age: 69
End: 2019-04-02
Payer: MEDICARE

## 2019-04-02 VITALS
DIASTOLIC BLOOD PRESSURE: 78 MMHG | BODY MASS INDEX: 42.17 KG/M2 | HEART RATE: 67 BPM | SYSTOLIC BLOOD PRESSURE: 132 MMHG | WEIGHT: 238 LBS | OXYGEN SATURATION: 97 % | HEIGHT: 63 IN

## 2019-04-02 DIAGNOSIS — R19.7 DIARRHEA OF PRESUMED INFECTIOUS ORIGIN: Primary | ICD-10-CM

## 2019-04-02 PROCEDURE — 1090F PRES/ABSN URINE INCON ASSESS: CPT | Performed by: INTERNAL MEDICINE

## 2019-04-02 PROCEDURE — G8417 CALC BMI ABV UP PARAM F/U: HCPCS | Performed by: INTERNAL MEDICINE

## 2019-04-02 PROCEDURE — 3017F COLORECTAL CA SCREEN DOC REV: CPT | Performed by: INTERNAL MEDICINE

## 2019-04-02 PROCEDURE — 1036F TOBACCO NON-USER: CPT | Performed by: INTERNAL MEDICINE

## 2019-04-02 PROCEDURE — G8399 PT W/DXA RESULTS DOCUMENT: HCPCS | Performed by: INTERNAL MEDICINE

## 2019-04-02 PROCEDURE — 4040F PNEUMOC VAC/ADMIN/RCVD: CPT | Performed by: INTERNAL MEDICINE

## 2019-04-02 PROCEDURE — 99203 OFFICE O/P NEW LOW 30 MIN: CPT | Performed by: INTERNAL MEDICINE

## 2019-04-02 PROCEDURE — G8598 ASA/ANTIPLAT THER USED: HCPCS | Performed by: INTERNAL MEDICINE

## 2019-04-02 PROCEDURE — G8427 DOCREV CUR MEDS BY ELIG CLIN: HCPCS | Performed by: INTERNAL MEDICINE

## 2019-04-02 PROCEDURE — 1123F ACP DISCUSS/DSCN MKR DOCD: CPT | Performed by: INTERNAL MEDICINE

## 2019-04-02 NOTE — LETTER
Marylu Guo [phone]    Your procedure with Dr. Nicole Emmanuel has been scheduled at the     [facility] located at     [address] [place] [address] [place] [address].                      4/10/19                                           /      7:30_______________            Procedure Date                Arrival Time (Arrive 1 hour early)       8:30_____________  Procedure Time                               If an emergency arises and you are unable to keep your procedure appointment, you must call [facility] at [phone] and our office at [phone] within 24 hours to let us know. Not giving 24 hour notice or not showing for your procedure appointment may result in immediate dismissal from Dr. Kelley Jason practice. COLONOSCOPY  MIRALAX AND DULCOLAX SPLIT BOWEL PREP    To prepare for this procedure, you will need to clean out your bowels or large intestines. Review all the information you are given as soon as you can so that you are prepared and know what to expect during and after your procedure. You may need to make changes to your diet or medications. Begin this bowel prep 1 day prior to your scheduled procedure. You will need an adult to drive you home after the procedure. Your  will need to check in with you at the facility so the staff are aware of who they are and needs to stay at the facility during the entire procedure. If you  does leave during the procedure, he or she needs to give the staff a phone number where they can be reached and stay within 30 minutes of the facility. If you are taking a cab, bus, or medical transportation service home after the procedure, an adult, other than the , needs to ride with you for your safety.  You should have an adult with you to help you and check on you after the procedure at home for at least 6 hours after the procedure. If you do not have a  with you, your procedure will be rescheduled to another date. You may need to make changes to your medicines. If you take aspirin or NSAIDS, such as ibuprofen, naproxen, or Celebrex for pain, you do NOT need to stop taking these medicines before the procedure. If you take medicines for diabetes, ask the doctor who ordered your diabetes medication how to adjust these medicines for this procedure. If you take any of the blood thinner medications listed below:  ? Ask the doctor who ordered this medication if it is safe for you to stop taking this medicine before the procedure. If you have a stent or certain other health problems, do NOT stop taking these medicines unless otherwise directed by the doctor. ? If your doctor agrees you should stop taking any of the medications listed below, stop for the listed number of days or as your doctor recommends:    o Garon Pac (Ticagretor)  5 days     o Coumadin (Warfarin)  5 days  o Effient (Prasugrel)  7 days  o Eliquis (Apixaban)  2 days  o Lovenox (Enoxaparin)  1 day  o Plavix (Clopidogrel)  5 days  o Pletal (Cilostazol)  5 days  o Pradaxa (Dabigatran)  2 days  o Savaysa (Edoxaban)  2 days  o Xarelto (Rivaroxaban)  1 day                You will need to purchase 2 medicines over the counter from a pharmacy. They can be found in the laxative section. Store brands often cost less. You do not need a prescription. Ask the pharmacist to help you find what you need if you are having trouble finding it.  o A large bottle (8.3 ounces or 238 grams) of Miralax (Polyethelene Glycol 3350)  o 4 Dulcolax Laxative Tablets (Bisacodyl USP 5 mg)  o Also buy one large 64-ounce bottle of Gatorade, Powerade, or other sports drink that is not red or purple in color. Use Crystal Light or sugar-free sports drinks if you have diabetes. The liquid will be used to mix your Miralax the day before the procedure. o You will also need plenty of clear liquids from the list to drink during the day before your procedure    Acceptable clear liquids for your prep    ? AVOID RED OR PURPLE COLORED PRODUCTS  ? Water  ? Fruit juices that you can see through, such as apple, white cranberry, or white grape  ? Popsicles or ice chips  ? Ginger ale or lemon-lime soda  ? Gatorade, other sports drinks or drink mixes like Rory-Aid  ? Clear broth or bouillon  ? Jell-0  ? Coffee or tea with no milk or cream added    About the prep    For this prep, you will drink a medicine mixture and take some pills to clear your bowels of all solid matter. You will need to go to the bathroom often, and your stool will get very watery. The prep may cause you to have cramps or feel bloated. Your bowels are clear when you pass pale yellow liquid without any stool. The prep medicine may not taste good. You need to take all of it, so your bowels are clear for the procedure. If your bowels are not cleared, you may have to have the procedure rescheduled and do another prep. Day before your procedure    Do not eat any solid foods or eat or drink any milk products until after your procedure is done. Drink only clear liquids. Morning  ? Start in the morning drinking only clear liquids  ? Drink at least four 8-ounce glasses of water through the day as well as other clear liquids    At 3:00 PM  ? Take 4 Dulcolax tablets with a drink of clear liquid  ? Mix the Miralax powder with the sports drink, so the mixture can chill in the refrigerator  o To make room for the Miralax in the sports drink bottle, pour out a cup of the sports drink first and drink it  o Pour the Miralax powder into the sports drink bottle. Put the cap on the bottle and shake it to dissolve the powder  o Place the mixture into the refrigerator to cool.  Most people find it easier to drink if the mixture is cold    At 6:00 PM through evening ? Start to drink the prep medicine mixture. Drink one, 8-ounce cup of the mixture every 10 to 15 minutes until you finish half the mixture. It is better to drink each cup quickly rather than taking small sips  ? Drink half the mixture this evening. Place the other half of the mixture in the refrigerator. You will need to drink the rest of the mixture in the morning. ? Continue to drink other clear liquids through the evening  Morning of the procedure    ? Six hours before your procedure, drink the rest of the Miralax mixture as before. You may need to set your alarm to get up to finish the prep medication if you have an early appointment time  ? Drink two 8-ounce cups of clear liquids after you finish the prep medication  ? You can drink clear liquids up to 4 hours prior to the procedure  ? Take your medicines for blood pressure, heart issues, seizures or pain with a sip of water up to 2 hours before the procedure      If you are vomiting up your prep medicine, have not had a bowel movement, or your bowels are not clear, please call our office at [phone]. If you call after normal business hours, please leave us a detailed voicemail so we can call you as soon as we get back into the office.

## 2019-04-02 NOTE — PROGRESS NOTES
Reason for Visit: Loose stool for 1 week    HPI: A 77year old obese  female patient who has a past medical history of acid reflux, esophagitis, hyperthyroidism, hypertension, hyperlipidemia, history of GI bleeding, sleep apnea, gastritis had come to my office to follow up her one week history of loose stool. The patient was in her usual health status until one week ago when she presented with loose stools after she came back from [place] and that had recently taken antibiotics probably amoxicillin for sinus infection. Initially she moved her bowels 7-8 times per day. Stools are loose but not watery. Over past 2 days she had slightly improved. Her stool was more performed and soft and the frequency of stooling was decreased to 3 or 4 times per day. The stool was not black and there was no hematochezia. She described the stool was dark brown. She denies associated abdominal pain. She denied nausea, vomiting, heartburn, regurgitation, dysphagia, odynophagia, abdominal bloating, abdominal gassy sensations, abdominal girth increase, fever, chills, weight gain, or weight loss. Her last colonoscopy in 2015 not issue microscopic colitis or colon polyps. Her last Upper endoscopy showed gastritis with no H. pylori infection in 2015. She has been taking Zantac only. She denied taking NSAID medications       PMH:    Past Medical History:   Diagnosis Date    Acid reflux     Angina at rest [facility]) In Past    Denies Chest Pain At This Time    Arthritis     \"Knees, Back And Hips\"    Asthma     Broken teeth     Upper    CAD (coronary artery disease)     Sees Dr. Niko Moyer a couple of heart stents- had stress test in May 2018 all ok\"    Chronic back pain     Degenerative joint disease     Back     Diabetes mellitus ([facility] [address] Dx 2003    GI bleed 11-09    H/O Doppler venous ultrasound 02/11/2019    No significant insufficiency noted.     H/O echocardiogram 07/14/2014    EF 50%, low normal systolic function, no wall motion abnormalities, mild concentric hypertrophy and signs of diastolic dysfunction, mildly dilated left atrium,  no pericardial effusion, mild mitral and tricuspid regurg.  Hiatal hernia     History of complete ECG     07/20/2012=NSR, leftward axis, prob normal for age, poor r-waveprogression, inferior ST-T abnormalities, consider ischemia    History of echocardiogram 01/26/2017    Ejection fraction is visually estimated at 55%. Mild concentric left ventricular hypertrophy. Mildly dilated left atrium. Mild mitral regurgitation is present.  History of nuclear stress test 01/26/2017    cardiolite-moderate ischemia mid anteroseptal,EF70%    History of nuclear stress test 07/12/2018    Normal    Hx of 24 hour EKG monitoring 12/21/2011    brief run of SVT, otherwise noth clinically sig. arrhythmia noted    Hx of cardiovascular stress test 7/25/14 1/23/12 7/14 EF70% normal study 1/23/12=thallium stress=no scintigraphic inducible myocardial ischemia, EF 70%;  03/2011=EF 70%; 03/2010=EF 70%; 12/1997; 02/1997    Hx of CT scan     05/08/2011=CT chest with contrast=examination is moderately degraded by respiratory motion. No evidence for acute pulmonary thromboembolic disease.  Dilated main pulmonary artery suggesting elevated pulm artery pressures    Hx of Doppler ultrasound     bilateral carotid 03/11/2011= no significant stenosis present    Hx of echocardiogram     03/11/2011=mildly hypertreophied left ventricle, EF 60%, mild pulm. htn, mild aortic stenosis; 02/1997   [facility] OTHER MEDICAL     Primary Care Physician Is Dr. Lianne Vasquez Hyperlipidemia     Hypertension     Hyperthyroidism     Hypothyroidism, adult     Kidney stones Last Episode In 2000's    \"Passed Kidney Stones Three Different Times\"    Left hip pain     \"for months- had hip pain\"for steroid injection 7/5/2016    Migraines     Normal cardiac stress test 05/12/16    EF70% Normal    ORLIN (obstructive sleep apnea)     Post PTCA     , ,     RECEIVED BLOOD TRANSFUSION     RECEIVED BLOOD TRANSFUSION, NO REACTION TO THE BLOOD TRANSFUSION RECEIVED    Shortness of breath on exertion     Sleep apnea     NO CPAP- last sleep study done     Teeth missing     Upper And Lower    Ulcer     Stomach, GI Bleeding, Received Blood Transfusions    Urinary incontinence     \"wear a pad all the time\"    UTI (urinary tract infection) In Past     No Current Symptoms    Venous US Dup     Wears glasses     Wears partial dentures     Upper       PSH:    Past Surgical History:   Procedure Laterality Date    CARPAL TUNNEL RELEASE Bilateral  Right      Left     SECTION  1975    Tubal Ligation Also Done In  With     CHOLECYSTECTOMY      COLONOSCOPY  Last Done In     COLONOSCOPY  3/23/15    Internal hemorrhoids    CORONARY ANGIOPLASTY  2010    balloon angioplasty of distal mid LAD;     CORONARY ANGIOPLASTY  2008    LAD stent 2.75x16 taxus    CORONARY ANGIOPLASTY  2008    2. 5x8 taxus stent to the ostium of the circ.  DENTAL SURGERY      Teeth Extracted In Past    DIAGNOSTIC CARDIAC CATH LAB PROCEDURE  2010    balloon angioplasty of distal mid LAD;     DIAGNOSTIC CARDIAC CATH LAB PROCEDURE  2008    LAD stent 2.75x16 taxus    DIAGNOSTIC CARDIAC CATH LAB PROCEDURE  2008    2. 5x8 taxus stent to the ostium of the circ.     ENDOSCOPY, COLON, DIAGNOSTIC  \"Several\"    ENDOSCOPY, COLON, DIAGNOSTIC  3/23/15    small hiatal hernia, anatomy consistent with banding, gastritis    FINGER TRIGGER RELEASE  10-09 \"One On Each Hand Trigger Finger Release\"     \"Left Middle Finger Trigger Finger Release\"    HYSTERECTOMY      Partial Abdominal Hysterectomy, \"Pinned The Bladder Up\"    JOINT REPLACEMENT Right     Total Right Hip    JOINT REPLACEMENT Right     Total Right Knee    JOINT REPLACEMENT Left     Total Left Knee    JOINT REPLACEMENT Left     Revision Total Left Knee    KNEE SURGERY Right ,     KNEE SURGERY Left     Left Knee Bone Graft    KNEE SURGERY Right     Right Knee Bone Graft    OTHER SURGICAL HISTORY      \"Stomach Stapling\" Pre Surgery Weight Was 230 lbs    OTHER SURGICAL HISTORY Left 04/10/2017    left hip steroid injection    PTCA  16    Stenting of the LAD.     SHOULDER ARTHROSCOPY Right 2-10    SHOULDER ARTHROSCOPY Left 09/10/14    TONSILLECTOMY AND ADENOIDECTOMY  1964    TUBAL LIGATION      Done With     WISDOM TOOTH EXTRACTION      All Four [place]ula Teeth Extracted In Past       Medications:    Current Outpatient Medications on File Prior to Visit   Medication Sig Dispense Refill    OXYGEN Inhale into the lungs      quinapril (ACCUPRIL) 40 MG tablet Take 1 tablet by mouth nightly 90 tablet 3    carvedilol (COREG) 12.5 MG tablet Take 1 tablet by mouth 2 times daily (with meals) 1180 tablet 3    atorvastatin (LIPITOR) 20 MG tablet Take 1 tablet by mouth nightly 90 tablet 3    hydrochlorothiazide (HYDRODIURIL) 25 MG tablet Take 1 tablet by mouth daily Takes 12.5 daily 1/2 of a 25 mg tablet 90 tablet 3    metFORMIN (GLUCOPHAGE) 500 MG tablet TK 2 T PO QAM AND 3 T PO QPM WITH MEALS  11    meclizine (ANTIVERT) 25 MG tablet TK 1 T PO  Q 4 H PRF  DIZZINESS  3    B Complex Vitamins (VITAMIN B COMPLEX PO) Take by mouth daily       vitamin D (CHOLECALCIFEROL) 1000 UNIT TABS tablet Take 1,000 Units by mouth daily      ondansetron (ZOFRAN) 4 MG tablet Take 1 tablet by mouth every 8 hours as needed for Nausea or Vomiting 30 tablet 3    ranitidine (ZANTAC) 300 MG tablet TAKE 1 TABLET BY MOUTH EVERY NIGHT 30 tablet 5    HYDROcodone-acetaminophen (NORCO) 5-325 MG per tablet Take 1 tablet by mouth every 6 hours as needed   0    albuterol (PROVENTIL HFA;VENTOLIN HFA) 108 (90 BASE) MCG/ACT inhaler Inhale 2 puffs into the lungs every 6 hours as needed for Wheezing      aspirin 81 MG tablet Take 81 mg by mouth every morning. Last Dose Taken 14 Due To Scheduled Surgery      glipiZIDE (GLUCOTROL) 5 MG tablet Take 5 mg by mouth 2 times daily (before meals).  levothyroxine (SYNTHROID) 150 MCG tablet Take 150 mcg by mouth nightly.  sitaGLIPtan (JANUVIA) 100 MG tablet Take 100 mg by mouth every morning. No current facility-administered medications on file prior to visit. Allergies:    Allergies   Allergen Reactions    Codeine Nausea Only       Social History:    Social History     Socioeconomic History    Marital status:      Spouse name: Not on file    Number of children: Not on file    Years of education: Not on file    Highest education level: Not on file   Occupational History    Not on file   Social Needs    Financial resource strain: Not on file    Food insecurity:     Worry: Not on file     Inability: Not on file    Transportation needs:     Medical: Not on file     Non-medical: Not on file   Tobacco Use    Smoking status: Former Smoker     Packs/day: 0.25     Years: 38.00     Pack years: 9.50     Types: Cigarettes     Start date: 1962     Last attempt to quit: 2000     Years since quittin.5    Smokeless tobacco: Never Used    Tobacco comment: \"never a heavy smoker just occ smoker\"   Substance and Sexual Activity    Alcohol use: No    Drug use: No     Comment: \"When I Quit I Was Smoking About 2 Two Packs Of Cigarettes A Week\"    Sexual activity: Yes     Partners: Male   Lifestyle    Physical activity:     Days per week: Not on file     Minutes per session: Not on file    Stress: Not on file   Relationships    Social connections:     Talks on phone: Not on file     Gets together: Not on file     Attends Religion service: Not on file     Active member of club or organization: Not on file     Attends meetings of clubs or organizations: Not on file     Relationship status: Not on file    Intimate partner violence: Fear of current or ex partner: Not on file     Emotionally abused: Not on file     Physically abused: Not on file     Forced sexual activity: Not on file   Other Topics Concern    Not on file   Social History Narrative    Not on file       Family History:        Problem Relation Age of Onset    Cancer Mother         Liver Cancer    Heart Disease Mother     High Blood Pressure Mother     Asthma Mother     Cancer Father         Colon Cancer    Heart Disease Father     High Blood Pressure Father     Colon Cancer Father     Arthritis Sister     Early Death Sister 52    High Blood Pressure Sister     Other Sister         \"Breathing Problem\"    Heart Disease Son         Open Heart Surgery    Diabetes Son     High Blood Pressure Son     Mental Illness Daughter         Bipolar    Early Death Sister 61        Liver Cancer    Cancer Sister         Liver Cancer    Stomach Cancer Neg Hx          Review of Systems:  Constitutional: No weight loss, no fevers. Eyes: No problems with vision. ENT: No nose or sinus problems, no oral problems, no throat problems or hoarseness. Cardiovascular: No chest pain, no leg pain with walking, no palpitations, no ankle swelling. Respiratory: No shortness of breath, no persistent cough, no wheezing. Endocrine: No increased thirst, no increased urination. Gastrointestinal: No heartburn, no dysphagia, no abdominal pain, no loss of appetite, no nausea or vomiting, + diarrhea, no constipation, no melena, no hematochezia, no sceleral icterus or jaundice. Skin: No rashes. Musculoskeletal: No trouble walking or standing, no joint pain, no muscle pain. Allergy/Immune System: No allergies, no frequent infections. Neurological: No memory difficulties, no temporary blindness, no difficulty speaking, no headaches, no numbness. Psychiatric: No depression, no suicidal ideation, no auditory hallucinations.   Hematological/Lymphatic: No lymphadenopathy, no frequent nose bleeds, no easy bruising. Genitourinary: No penile/vaginal discharge, no pain with urination, no trouble starting urinary stream, no hematuria. Physical Examination  Vital Signs: /78 (Site: Left Upper Arm, Position: Sitting, Cuff Size: Medium Adult)   Pulse 67   Ht 5' 3\" (1.6 m)   Wt 238 lb (108 kg)   SpO2 97%   BMI 42.16 kg/m²  Body mass index is 42.16 kg/m². General: The patient is a 77 y.o. female in No acute distress. EYE: EOMI, Gross visual field was normal. Pupils reactive, The conjunctive was normal, with no erythema. ENT: no lymphadenopathy, oropharynx is without erythema, edema, or exudates, and moist mucus membranes. The nasal mucosa, septum and turbinates were normal without inflammation or edema. Neck: There was no mass on palpitation, tracheal position was in the middle of the neck and there was no enlarged thyroid. There was no JVD. Lungs: The respiratory was not in labor and the patient did not use accessory muscle. Clear to auscultation bilaterally, no wheeze/crackles. Cardiovascular: Regular rate and rhythm, normal S1 & S2, no murmurs, rubs or gallops appreciated. Peripheral pulses were normal and no tenderness. Abdomen: Soft, non-tender, no rebound or guarding or peritoneal features, no masses, no hepatosplenomegaly. Extremities: upper and lower extremities were warm and dry, no clubbing, cyanosis, edema. Neuro: CN II-XII were intact grossly. Sensation was normal on all extremities and the muscle strength was normal and symmetry. Rectum:  There was no fistular, fissure, external hemorrhoid, tenderness, abscess, erythema or discharge    Labs:  CBC  WBC   Date Value Ref Range Status   03/13/2017 6.8 4.0 - 10.5 K/CU MM Final     Hemoglobin   Date Value Ref Range Status   03/13/2017 12.8 12.5 - 16.0 GM/DL Final     Hematocrit   Date Value Ref Range Status   03/13/2017 42.7 37 - 47 % Final     MCV   Date Value Ref Range Status   03/13/2017 86.8 78 - 100 FL Final        Glucose   Date of time was spent on face-to-face consultation. Gretchen Harvey MD PhD [facility]  [address] [facility] [address]  0ffice: [phone]  Fax: [phone]

## 2019-04-04 ENCOUNTER — HOSPITAL ENCOUNTER (OUTPATIENT)
Age: 69
Discharge: HOME | End: 2019-04-04
Payer: MEDICARE

## 2019-04-04 ENCOUNTER — OFFICE VISIT (OUTPATIENT)
Dept: PULMONOLOGY | Age: 69
End: 2019-04-04
Payer: MEDICARE

## 2019-04-04 VITALS
HEART RATE: 74 BPM | SYSTOLIC BLOOD PRESSURE: 132 MMHG | DIASTOLIC BLOOD PRESSURE: 84 MMHG | WEIGHT: 238 LBS | OXYGEN SATURATION: 96 % | BODY MASS INDEX: 42.16 KG/M2

## 2019-04-04 DIAGNOSIS — G47.33 OSA ON CPAP: ICD-10-CM

## 2019-04-04 DIAGNOSIS — I27.21 PULMONARY ARTERIAL HYPERTENSION (HCC): ICD-10-CM

## 2019-04-04 DIAGNOSIS — Z99.89 OSA ON CPAP: ICD-10-CM

## 2019-04-04 DIAGNOSIS — R05.3 PERSISTENT DRY COUGH: ICD-10-CM

## 2019-04-04 DIAGNOSIS — E66.01 MORBID OBESITY WITH BMI OF 40.0-44.9, ADULT (HCC): ICD-10-CM

## 2019-04-04 DIAGNOSIS — R06.02 SOBOE (SHORTNESS OF BREATH ON EXERTION): Primary | ICD-10-CM

## 2019-04-04 LAB
ALBUMIN SERPL-MCNC: 4 GM/DL (ref 3.4–5)
ALP BLD-CCNC: 97 IU/L (ref 40–129)
ALT SERPL-CCNC: 28 U/L (ref 10–40)
ANION GAP SERPL CALCULATED.3IONS-SCNC: 8 MMOL/L (ref 4–16)
AST SERPL-CCNC: 29 IU/L (ref 15–37)
BASOPHILS ABSOLUTE: 0.1 K/CU MM
BASOPHILS RELATIVE PERCENT: 1.1 % (ref 0–1)
BILIRUB SERPL-MCNC: 0.6 MG/DL (ref 0–1)
BILIRUBIN DIRECT: 0.2 MG/DL (ref 0–0.3)
BILIRUBIN, INDIRECT: 0.4 MG/DL (ref 0–0.7)
BUN BLDV-MCNC: 11 MG/DL (ref 6–23)
CALCIUM SERPL-MCNC: 9.7 MG/DL (ref 8.3–10.6)
CHLORIDE BLD-SCNC: 101 MMOL/L (ref 99–110)
CO2: 31 MMOL/L (ref 21–32)
CREAT SERPL-MCNC: 0.7 MG/DL (ref 0.6–1.1)
DIFFERENTIAL TYPE: ABNORMAL
EOSINOPHILS ABSOLUTE: 0.3 K/CU MM
EOSINOPHILS RELATIVE PERCENT: 4.1 % (ref 0–3)
GFR AFRICAN AMERICAN: >60 ML/MIN/1.73M2
GFR NON-AFRICAN AMERICAN: >60 ML/MIN/1.73M2
GLUCOSE BLD-MCNC: 172 MG/DL (ref 70–99)
HCT VFR BLD CALC: 42.1 % (ref 37–47)
HEMOGLOBIN: 12.5 GM/DL (ref 12.5–16)
IMMATURE NEUTROPHIL %: 0.4 % (ref 0–0.43)
LYMPHOCYTES ABSOLUTE: 2.9 K/CU MM
LYMPHOCYTES RELATIVE PERCENT: 36.2 % (ref 24–44)
MCH RBC QN AUTO: 26.9 PG (ref 27–31)
MCHC RBC AUTO-ENTMCNC: 29.7 % (ref 32–36)
MCV RBC AUTO: 90.5 FL (ref 78–100)
MONOCYTES ABSOLUTE: 0.8 K/CU MM
MONOCYTES RELATIVE PERCENT: 9.3 % (ref 0–4)
NUCLEATED RBC %: 0 %
PDW BLD-RTO: 14.1 % (ref 11.7–14.9)
PLATELET # BLD: 193 K/CU MM (ref 140–440)
PMV BLD AUTO: 12.1 FL (ref 7.5–11.1)
POTASSIUM SERPL-SCNC: 4.4 MMOL/L (ref 3.5–5.1)
RBC # BLD: 4.65 M/CU MM (ref 4.2–5.4)
SEGMENTED NEUTROPHILS ABSOLUTE COUNT: 4 K/CU MM
SEGMENTED NEUTROPHILS RELATIVE PERCENT: 48.9 % (ref 36–66)
SODIUM BLD-SCNC: 140 MMOL/L (ref 135–145)
TOTAL IMMATURE NEUTOROPHIL: 0.03 K/CU MM
TOTAL NUCLEATED RBC: 0 K/CU MM
TOTAL PROTEIN: 6 GM/DL (ref 6.4–8.2)
WBC # BLD: 8.1 K/CU MM (ref 4–10.5)

## 2019-04-04 PROCEDURE — 82103 ALPHA-1-ANTITRYPSIN TOTAL: CPT

## 2019-04-04 PROCEDURE — 87329 GIARDIA AG IA: CPT

## 2019-04-04 PROCEDURE — 82248 BILIRUBIN DIRECT: CPT

## 2019-04-04 PROCEDURE — 36415 COLL VENOUS BLD VENIPUNCTURE: CPT

## 2019-04-04 PROCEDURE — 85025 COMPLETE CBC W/AUTO DIFF WBC: CPT

## 2019-04-04 PROCEDURE — 99213 OFFICE O/P EST LOW 20 MIN: CPT | Performed by: NURSE PRACTITIONER

## 2019-04-04 PROCEDURE — 80053 COMPREHEN METABOLIC PANEL: CPT

## 2019-04-04 NOTE — PATIENT INSTRUCTIONS
Patient Education        Shortness of Breath: Care Instructions  Your Care Instructions  Shortness of breath has many causes. Sometimes conditions such as anxiety can lead to shortness of breath. Some people get mild shortness of breath when they exercise. Trouble breathing also can be a symptom of a serious problem, such as asthma, lung disease, emphysema, heart problems, and pneumonia. If your shortness of breath continues, you may need tests and treatment. Watch for any changes in your breathing and other symptoms. Follow-up care is a key part of your treatment and safety. Be sure to make and go to all appointments, and call your doctor if you are having problems. It's also a good idea to know your test results and keep a list of the medicines you take. How can you care for yourself at home? · Do not smoke or allow others to smoke around you. If you need help quitting, talk to your doctor about stop-smoking programs and medicines. These can increase your chances of quitting for good. · Get plenty of rest and sleep. · Take your medicines exactly as prescribed. Call your doctor if you think you are having a problem with your medicine. · Find healthy ways to deal with stress. ? Exercise daily. ? Get plenty of sleep. ? Eat regularly and well. When should you call for help? Call 911 anytime you think you may need emergency care. For example, call if:    · You have severe shortness of breath.     · You have symptoms of a heart attack. These may include:  ? Chest pain or pressure, or a strange feeling in the chest.  ? Sweating. ? Shortness of breath. ? Nausea or vomiting. ? Pain, pressure, or a strange feeling in the back, neck, jaw, or upper belly or in one or both shoulders or arms. ? Lightheadedness or sudden weakness. ? A fast or irregular heartbeat. After you call 911, the  may tell you to chew 1 adult-strength or 2 to 4 low-dose aspirin. Wait for an ambulance.  Do not try to drive yourself.    Call your doctor now or seek immediate medical care if:    · Your shortness of breath gets worse or you start to wheeze. Wheezing is a high-pitched sound when you breathe.     · You wake up at night out of breath or have to prop your head up on several pillows to breathe.     · You are short of breath after only light activity or while at rest.    Watch closely for changes in your health, and be sure to contact your doctor if:    · You do not get better over the next 1 to 2 days. Where can you learn more? Go to https://chpepiceweb.[employer]. org and sign in to your [employer] account. Enter S780 in the [employer] box to learn more about \"Shortness of Breath: Care Instructions. \"     If you do not have an account, please click on the \"Sign Up Now\" link. Current as of: September 5, 2018  Content Version: 11.9  © 5894-7605 [employer]. Care instructions adapted under license by [facility] ([facility]). If you have questions about a medical condition or this instruction, always ask your healthcare professional. [place] [address] any warranty or liability for your use of this information. Patient Education        Pulmonary Hypertension: Care Instructions  Your Care Instructions  Pulmonary hypertension is high blood pressure in the arteries of your lungs. These blood vessels carry blood from the heart to the lungs, where the blood picks up oxygen. The walls of the arteries may get thick, and the arteries may get narrow. When this happens, blood does not flow as well as it should. Pressure builds up in the arteries. Then your heart has to work harder to pump blood through your lungs. There are different types of pulmonary hypertension. They are caused by different things. Causes include other health conditions such as heart or lung problems. Sometimes it can happen without a known cause. When you have this condition, your body gets less oxygen from your blood. This causes symptoms such as shortness of breath and feeling tired, faint, or dizzy. Over time, these symptoms may change or get worse if your heart gets weaker. You may get heart failure. Heart failure means your heart doesn't pump as much blood as your body needs. Treatment can help you feel better and live longer. Your treatment options will depend on the type of pulmonary hypertension you have. It can be hard to learn that you have a problem with your lungs and heart. But there are things you can do to feel better and stay as active as you can. Follow-up care is a key part of your treatment and safety. Be sure to make and go to all appointments, and call your doctor if you are having problems. It's also a good idea to know your test results and keep a list of the medicines you take. How can you care for yourself at home? Medicine    · Be safe with medicines. Take your medicines exactly as prescribed. Call your doctor if you think you are having a problem with your medicine. You will get more details on the specific medicines your doctor prescribes.     · Your doctor may prescribe oxygen therapy. You will get more details on how to use it.     · Talk to your doctor before you take any vitamins, over-the-counter medicine, or herbal products. Don't take ibuprofen (Advil or Motrin) and naproxen (Aleve) without talking to your doctor first.     · If you take a blood thinner, be sure to get instructions about how to take your medicine safely. Blood thinners can cause serious bleeding problems. Activity    · Be as active as you can. Talk to your doctor about making a plan before you start a new activity.     · Ask your doctor if a pulmonary rehabilitation program is right for you.     · Learn how to save your energy. Making small changes in daily activities can make a big impact on how you feel. Staying healthy    · Eat healthy foods, and try to stay at a healthy weight.     · Do not smoke.  Smoking can make this condition worse. If you need help quitting, talk to your doctor about stop-smoking programs and medicines. These can increase your chances of quitting for good.     · Talk to your doctor about preventing pregnancy. You may need to take steps to avoid becoming pregnant. Pregnancy and childbirth can cause changes in the body that could be life-threatening for women who have this condition.     · Avoid colds and flu. Get a pneumococcal vaccine shot. If you have had one before, ask your doctor if you need another dose. Get a flu vaccine every year. If you must be around people with colds or flu, wash your hands often. When should you call for help? Call 911 anytime you think you may need emergency care. For example, call if:    · You have symptoms of sudden heart failure. These may include:  ? Severe trouble breathing. ? A fast or irregular heartbeat. ? Coughing up pink, foamy mucus. ? Passing out.    Call your doctor now or seek immediate medical care if:    · You have new or changed symptoms of heart failure, such as:  ? New or increased shortness of breath. ? New or worse swelling in your legs, ankles, or feet. ? Sudden weight gain, such as more than 2 to 3 pounds in a day or 5 pounds in a week. (Your doctor may suggest a different range of weight gain.)  ? Feeling dizzy or lightheaded or like you may faint. ? Feeling so tired or weak that you cannot do your usual activities. ? Not sleeping well. Shortness of breath wakes you at night. You need extra pillows to prop yourself up to breathe easier.    Watch closely for changes in your health, and be sure to contact your doctor if:    · You have new or worse symptoms. Where can you learn more? Go to https://[employer]. org and sign in to your [employer] account. Enter X980 in the [employer] box to learn more about \"Pulmonary Hypertension: Care Instructions. \"     If you do not have an account, please click on the \"Sign Up

## 2019-04-05 ENCOUNTER — HOSPITAL ENCOUNTER (OUTPATIENT)
Age: 69
Setting detail: SPECIMEN
Discharge: HOME | End: 2019-04-05
Payer: MEDICARE

## 2019-04-05 PROCEDURE — 87046 STOOL CULTR AEROBIC BACT EA: CPT

## 2019-04-05 PROCEDURE — 87324 CLOSTRIDIUM AG IA: CPT

## 2019-04-05 PROCEDURE — 87209 SMEAR COMPLEX STAIN: CPT

## 2019-04-05 PROCEDURE — 87329 GIARDIA AG IA: CPT

## 2019-04-05 PROCEDURE — 87177 OVA AND PARASITES SMEARS: CPT

## 2019-04-05 PROCEDURE — 87077 CULTURE AEROBIC IDENTIFY: CPT

## 2019-04-05 PROCEDURE — 87045 FECES CULTURE AEROBIC BACT: CPT

## 2019-04-06 LAB
ALPHA-1 ANTITRYPSIN: 119 MG/DL (ref 90–200)
ALPHA-1 ANTITRYPSIN: NORMAL MG/DL (ref 90–200)
GIARDIA ANTIGEN STOOL: NEGATIVE
REASON FOR REJECTION: NORMAL
REJECTED TEST: NORMAL

## 2019-04-08 ENCOUNTER — TELEPHONE (OUTPATIENT)
Dept: GASTROENTEROLOGY | Age: 69
End: 2019-04-08

## 2019-04-08 ENCOUNTER — OFFICE VISIT (OUTPATIENT)
Dept: CARDIOLOGY CLINIC | Age: 69
End: 2019-04-08
Payer: MEDICARE

## 2019-04-08 ENCOUNTER — ANESTHESIA EVENT (OUTPATIENT)
Dept: OPERATING ROOM | Age: 69
End: 2019-04-08
Payer: MEDICARE

## 2019-04-08 ENCOUNTER — TELEPHONE (OUTPATIENT)
Dept: CARDIOLOGY CLINIC | Age: 69
End: 2019-04-08

## 2019-04-08 VITALS
WEIGHT: 242.6 LBS | HEART RATE: 60 BPM | HEIGHT: 63 IN | SYSTOLIC BLOOD PRESSURE: 126 MMHG | DIASTOLIC BLOOD PRESSURE: 84 MMHG | BODY MASS INDEX: 42.98 KG/M2

## 2019-04-08 DIAGNOSIS — I25.10 CORONARY ARTERY DISEASE INVOLVING NATIVE CORONARY ARTERY OF NATIVE HEART WITHOUT ANGINA PECTORIS: Primary | ICD-10-CM

## 2019-04-08 LAB
CULTURE: NORMAL
Lab: NORMAL
SPECIMEN: NORMAL

## 2019-04-08 PROCEDURE — G8417 CALC BMI ABV UP PARAM F/U: HCPCS | Performed by: INTERNAL MEDICINE

## 2019-04-08 PROCEDURE — 4040F PNEUMOC VAC/ADMIN/RCVD: CPT | Performed by: INTERNAL MEDICINE

## 2019-04-08 PROCEDURE — 1036F TOBACCO NON-USER: CPT | Performed by: INTERNAL MEDICINE

## 2019-04-08 PROCEDURE — G8399 PT W/DXA RESULTS DOCUMENT: HCPCS | Performed by: INTERNAL MEDICINE

## 2019-04-08 PROCEDURE — 99214 OFFICE O/P EST MOD 30 MIN: CPT | Performed by: INTERNAL MEDICINE

## 2019-04-08 PROCEDURE — G8427 DOCREV CUR MEDS BY ELIG CLIN: HCPCS | Performed by: INTERNAL MEDICINE

## 2019-04-08 PROCEDURE — 93000 ELECTROCARDIOGRAM COMPLETE: CPT | Performed by: INTERNAL MEDICINE

## 2019-04-08 PROCEDURE — 1090F PRES/ABSN URINE INCON ASSESS: CPT | Performed by: INTERNAL MEDICINE

## 2019-04-08 PROCEDURE — 3017F COLORECTAL CA SCREEN DOC REV: CPT | Performed by: INTERNAL MEDICINE

## 2019-04-08 PROCEDURE — G8598 ASA/ANTIPLAT THER USED: HCPCS | Performed by: INTERNAL MEDICINE

## 2019-04-08 PROCEDURE — 1123F ACP DISCUSS/DSCN MKR DOCD: CPT | Performed by: INTERNAL MEDICINE

## 2019-04-08 NOTE — PROGRESS NOTES
1. Hx of anesthesia complications? -- no  2. Hx of difficult airway/intubation? -- no  3. Hx of changed chest pain/CHF exacerbation in last 6 mo. ? -- no  4. Home O2 dependent? -- use at night 2 l  5. Able to climb one flight of stairs w/o SOB? -- no  6. Recent COPD exacerbation or pneumonia/bronchitis that has been treated in last      month? -- no       1. Do not eat or drink anything after 12 midnight (or____hours) unless instructed by your doctor prior to surgery. This includes no water, chewing gum or mints. 2. Follow your directions as prescribed by the doctor for your procedure and medications. 3.Check with your Doctor regarding stopping Plavix, Coumadin, Lovenox,Effient,Pradaxa,Xarelto, Fragmin or other blood thinners and follow their instructions. 4. Do not smoke, and do not drink any alcoholic beverages 24 hours prior to surgery. This includes NA Beer. 5. You may brush your teeth and gargle the morning of surgery. DO NOT SWALLOW WATER   6. You MUST make arrangements for a responsible adult to take you home after your surgery and be able to check on you every couple hours for the day. You will not be allowed to leave alone or drive yourself home. It is strongly suggested someone stay with you the first 24 hrs. Your surgery will be cancelled if you do not have a ride home. 7. Please wear simple, loose fitting clothing to the hospital.  Yael Jovel not bring valuables (money, credit cards, checkbooks, etc.) Do not wear any makeup (including no eye makeup) or nail polish on your fingers or toes. 8. DO NOT wear any jewelry or piercings on day of surgery. All body piercing jewelry must be removed. 9. If you have dentures, they will be removed before going to the OR; we will provide you a container. If you wear contact lenses or glasses, they will be removed; please bring a case for them.              10. If you  have a Living Will and Durable Power of  for Healthcare, please bring in a copy. 11 Please bring picture ID,  insurance card, paperwork from the doctors office    (H & P, Consent, & card for implantable devices).

## 2019-04-08 NOTE — PROGRESS NOTES
Cora Romero MD        OFFICE  FOLLOWUP NOTE    Chief complaints: patient is here for management of  CAD, HTN, DYSLPIDEMIA, DM,leg swelling      Subjective: patient feels better, no chest pain, no shortness of breath, no dizziness, no palpitations    HPI Wendy Bowers is a 77 y. o.year old who  has a past medical history of Acid reflux, Angina at rest [facility]), Arthritis, Asthma, Broken teeth, CAD (coronary artery disease), Chronic back pain, Degenerative joint disease, Diabetes mellitus ([facility] [address] GI bleed, H/O Doppler venous ultrasound, H/O echocardiogram, Hiatal hernia, History of complete ECG, History of echocardiogram, History of nuclear stress test, History of nuclear stress test, Hx of 24 hour EKG monitoring, Hx of cardiovascular stress test, Hx of CT scan, Hx of Doppler ultrasound, Hx of echocardiogram, HX OTHER MEDICAL, Hyperlipidemia, Hypertension, Hyperthyroidism, Hypothyroidism, adult, Kidney stones, Left hip pain, Migraines, Normal cardiac stress test, ORLIN (obstructive sleep apnea), Post PTCA, RECEIVED BLOOD TRANSFUSION, Shortness of breath on exertion, Sleep apnea, Teeth missing, Ulcer, Urinary incontinence, UTI (urinary tract infection), Venous US Dup, Wears glasses, and Wears partial dentures. and presents for management of  CAD, HTN, DYSLPIDEMIA, DM,leg swelling which are well controlled, she was sent by pulmonary for RHC as her rest of ( CT chest and PFT ) WERE normal, she has CAD and LAD PCI iN past and 2 yrs ago had LHC which showed mid LAD stents were patent, however there was 40 % stenosis on the proximal MID LAD segment.       Current Outpatient Medications   Medication Sig Dispense Refill    OXYGEN Inhale into the lungs      quinapril (ACCUPRIL) 40 MG tablet Take 1 tablet by mouth nightly 90 tablet 3    carvedilol (COREG) 12.5 MG tablet Take 1 tablet by mouth 2 times daily (with meals) 1180 tablet 3    atorvastatin (LIPITOR) 20 MG tablet Take 1 tablet by mouth nightly 90 tablet 3  hydrochlorothiazide (HYDRODIURIL) 25 MG tablet Take 1 tablet by mouth daily Takes 12.5 daily 1/2 of a 25 mg tablet 90 tablet 3    metFORMIN (GLUCOPHAGE) 500 MG tablet TK 2 T PO QAM AND 3 T PO QPM WITH MEALS  11    meclizine (ANTIVERT) 25 MG tablet TK 1 T PO  Q 4 H PRF  DIZZINESS  3    B Complex Vitamins (VITAMIN B COMPLEX PO) Take by mouth daily       vitamin D (CHOLECALCIFEROL) 1000 UNIT TABS tablet Take 1,000 Units by mouth daily      ondansetron (ZOFRAN) 4 MG tablet Take 1 tablet by mouth every 8 hours as needed for Nausea or Vomiting 30 tablet 3    ranitidine (ZANTAC) 300 MG tablet TAKE 1 TABLET BY MOUTH EVERY NIGHT 30 tablet 5    HYDROcodone-acetaminophen (NORCO) 5-325 MG per tablet Take 1 tablet by mouth every 6 hours as needed   0    albuterol (PROVENTIL HFA;VENTOLIN HFA) 108 (90 BASE) MCG/ACT inhaler Inhale 2 puffs into the lungs every 6 hours as needed for Wheezing      aspirin 81 MG tablet Take 81 mg by mouth every morning. Last Dose Taken 9-2-14 Due To Scheduled Surgery      glipiZIDE (GLUCOTROL) 5 MG tablet Take 5 mg by mouth 2 times daily (before meals).  levothyroxine (SYNTHROID) 150 MCG tablet Take 150 mcg by mouth nightly.  sitaGLIPtan (JANUVIA) 100 MG tablet Take 100 mg by mouth every morning. No current facility-administered medications for this visit. Allergies: Codeine  Past Medical History:   Diagnosis Date    Acid reflux     Angina at rest [facility]) In Past    Denies Chest Pain At This Time    Arthritis     \"Knees, Back And Hips\"    Asthma     Broken teeth     Upper    CAD (coronary artery disease)     Sees Dr. Camara Shows a couple of heart stents- had stress test in May 2018 all ok\"    Chronic back pain     Degenerative joint disease     Back     Diabetes mellitus ([facility] [address] Dx 2003    GI bleed 11-09    H/O Doppler venous ultrasound 02/11/2019    No significant insufficiency noted.     H/O echocardiogram 07/14/2014    EF 50%, low normal systolic function, no wall motion abnormalities, mild concentric hypertrophy and signs of diastolic dysfunction, mildly dilated left atrium,  no pericardial effusion, mild mitral and tricuspid regurg.  Hiatal hernia     History of complete ECG     07/20/2012=NSR, leftward axis, prob normal for age, poor r-waveprogression, inferior ST-T abnormalities, consider ischemia    History of echocardiogram 01/26/2017    Ejection fraction is visually estimated at 55%. Mild concentric left ventricular hypertrophy. Mildly dilated left atrium. Mild mitral regurgitation is present.  History of nuclear stress test 01/26/2017    cardiolite-moderate ischemia mid anteroseptal,EF70%    History of nuclear stress test 07/12/2018    Normal    Hx of 24 hour EKG monitoring 12/21/2011    brief run of SVT, otherwise noth clinically sig. arrhythmia noted    Hx of cardiovascular stress test 7/25/14 1/23/12 7/14 EF70% normal study 1/23/12=thallium stress=no scintigraphic inducible myocardial ischemia, EF 70%;  03/2011=EF 70%; 03/2010=EF 70%; 12/1997; 02/1997    Hx of CT scan     05/08/2011=CT chest with contrast=examination is moderately degraded by respiratory motion. No evidence for acute pulmonary thromboembolic disease.  Dilated main pulmonary artery suggesting elevated pulm artery pressures    Hx of Doppler ultrasound     bilateral carotid 03/11/2011= no significant stenosis present    Hx of echocardiogram     03/11/2011=mildly hypertreophied left ventricle, EF 60%, mild pulm. htn, mild aortic stenosis; 02/1997   [facility] OTHER MEDICAL     Primary Care Physician Is Dr. Tico Ballard Hyperlipidemia     Hypertension     Hyperthyroidism     Hypothyroidism, adult     Kidney stones Last Episode In 2000's    \"Passed Kidney Stones Three Different Times\"    Left hip pain     \"for months- had hip pain\"for steroid injection 7/5/2016    Migraines     Normal cardiac stress test 05/12/16    EF70% Normal    ORLIN (obstructive sleep apnea)     REPLACEMENT Left     Revision Total Left Knee    KNEE SURGERY Right 1980    KNEE SURGERY Left     Left Knee Bone Graft    KNEE SURGERY Right     Right Knee Bone Graft    OTHER SURGICAL HISTORY      \"Stomach Stapling\" Pre Surgery Weight Was 230 lbs    OTHER SURGICAL HISTORY Left 04/10/2017    left hip steroid injection    PTCA  16    Stenting of the LAD.     SHOULDER ARTHROSCOPY Right 2-10    SHOULDER ARTHROSCOPY Left 09/10/14    TONSILLECTOMY AND ADENOIDECTOMY  1964    TUBAL LIGATION      Done With     WISDOM TOOTH EXTRACTION      All Four Fay Teeth Extracted In Past     Family History   Problem Relation Age of Onset    Cancer Mother         Liver Cancer    Heart Disease Mother     High Blood Pressure Mother     Asthma Mother     Cancer Father         Colon Cancer    Heart Disease Father     High Blood Pressure Father     Colon Cancer Father     Arthritis Sister     Early Death Sister 52    High Blood Pressure Sister     Other Sister         \"Breathing Problem\"    Heart Disease Son         Open Heart Surgery    Diabetes Son     High Blood Pressure Son     Mental Illness Daughter         Bipolar    Early Death Sister 61        Liver Cancer    Cancer Sister         Liver Cancer    Stomach Cancer Neg Hx      Social History     Tobacco Use    Smoking status: Former Smoker     Packs/day: 0.25     Years: 38.00     Pack years: 9.50     Types: Cigarettes     Start date: 1962     Last attempt to quit: 2000     Years since quittin.6    Smokeless tobacco: Never Used    Tobacco comment: \"never a heavy smoker just occ smoker\"   Substance Use Topics    Alcohol use: No      [unfilled]  Review of Systems:   · Constitutional: No Fever or Weight Loss   · Eyes: No Decreased Vision  · ENT: No Headaches, Hearing Loss or Vertigo  · Cardiovascular: + chest pain, dyspnea on exertion, palpitations or loss of consciousness  · Respiratory: No cough or wheezing    · Gastrointestinal: No abdominal pain, appetite loss, blood in stools, constipation, diarrhea or heartburn  · Genitourinary: No dysuria, trouble voiding, or hematuria  · Musculoskeletal:  No gait disturbance, weakness or joint complaints  · Integumentary: No rash or pruritis  · Neurological: No TIA or stroke symptoms  · Psychiatric: No anxiety or depression  · Endocrine: No malaise, fatigue or temperature intolerance  · Hematologic/Lymphatic: No bleeding problems, blood clots or swollen lymph nodes  · Allergic/Immunologic: No nasal congestion or hives  All systems negative except as marked. Objective:  /84   Pulse 60   Ht 5' 3\" (1.6 m)   Wt 242 lb 9.6 oz (110 kg)   BMI 42.97 kg/m²   Wt Readings from Last 3 Encounters:   04/08/19 242 lb 9.6 oz (110 kg)   04/04/19 238 lb (108 kg)   04/02/19 238 lb (108 kg)     Body mass index is 42.97 kg/m². GENERAL - Alert, oriented, pleasant, in no apparent distress,normal grooming  HEENT - pupils are reactive to light and accomodation, cornea intact, conjunctive normal color, ears are normal in exam,throat exam in normal, teeth, gum and palate are normal, oral mucosa is normal without any notation of pallor or cyanosis  Neck - Supple. No jugular venous distention noted. No carotid bruits, no apical -carotid delay  Respiratory:  Normal breath sounds, No respiratory distress, No wheezing, No chest tenderness. ,no use of accessory muscles, diaphragm movement is normal  Cardiovascular: (PMI) apex non displaced,no lifts no thrills, no s3,no s4, Normal heart rate, Normal rhythm, No murmurs, No rubs, No gallops.  Carotid arteries pulse and amplitude are normal no bruit, no abdominal bruit noted ( normal abdominal aorta ausculation), femoral arteries pulse and amplitude are normal no bruit, pedal pulses are normal  Femoral pulses have normal amplitude, no bruits   Extremities - No cyanosis, clubbing, or significant edema, no varicose veins    Abdomen - No masses, tenderness, or organomegaly, no hepato-splenomegally, no bruits  Musculoskeletal - No significant edema, no kyphosis or scoliosis, no deformity in any extremity noted, muscle strength and tone are normal  Skin: no ulcer,no scar,no stasis dermatitis   Neurologic - alert oriented times 3,Cranial nerves II through XII are grossly intact. There were no gross focal neurologic abnormalities. All sensory and motor nerves examined and were normal  Psychiatric: normal mood and affect    Lab Results   Component Value Date    CKTOTAL 164 07/05/2014    CKMB 1.4 12/20/2011    TROPONINI <0.006 01/23/2014    TROPONINI <0.006 12/21/2011     BNP:    Lab Results   Component Value Date    BNP 33 01/23/2014     PT/INR:  No results found for: PTINR  Lab Results   Component Value Date    LABA1C 7.3 (H) 06/16/2017    LABA1C 7.4 (H) 03/08/2017     Lab Results   Component Value Date    CHOL 117 06/16/2017    TRIG 96 06/16/2017    HDL 56 06/16/2017    LDLDIRECT 53 06/16/2017     Lab Results   Component Value Date    ALT 28 04/04/2019    AST 29 04/04/2019     TSH:  No results found for: TSH    Impression:  Db Vazquez is a 77 y. o.year old who  has a past medical history of Acid reflux, Angina at rest [facility]), Arthritis, Asthma, Broken teeth, CAD (coronary artery disease), Chronic back pain, Degenerative joint disease, Diabetes mellitus ([facility] [address] GI bleed, H/O Doppler venous ultrasound, H/O echocardiogram, Hiatal hernia, History of complete ECG, History of echocardiogram, History of nuclear stress test, History of nuclear stress test, Hx of 24 hour EKG monitoring, Hx of cardiovascular stress test, Hx of CT scan, Hx of Doppler ultrasound, Hx of echocardiogram, HX OTHER MEDICAL, Hyperlipidemia, Hypertension, Hyperthyroidism, Hypothyroidism, adult, Kidney stones, Left hip pain, Migraines, Normal cardiac stress test, ORLIN (obstructive sleep apnea), Post PTCA, RECEIVED BLOOD TRANSFUSION, Shortness of breath on exertion, Sleep apnea, Teeth missing, Ulcer, Urinary incontinence, UTI (urinary tract infection), Venous US Dup, Wears glasses, and Wears partial dentures. and presents with     Plan:  1. CAD AND h/o PCI, LHC. RHC scheduled, LHC scheduled,risk and benefit explained and consent obtained,chest pain Continue aspirin, BB, ace inhibitor and statims  2. Leg swelling:venous doppler is normal, recommend compression socks  3. DM: stable continue metformin    4. Dyslipidemia: continue statins  5. HTN: stable, continue coreg and lisinopril       All labs, medications and tests reviewed, continue all other medications of all above medical condition listed as is.

## 2019-04-08 NOTE — TELEPHONE ENCOUNTER
Pt here in office & educated on L&RHC for Dx: CAD, Chest Pain, scheduled for 4/30/2019 @ 8 AM, w/arrival @ 6 AM, @ [facility]; risks explained; & consents signed. Pre-admission orders given to pt for labs & CXR, which are due 4/28 or 4/29 @ [facility]. Instructions given to pt to:  NPO after midnight night before procedure; Call hospital @ [phone] to pre-register; May take rest of morning meds. am of procedure; & pt voiced understanding. Copies of consent, pre-testing orders, & info. sheet given to Rosalia.

## 2019-04-09 ASSESSMENT — ENCOUNTER SYMPTOMS: SHORTNESS OF BREATH: 1

## 2019-04-09 NOTE — ANESTHESIA PRE PROCEDURE
Department of Anesthesiology  Preprocedure Note       Name:  Herlinda Carcamo   Age:  77 y.o.  :  1950                                          MRN:  [id number]         Date:  2019      Surgeon: Es Bueno):  Kimmie Montelongo MD    Procedure: COLONOSCOPY DIAGNOSTIC (N/A )    Medications prior to admission:   Prior to Admission medications    Medication Sig Start Date End Date Taking?  Authorizing Provider   OXYGEN Inhale into the lungs    Historical Provider, MD   quinapril (ACCUPRIL) 40 MG tablet Take 1 tablet by mouth nightly 10/17/17   Marlys Bloom MD   carvedilol (COREG) 12.5 MG tablet Take 1 tablet by mouth 2 times daily (with meals) 10/17/17   Marlys Bloom MD   atorvastatin (LIPITOR) 20 MG tablet Take 1 tablet by mouth nightly 10/17/17   Marlys Bloom MD   hydrochlorothiazide (HYDRODIURIL) 25 MG tablet Take 1 tablet by mouth daily Takes 12.5 daily 1/2 of a 25 mg tablet 10/17/17   Marlys Bloom MD   metFORMIN (GLUCOPHAGE) 500 MG tablet TK 2 T PO QAM AND 3 T PO QPM WITH MEALS 3/21/17   Historical Provider, MD   meclizine (ANTIVERT) 25 MG tablet TK 1 T PO  Q 4 H PRF  DIZZINESS 16   Historical Provider, MD   B Complex Vitamins (VITAMIN B COMPLEX PO) Take by mouth every morning     Historical Provider, MD   vitamin D (CHOLECALCIFEROL) 1000 UNIT TABS tablet Take 1,000 Units by mouth daily    Historical Provider, MD   ondansetron (ZOFRAN) 4 MG tablet Take 1 tablet by mouth every 8 hours as needed for Nausea or Vomiting 10/25/16   YOBANI Musa - CNP   ranitidine (ZANTAC) 300 MG tablet TAKE 1 TABLET BY MOUTH EVERY NIGHT 16   Ascencion Veloz DO   HYDROcodone-acetaminophen [facility]) 5-325 MG per tablet Take 1 tablet by mouth every 6 hours as needed  3/24/16   Historical Provider, MD   albuterol (PROVENTIL HFA;VENTOLIN HFA) 108 (90 BASE) MCG/ACT inhaler Inhale 2 puffs into the lungs every 6 hours as needed for Wheezing    Historical Provider, MD   aspirin 81 MG tablet Take 81 mg by mouth every morning. Last Dose Taken 9-2-14 Due To Scheduled Surgery    Historical Provider, MD   glipiZIDE (GLUCOTROL) 5 MG tablet Take 5 mg by mouth 2 times daily (before meals). Historical Provider, MD   levothyroxine (SYNTHROID) 150 MCG tablet Take 150 mcg by mouth nightly. Historical Provider, MD       Current medications:    No current facility-administered medications for this encounter. Current Outpatient Medications   Medication Sig Dispense Refill    OXYGEN Inhale into the lungs      quinapril (ACCUPRIL) 40 MG tablet Take 1 tablet by mouth nightly 90 tablet 3    carvedilol (COREG) 12.5 MG tablet Take 1 tablet by mouth 2 times daily (with meals) 1180 tablet 3    atorvastatin (LIPITOR) 20 MG tablet Take 1 tablet by mouth nightly 90 tablet 3    hydrochlorothiazide (HYDRODIURIL) 25 MG tablet Take 1 tablet by mouth daily Takes 12.5 daily 1/2 of a 25 mg tablet 90 tablet 3    metFORMIN (GLUCOPHAGE) 500 MG tablet TK 2 T PO QAM AND 3 T PO QPM WITH MEALS  11    meclizine (ANTIVERT) 25 MG tablet TK 1 T PO  Q 4 H PRF  DIZZINESS  3    B Complex Vitamins (VITAMIN B COMPLEX PO) Take by mouth every morning       vitamin D (CHOLECALCIFEROL) 1000 UNIT TABS tablet Take 1,000 Units by mouth daily      ondansetron (ZOFRAN) 4 MG tablet Take 1 tablet by mouth every 8 hours as needed for Nausea or Vomiting 30 tablet 3    ranitidine (ZANTAC) 300 MG tablet TAKE 1 TABLET BY MOUTH EVERY NIGHT 30 tablet 5    HYDROcodone-acetaminophen (NORCO) 5-325 MG per tablet Take 1 tablet by mouth every 6 hours as needed   0    albuterol (PROVENTIL HFA;VENTOLIN HFA) 108 (90 BASE) MCG/ACT inhaler Inhale 2 puffs into the lungs every 6 hours as needed for Wheezing      aspirin 81 MG tablet Take 81 mg by mouth every morning. Last Dose Taken 9-2-14 Due To Scheduled Surgery      glipiZIDE (GLUCOTROL) 5 MG tablet Take 5 mg by mouth 2 times daily (before meals).         levothyroxine (SYNTHROID) 150 MCG tablet Take 150 mcg by mouth nightly. Allergies: Allergies   Allergen Reactions    Codeine Nausea Only       Problem List:    Patient Active Problem List   Diagnosis Code    Chest pain R07.9    Pulmonary arterial hypertension (HCC) I27.21    Disease of thyroid gland E07.9    Hyperlipidemia E78.5    Hypertension I10    Diabetes mellitus ([facility] [address] E11.9    CAD (coronary artery disease) I25.10    Dyspnea on exertion R06.09    Arthritis of left hip M16.12    Type 2 diabetes mellitus without complication (HCC) A07.9    S/P PTCA (percutaneous transluminal coronary angioplasty) Z98.61    Arthritis M19.90    Radial styloid tenosynovitis of right hand M65.4    S/P cardiac cath Z98.890    Kidney disorder N28.9    Mental disorder F99    Obstructive sleep apnea G47.33       Past Medical History:        Diagnosis Date    Acid reflux     Angina at rest [facility]) In Past    Denies Chest Pain At This Time    Arthritis     \"Knees, Back And Hips\"    Asthma     Broken teeth     Upper    CAD (coronary artery disease)     Sees Dr. Joel Nole a couple of heart stents- had stress test in May 2018 all ok\"    Chronic back pain     Degenerative joint disease     Back     Diabetes mellitus ([facility] [address] Dx 2003    GI bleed 11-09    H/O Doppler venous ultrasound 02/11/2019    No significant insufficiency noted.  H/O echocardiogram 07/14/2014    EF 50%, low normal systolic function, no wall motion abnormalities, mild concentric hypertrophy and signs of diastolic dysfunction, mildly dilated left atrium,  no pericardial effusion, mild mitral and tricuspid regurg.  Hiatal hernia     History of complete ECG     07/20/2012=NSR, leftward axis, prob normal for age, poor r-waveprogression, inferior ST-T abnormalities, consider ischemia    History of echocardiogram 01/26/2017    Ejection fraction is visually estimated at 55%. Mild concentric left ventricular hypertrophy. Mildly dilated left atrium.  Mild mitral regurgitation is present.  History of nuclear stress test 01/26/2017    cardiolite-moderate ischemia mid anteroseptal,EF70%    History of nuclear stress test 07/12/2018    Normal    Hx of 24 hour EKG monitoring 12/21/2011    brief run of SVT, otherwise noth clinically sig. arrhythmia noted    Hx of cardiovascular stress test 7/25/14 1/23/12 7/14 EF70% normal study 1/23/12=thallium stress=no scintigraphic inducible myocardial ischemia, EF 70%;  03/2011=EF 70%; 03/2010=EF 70%; 12/1997; 02/1997    Hx of CT scan     05/08/2011=CT chest with contrast=examination is moderately degraded by respiratory motion. No evidence for acute pulmonary thromboembolic disease.  Dilated main pulmonary artery suggesting elevated pulm artery pressures    Hx of Doppler ultrasound     bilateral carotid 03/11/2011= no significant stenosis present    Hx of echocardiogram     03/11/2011=mildly hypertreophied left ventricle, EF 60%, mild pulm. htn, mild aortic stenosis; 02/1997   [facility] OTHER MEDICAL     Primary Care Physician Is Dr. Marva Oreilly Hyperlipidemia     Hypertension     Hyperthyroidism     Hypothyroidism, adult     Kidney stones Last Episode In 2000's    \"Passed Kidney Stones Three Different Times\"    Left hip pain     \"for months- had hip pain\"for steroid injection 7/5/2016    Migraines     Normal cardiac stress test 05/12/16    EF70% Normal    ORLIN (obstructive sleep apnea)     Post PTCA     2008, 2010, 2016    RECEIVED BLOOD TRANSFUSION 11-09    RECEIVED BLOOD TRANSFUSION, NO REACTION TO THE BLOOD TRANSFUSION RECEIVED    Shortness of breath on exertion     Sleep apnea     NO CPAP- last sleep study done 2011    Teeth missing     Upper And Lower    Ulcer 11-09    Stomach, GI Bleeding, Received Blood Transfusions    Urinary incontinence     \"wear a pad all the time\"    UTI (urinary tract infection) In Past     No Current Symptoms    Venous US Dup     Wears glasses     Wears partial dentures     Upper       Past Surgical History:        Procedure Laterality Date    CARPAL TUNNEL RELEASE Bilateral  Right     1989 Left     SECTION  ,     Tubal Ligation Also Done In  With    T[address] 43  Last Done In 's    COLONOSCOPY  3/23/15    Internal hemorrhoids    CORONARY ANGIOPLASTY  2010    balloon angioplasty of distal mid LAD;     CORONARY ANGIOPLASTY  2008    LAD stent 2.75x16 taxus    CORONARY ANGIOPLASTY  2008    2. 5x8 taxus stent to the ostium of the circ.  DENTAL SURGERY      Teeth Extracted In Past    DIAGNOSTIC CARDIAC CATH LAB PROCEDURE  2010    balloon angioplasty of distal mid LAD;     DIAGNOSTIC CARDIAC CATH LAB PROCEDURE  2008    LAD stent 2.75x16 taxus    DIAGNOSTIC CARDIAC CATH LAB PROCEDURE  2008    2. 5x8 taxus stent to the ostium of the circ.  ENDOSCOPY, COLON, DIAGNOSTIC  \"Several\"    ENDOSCOPY, COLON, DIAGNOSTIC  3/23/15    small hiatal hernia, anatomy consistent with banding, gastritis    FINGER TRIGGER RELEASE  10- \"One On Each Hand Trigger Finger Release\"    - \"Left Middle Finger Trigger Finger Release\"    HYSTERECTOMY      Partial Abdominal Hysterectomy, \"Pinned The Bladder Up\"    JOINT REPLACEMENT Right     Total Right Hip    JOINT REPLACEMENT Right     Total Right Knee    JOINT REPLACEMENT Left     Total Left Knee    JOINT REPLACEMENT Left     Revision Total Left Knee    KNEE SURGERY Right ,     KNEE SURGERY Left     Left Knee Bone Graft    KNEE SURGERY Right     Right Knee Bone Graft    OTHER SURGICAL HISTORY      \"Stomach Stapling\" Pre Surgery Weight Was 230 lbs    OTHER SURGICAL HISTORY Left 04/10/2017    left hip steroid injection    PTCA  16    Stenting of the LAD.     SHOULDER ARTHROSCOPY Right 2-10    SHOULDER ARTHROSCOPY Left 09/10/14    TONSILLECTOMY AND ADENOIDECTOMY  1964    TUBAL LIGATION      Done With     WISDOM TOOTH EXTRACTION      All [place] Teeth Extracted In Past       Social History:    Social History     Tobacco Use    Smoking status: Former Smoker     Packs/day: 0.25     Years: 38.00     Pack years: 9.50     Types: Cigarettes     Start date: 1962     Last attempt to quit: 2000     Years since quittin.6    Smokeless tobacco: Never Used    Tobacco comment: \"never a heavy smoker just occ smoker\"   Substance Use Topics    Alcohol use: No                                Counseling given: Not Answered  Comment: \"never a heavy smoker just occ smoker\"      Vital Signs (Current): There were no vitals filed for this visit. BP Readings from Last 3 Encounters:   19 126/84   19 132/84   19 132/78       NPO Status:                                                                                 BMI:   Wt Readings from Last 3 Encounters:   19 242 lb 9.6 oz (110 kg)   19 238 lb (108 kg)   19 238 lb (108 kg)     There is no height or weight on file to calculate BMI.    CBC:   Lab Results   Component Value Date    WBC 8.1 2019    RBC 4.65 2019    HGB 12.5 2019    HCT 42.1 2019    MCV 90.5 2019    RDW 14.1 2019     2019       CMP:   Lab Results   Component Value Date     2019    K 4.4 2019     2019    CO2 31 2019    BUN 11 2019    CREATININE 0.7 2019    GFRAA >60 2019    LABGLOM >60 2019    GLUCOSE 172 2019    PROT 6.0 2019    PROT 6.6 2013    CALCIUM 9.7 2019    BILITOT 0.6 2019    ALKPHOS 97 2019    AST 29 2019    ALT 28 2019       POC Tests: No results for input(s): POCGLU, POCNA, POCK, POCCL, POCBUN, POCHEMO, POCHCT in the last 72 hours.     Coags:   Lab Results   Component Value Date    PROTIME 11.1 2017    PROTIME 11.8 2011    INR 0.97 2017    APTT 23.3 03/13/2017       HCG (If Applicable): No results found for: PREGTESTUR, PREGSERUM, HCG, HCGQUANT     ABGs: No results found for: PHART, PO2ART, QTT1BDP, CQU3LHY, BEART, T3RFXMFL     Type & Screen (If Applicable):  No results found for: LABABO, [address]    Anesthesia Evaluation  Patient summary reviewed no history of anesthetic complications:   Airway: Mallampati: III  TM distance: >3 FB   Neck ROM: limited  Mouth opening: < 3 FB Dental:          Pulmonary:normal exam    (+) shortness of breath:  sleep apnea:  asthma:                            Cardiovascular:    (+) hypertension:, angina:, CAD:, ECHAVARRIA:, pulmonary hypertension:,             Echocardiogram reviewed         Beta Blocker:  Dose within 24 Hrs      ROS comment: EF 50%, low normal systolic function, no wall motion abnormalities, mild concentric hypertrophy and signs of diastolic dysfunction, mildly dilated left atrium,  no pericardial effusion, mild mitral and tricuspid regurg. Neuro/Psych:   (+) headaches:, psychiatric history:            GI/Hepatic/Renal:   (+) hiatal hernia, GERD:, bowel prep, morbid obesity          Endo/Other:    (+) DiabetesType II DM, , hypothyroidism::., .                 Abdominal:           Vascular:                                      Anesthesia Plan      MAC     ASA 3       Induction: intravenous. Anesthetic plan and risks discussed with patient.                     YOBANI Bernardo - CRNA   4/9/2019

## 2019-04-10 ENCOUNTER — ANESTHESIA (OUTPATIENT)
Dept: OPERATING ROOM | Age: 69
End: 2019-04-10
Payer: MEDICARE

## 2019-04-10 ENCOUNTER — HOSPITAL ENCOUNTER (OUTPATIENT)
Age: 69
Setting detail: OUTPATIENT SURGERY
Discharge: HOME | End: 2019-04-10
Attending: INTERNAL MEDICINE | Admitting: INTERNAL MEDICINE
Payer: MEDICARE

## 2019-04-10 VITALS — OXYGEN SATURATION: 98 % | DIASTOLIC BLOOD PRESSURE: 50 MMHG | SYSTOLIC BLOOD PRESSURE: 112 MMHG

## 2019-04-10 VITALS
HEART RATE: 60 BPM | BODY MASS INDEX: 40.46 KG/M2 | HEIGHT: 64 IN | SYSTOLIC BLOOD PRESSURE: 119 MMHG | TEMPERATURE: 97.8 F | DIASTOLIC BLOOD PRESSURE: 73 MMHG | WEIGHT: 237 LBS | OXYGEN SATURATION: 97 % | RESPIRATION RATE: 16 BRPM

## 2019-04-10 PROBLEM — R19.8 CHANGE IN BOWEL FUNCTION: Status: ACTIVE | Noted: 2019-04-10

## 2019-04-10 LAB
GLUCOSE BLD-MCNC: 178 MG/DL (ref 70–99)
OVA AND PARASITE WET MOUNT: NEGATIVE
OVA AND PARASITE WET MOUNT: NORMAL
TRICHROME SMEAR, STOOL O&P: NEGATIVE

## 2019-04-10 PROCEDURE — 3700000001 HC ADD 15 MINUTES (ANESTHESIA): Performed by: INTERNAL MEDICINE

## 2019-04-10 PROCEDURE — 88305 TISSUE EXAM BY PATHOLOGIST: CPT

## 2019-04-10 PROCEDURE — 7100000010 HC PHASE II RECOVERY - FIRST 15 MIN: Performed by: INTERNAL MEDICINE

## 2019-04-10 PROCEDURE — 2580000003 HC RX 258: Performed by: INTERNAL MEDICINE

## 2019-04-10 PROCEDURE — 3609010600 HC COLONOSCOPY POLYPECTOMY SNARE/COLD BIOPSY: Performed by: INTERNAL MEDICINE

## 2019-04-10 PROCEDURE — 45380 COLONOSCOPY AND BIOPSY: CPT | Performed by: INTERNAL MEDICINE

## 2019-04-10 PROCEDURE — 2709999900 HC NON-CHARGEABLE SUPPLY: Performed by: INTERNAL MEDICINE

## 2019-04-10 PROCEDURE — 6360000002 HC RX W HCPCS: Performed by: NURSE ANESTHETIST, CERTIFIED REGISTERED

## 2019-04-10 PROCEDURE — 7100000011 HC PHASE II RECOVERY - ADDTL 15 MIN: Performed by: INTERNAL MEDICINE

## 2019-04-10 PROCEDURE — 82962 GLUCOSE BLOOD TEST: CPT

## 2019-04-10 PROCEDURE — 3700000000 HC ANESTHESIA ATTENDED CARE: Performed by: INTERNAL MEDICINE

## 2019-04-10 PROCEDURE — 2500000003 HC RX 250 WO HCPCS: Performed by: NURSE ANESTHETIST, CERTIFIED REGISTERED

## 2019-04-10 RX ORDER — PROPOFOL 10 MG/ML
INJECTION, EMULSION INTRAVENOUS PRN
Status: DISCONTINUED | OUTPATIENT
Start: 2019-04-10 | End: 2019-04-10 | Stop reason: SDUPTHER

## 2019-04-10 RX ORDER — SODIUM CHLORIDE, SODIUM LACTATE, POTASSIUM CHLORIDE, CALCIUM CHLORIDE 600; 310; 30; 20 MG/100ML; MG/100ML; MG/100ML; MG/100ML
INJECTION, SOLUTION INTRAVENOUS CONTINUOUS
Status: DISCONTINUED | OUTPATIENT
Start: 2019-04-10 | End: 2019-04-10 | Stop reason: HOSPADM

## 2019-04-10 RX ORDER — LIDOCAINE HYDROCHLORIDE 20 MG/ML
INJECTION, SOLUTION EPIDURAL; INFILTRATION; INTRACAUDAL; PERINEURAL PRN
Status: DISCONTINUED | OUTPATIENT
Start: 2019-04-10 | End: 2019-04-10 | Stop reason: SDUPTHER

## 2019-04-10 RX ORDER — ESMOLOL HYDROCHLORIDE 10 MG/ML
INJECTION INTRAVENOUS PRN
Status: DISCONTINUED | OUTPATIENT
Start: 2019-04-10 | End: 2019-04-10 | Stop reason: SDUPTHER

## 2019-04-10 RX ADMIN — ESMOLOL HYDROCHLORIDE 20 MG: 10 INJECTION, SOLUTION INTRAVENOUS at 09:32

## 2019-04-10 RX ADMIN — LIDOCAINE HYDROCHLORIDE 60 MG: 20 INJECTION, SOLUTION EPIDURAL; INFILTRATION; INTRACAUDAL; PERINEURAL at 09:18

## 2019-04-10 RX ADMIN — ESMOLOL HYDROCHLORIDE 20 MG: 10 INJECTION, SOLUTION INTRAVENOUS at 09:24

## 2019-04-10 RX ADMIN — PROPOFOL 50 MG: 10 INJECTION, EMULSION INTRAVENOUS at 09:24

## 2019-04-10 RX ADMIN — PROPOFOL 150 MG: 10 INJECTION, EMULSION INTRAVENOUS at 09:18

## 2019-04-10 RX ADMIN — PROPOFOL 50 MG: 10 INJECTION, EMULSION INTRAVENOUS at 09:29

## 2019-04-10 RX ADMIN — PROPOFOL 50 MG: 10 INJECTION, EMULSION INTRAVENOUS at 09:33

## 2019-04-10 RX ADMIN — ESMOLOL HYDROCHLORIDE 20 MG: 10 INJECTION, SOLUTION INTRAVENOUS at 09:28

## 2019-04-10 RX ADMIN — SODIUM CHLORIDE, POTASSIUM CHLORIDE, SODIUM LACTATE AND CALCIUM CHLORIDE: 600; 310; 30; 20 INJECTION, SOLUTION INTRAVENOUS at 08:11

## 2019-04-10 ASSESSMENT — PAIN SCALES - GENERAL
PAINLEVEL_OUTOF10: 0
PAINLEVEL_OUTOF10: 0

## 2019-04-10 ASSESSMENT — PAIN - FUNCTIONAL ASSESSMENT: PAIN_FUNCTIONAL_ASSESSMENT: 0-10

## 2019-04-10 NOTE — OP NOTE
BRIEF COLONOSCOPY REPORT:    Pre-op Diagnosis: change bowel habit    Post-op Diagnosis: see impression below    Anesthesia: MAC  Estimated blood loss: less than 50 cc  Implants: none  Drains: none  Complications: none  Specimens- none- or as referred to below     Impression:    1) two small polyps were removed     Suggest:   1) repeat it in 5 years due to h/o polyps     The complete operative report (including photos) is available in the following locations:   1) soft chart now   2) report will also be scanned and can then be found by going to \"chart review\" then \"notes\" then \"op report\" or by going to \"chart review\" then \"media\" then \"op report\". For review of photos, may need to go to page 2.

## 2019-04-10 NOTE — H&P
Reason for Visit: colon polyps and irregular BM    HPI: The patient came here for a colonoscopy. There was no contraindication for the colonoscopy. She no longer has loose stool and had irregular BM. Her stool was formed and sometimes, she won't have BM for a few days then she had a formed stool afterwards. PMH:    Past Medical History:   Diagnosis Date    Acid reflux     Angina at rest [facility]) In Past    Denies Chest Pain At This Time    Arthritis     \"Knees, Back And Hips\"    Asthma     Broken teeth     Upper    CAD (coronary artery disease)     Sees Dr. Erlin Bennett a couple of heart stents- had stress test in May 2018 all ok\"    Chronic back pain     Degenerative joint disease     Back     Diabetes mellitus ([facility] [address] Dx 2003    GI bleed 11-09    H/O Doppler venous ultrasound 02/11/2019    No significant insufficiency noted.  H/O echocardiogram 07/14/2014    EF 50%, low normal systolic function, no wall motion abnormalities, mild concentric hypertrophy and signs of diastolic dysfunction, mildly dilated left atrium,  no pericardial effusion, mild mitral and tricuspid regurg.  Hiatal hernia     History of complete ECG     07/20/2012=NSR, leftward axis, prob normal for age, poor r-waveprogression, inferior ST-T abnormalities, consider ischemia    History of echocardiogram 01/26/2017    Ejection fraction is visually estimated at 55%. Mild concentric left ventricular hypertrophy. Mildly dilated left atrium. Mild mitral regurgitation is present.  History of nuclear stress test 01/26/2017    cardiolite-moderate ischemia mid anteroseptal,EF70%    History of nuclear stress test 07/12/2018    Normal    Hx of 24 hour EKG monitoring 12/21/2011    brief run of SVT, otherwise noth clinically sig.  arrhythmia noted    Hx of cardiovascular stress test 7/25/14 1/23/12 7/14 EF70% normal study 1/23/12=thallium stress=no scintigraphic inducible myocardial ischemia, EF 70%;  03/2011=EF 70%; 03/2010=EF 70%; 1997; 1997    Hx of CT scan     2011=CT chest with contrast=examination is moderately degraded by respiratory motion. No evidence for acute pulmonary thromboembolic disease.  Dilated main pulmonary artery suggesting elevated pulm artery pressures    Hx of Doppler ultrasound     bilateral carotid 2011= no significant stenosis present    Hx of echocardiogram     2011=mildly hypertreophied left ventricle, EF 60%, mild pulm. htn, mild aortic stenosis; 1997    HX OTHER MEDICAL     Primary Care Physician Is Dr. Chanda Resendez Hyperlipidemia     Hypertension     Hyperthyroidism     Hypothyroidism, adult     Kidney stones Last Episode In     \"Passed Kidney Stones Three Different Times\"    Left hip pain     \"for months- had hip pain\"for steroid injection 2016    Migraines     Normal cardiac stress test 16    EF70% Normal    ORLIN (obstructive sleep apnea)     doesnt use cpap, uses o2 2l as needed at night    Post PTCA     , ,     RECEIVED BLOOD TRANSFUSION     RECEIVED BLOOD TRANSFUSION, NO REACTION TO THE BLOOD TRANSFUSION RECEIVED    Shortness of breath on exertion     Sleep apnea     NO CPAP- last sleep study done     Teeth missing     Upper And Lower    Ulcer     Stomach, GI Bleeding, Received Blood Transfusions    Urinary incontinence     \"wear a pad all the time\"    UTI (urinary tract infection) In Past     No Current Symptoms    Venous US Dup     Wears glasses     Wears partial dentures     Upper       PSH:    Past Surgical History:   Procedure Laterality Date    CARPAL TUNNEL RELEASE Bilateral  Right     1989 Left     SECTION  ,     Tubal Ligation Also Done In  With     CHOLECYSTECTOMY      COLONOSCOPY  Last Done In     COLONOSCOPY  3/23/15    Internal hemorrhoids    CORONARY ANGIOPLASTY  2010    balloon angioplasty of distal mid LAD;     CORONARY ANGIOPLASTY  2008 LAD stent 2.75x16 taxus    CORONARY ANGIOPLASTY  2008    2. 5x8 taxus stent to the ostium of the circ.  DENTAL SURGERY      Teeth Extracted In Past    DIAGNOSTIC CARDIAC CATH LAB PROCEDURE  2010    balloon angioplasty of distal mid LAD;     DIAGNOSTIC CARDIAC CATH LAB PROCEDURE  2008    LAD stent 2.75x16 taxus    DIAGNOSTIC CARDIAC CATH LAB PROCEDURE  2008    2. 5x8 taxus stent to the ostium of the circ.  ENDOSCOPY, COLON, DIAGNOSTIC  \"Several\"    ENDOSCOPY, COLON, DIAGNOSTIC  3/23/15    small hiatal hernia, anatomy consistent with banding, gastritis    FINGER TRIGGER RELEASE  10-09 \"One On Each Hand Trigger Finger Release\"     \"Left Middle Finger Trigger Finger Release\"    HYSTERECTOMY      Partial Abdominal Hysterectomy, \"Pinned The Bladder Up\"    JOINT REPLACEMENT Right     Total Right Hip    JOINT REPLACEMENT Right     Total Right Knee    JOINT REPLACEMENT Left     Total Left Knee    JOINT REPLACEMENT Left     Revision Total Left Knee    KNEE SURGERY Right ,     KNEE SURGERY Left     Left Knee Bone Graft    KNEE SURGERY Right     Right Knee Bone Graft    OTHER SURGICAL HISTORY      \"Stomach Stapling\" Pre Surgery Weight Was 230 lbs    OTHER SURGICAL HISTORY Left 04/10/2017    left hip steroid injection    PTCA  16    Stenting of the LAD.  SHOULDER ARTHROSCOPY Right 2-10    SHOULDER ARTHROSCOPY Left 09/10/14    TONSILLECTOMY AND ADENOIDECTOMY  1964    TUBAL LIGATION      Done With     WISDOM TOOTH EXTRACTION      All [place] Teeth Extracted In Past       Medications:    No current facility-administered medications on file prior to encounter.       Current Outpatient Medications on File Prior to Encounter   Medication Sig Dispense Refill    OXYGEN Inhale into the lungs      carvedilol (COREG) 12.5 MG tablet Take 1 tablet by mouth 2 times daily (with meals) 1180 tablet 3    hydrochlorothiazide (HYDRODIURIL) 25 MG tablet Take 1 tablet by mouth daily Takes 12.5 daily 1/2 of a 25 mg tablet 90 tablet 3    metFORMIN (GLUCOPHAGE) 500 MG tablet TK 2 T PO QAM AND 3 T PO QPM WITH MEALS  11    meclizine (ANTIVERT) 25 MG tablet TK 1 T PO  Q 4 H PRF  DIZZINESS  3    B Complex Vitamins (VITAMIN B COMPLEX PO) Take by mouth every morning       vitamin D (CHOLECALCIFEROL) 1000 UNIT TABS tablet Take 1,000 Units by mouth daily      ondansetron (ZOFRAN) 4 MG tablet Take 1 tablet by mouth every 8 hours as needed for Nausea or Vomiting 30 tablet 3    albuterol (PROVENTIL HFA;VENTOLIN HFA) 108 (90 BASE) MCG/ACT inhaler Inhale 2 puffs into the lungs every 6 hours as needed for Wheezing      aspirin 81 MG tablet Take 81 mg by mouth every morning. Last Dose Taken 9-2-14 Due To Scheduled Surgery      glipiZIDE (GLUCOTROL) 5 MG tablet Take 5 mg by mouth 2 times daily (before meals).  quinapril (ACCUPRIL) 40 MG tablet Take 1 tablet by mouth nightly 90 tablet 3    atorvastatin (LIPITOR) 20 MG tablet Take 1 tablet by mouth nightly 90 tablet 3    ranitidine (ZANTAC) 300 MG tablet TAKE 1 TABLET BY MOUTH EVERY NIGHT 30 tablet 5    HYDROcodone-acetaminophen (NORCO) 5-325 MG per tablet Take 1 tablet by mouth every 6 hours as needed   0    levothyroxine (SYNTHROID) 150 MCG tablet Take 150 mcg by mouth nightly. Allergies:    Allergies   Allergen Reactions    Codeine Nausea Only       Social History:    Social History     Socioeconomic History    Marital status:      Spouse name: Not on file    Number of children: Not on file    Years of education: Not on file    Highest education level: Not on file   Occupational History    Not on file   Social Needs    Financial resource strain: Not on file    Food insecurity:     Worry: Not on file     Inability: Not on file    Transportation needs:     Medical: Not on file     Non-medical: Not on file   Tobacco Use    Smoking status: Former Smoker     Packs/day: 0.25     Years: 38.00     Pack years: 9.50     Types: Cigarettes     Start date: 1962     Last attempt to quit: 2000     Years since quittin.6    Smokeless tobacco: Never Used    Tobacco comment: \"never a heavy smoker just occ smoker\"   Substance and Sexual Activity    Alcohol use: No    Drug use: No     Comment: \"When I Quit I Was Smoking About 2 Two Packs Of Cigarettes A Week\"    Sexual activity: Yes     Partners: Male   Lifestyle    Physical activity:     Days per week: Not on file     Minutes per session: Not on file    Stress: Not on file   Relationships    Social connections:     Talks on phone: Not on file     Gets together: Not on file     Attends [employer] service: Not on file     Active member of club or organization: Not on file     Attends meetings of clubs or organizations: Not on file     Relationship status: Not on file    Intimate partner violence:     Fear of current or ex partner: Not on file     Emotionally abused: Not on file     Physically abused: Not on file     Forced sexual activity: Not on file   Other Topics Concern    Not on file   Social History Narrative    Not on file       Family History:        Problem Relation Age of Onset    Cancer Mother         Liver Cancer    Heart Disease Mother     High Blood Pressure Mother     Asthma Mother     Cancer Father         Colon Cancer    Heart Disease Father     High Blood Pressure Father     Colon Cancer Father     Arthritis Sister     Early Death Sister 52    High Blood Pressure Sister     Other Sister         \"Breathing Problem\"    Heart Disease Son         Open Heart Surgery    Diabetes Son     High Blood Pressure Son     Mental Illness Daughter         Bipolar    Early Death Sister 61        Liver Cancer    Cancer Sister         Liver Cancer    Stomach Cancer Neg Hx          Review of Systems:  Constitutional: No weight loss, no fevers. Eyes: No problems with vision.   ENT: No nose or sinus problems, no oral problems, no throat problems or hoarseness. Cardiovascular: No chest pain, no leg pain with walking, no palpitations, no ankle swelling. Respiratory: No shortness of breath, no persistent cough, no wheezing. Endocrine: No increased thirst, no increased urination. Gastrointestinal: No heartburn, no dysphagia, no abdominal pain, no loss of appetite, no nausea or vomiting, no diarrhea, no constipation, no melena, no hematochezia, no sceleral icterus or jaundice. Skin: No rashes. Musculoskeletal: No trouble walking or standing, no joint pain, no muscle pain. Allergy/Immune System: No allergies, no frequent infections. Neurological: No memory difficulties, no temporary blindness, no difficulty speaking, no headaches, no numbness. Psychiatric: No depression, no suicidal ideation, no auditory hallucinations. Hematological/Lymphatic: No lymphadenopathy, no frequent nose bleeds, no easy bruising. Genitourinary: No penile/vaginal discharge, no pain with urination, no trouble starting urinary stream, no hematuria. Physical Examination  Vital Signs: /88   Pulse 92   Temp 97 °F (36.1 °C) (Temporal)   Resp 16   Ht 5' 3.5\" (1.613 m)   Wt 237 lb (107.5 kg)   SpO2 95%   BMI 41.32 kg/m²  Body mass index is 41.32 kg/m². General: The patient is a 77 y.o. female in No acute distress. EYE: EOMI, Gross visual field was normal. Pupils reactive, The conjunctive was normal, with no erythema. ENT: no lymphadenopathy, oropharynx is without erythema, edema, or exudates, and moist mucus membranes. The nasal mucosa, septum and turbinates were normal without inflammation or edema. Neck: There was no mass on palpitation, tracheal position was in the middle of the neck and there was no enlarged thyroid. There was no JVD. Lungs: The respiratory was not in labor and the patient did not use accessory muscle. Clear to auscultation bilaterally, no wheeze/crackles.   Cardiovascular: Regular rate and rhythm, normal S1 & S2, no murmurs, rubs or gallops appreciated. Peripheral pulses were normal and no tenderness. Abdomen: Soft, non-tender, no rebound or guarding or peritoneal features, no masses, no hepatosplenomegaly. Extremities: upper and lower extremities were warm and dry, no clubbing, cyanosis, edema. Neuro: CN II-XII were intact grossly. Sensation was normal on all extremities and the muscle strength was normal and symmetry. Rectum: There was no fistular, fissure, external hemorrhoid, tenderness, abscess, erythema or discharge    Labs:  CBC  WBC   Date Value Ref Range Status   04/04/2019 8.1 4.0 - 10.5 K/CU MM Final     Hemoglobin   Date Value Ref Range Status   04/04/2019 12.5 12.5 - 16.0 GM/DL Final     Hematocrit   Date Value Ref Range Status   04/04/2019 42.1 37 - 47 % Final     MCV   Date Value Ref Range Status   04/04/2019 90.5 78 - 100 FL Final        Glucose   Date Value Ref Range Status   04/04/2019 172 (H) 70 - 99 MG/DL Final     CO2   Date Value Ref Range Status   04/04/2019 31 21 - 32 MMOL/L Final     BUN   Date Value Ref Range Status   04/04/2019 11 6 - 23 MG/DL Final     Lab Results   Component Value Date    ALT 28 04/04/2019    AST 29 04/04/2019     Lab Results   Component Value Date    AMYLASE 25 03/11/2015     Lab Results   Component Value Date    LIPASE 13 12/16/2016     Lab Results   Component Value Date    ESR 6 10/28/2013     No components found for: CREACTIVEPR  Lab Results   Component Value Date    NORRIS None Detected 03/23/2012    No components found for: ANTISMA, ANTIMITO  No results found for: CEA  No components found for: Aundra Davi, OCCULTBLOOD  Lab Results   Component Value Date    IRON 37 03/08/2017    FERRITIN 23 03/08/2017     No results found for: HAV    Assessment     Assessment and Plan:    colonoscopy    Kassidy Canales MD PhD THE [facility]  [address] [place] [address]  0ffice: [phone]  Fax: [phone]

## 2019-04-10 NOTE — ANESTHESIA POSTPROCEDURE EVALUATION
Department of Anesthesiology  Postprocedure Note    Patient: Tre Post  MRN: [id number]  YOB: 1950  Date of evaluation: 4/10/2019  Time:  9:36 AM     Procedure Summary     Date:  04/10/19 Room / Location:  [place] [id number] 01 / [facility]    Anesthesia Start:  3565 Anesthesia Stop:  0991    Procedure:  COLONOSCOPY POLYPECTOMY SNARE/COLD BIOPSY (N/A ) Diagnosis:  (Diarrhea)    Surgeon:  Jose Mojica MD Responsible Provider:  YOBANI Montgomery CRNA    Anesthesia Type:  MAC ASA Status:  3          Anesthesia Type: MAC    Oskar Phase I:      Oskar Phase II:      Last vitals: Reviewed and per EMR flowsheets.        Anesthesia Post Evaluation    Patient location during evaluation: bedside  Patient participation: complete - patient participated  Level of consciousness: sleepy but conscious  Complications: no

## 2019-04-10 NOTE — PROGRESS NOTES
0384 Received from OR, family at bedside. Vital signs obtained. Denies pain, nausea. Call light in reach. 1000 Dr. Lola Bland at bedside updating family and patient. 1003 Repositioned in bed, sitting up sipping Pepsi without difficulty. 1013 Discharge instructions reviewed with patient/family. 1015 Up to bathroom with assistance. Call light in reach. 1017 Back to room with assistance. Dressing with  at bedside. 1024 Discharge to car.   Mark Parham

## 2019-04-25 NOTE — PROGRESS NOTES
have dry nonproductive cough during the day but mostly the cough is at night. She denies any fever or chills. She denies any chest pain associated with the cough. She denies any vomiting associated with her cough. She completed her PFT with demonstrated normal pulmonary functions. Sleep study demonstrated AHI 4.2, min sat on 2 LPM 90%, she did have irregular ECG with PAC's.      Influenzae vaccination up to date, 9/19/2018  Pneumococcal vaccination up to date, has had both Prevnar 15 and 23    Review of Systems:  CONSTITUTIONAL:  negative for fevers, chills, diaphoresis, activity change, appetite change, night sweats and unexpected weight change, + fatigue   HEENT:  negative for hearing loss,  sinus pressure, nasal congestion, epistaxis and snoring  RESPIRATORY:  + dry cough, no wheezes,   CARDIOVASCULAR:  Negative for chest pain, palpitations, exertional chest pressure/discomfort, edema, syncope  GASTROINTESTINAL: negative for nausea, vomiting, diarrhea, constipation, blood in stool and abdominal pain  GENITOURINARY:  negative for frequency, dysuria and hematuria  HEMATOLOGIC/LYMPHATIC:  negative for easy bruising, bleeding and lymphadenopathy  ALLERGIC/IMMUNOLOGIC:  negative for recurrent infections, angioedema, anaphylaxis and drug reaction  MUSCULOSKELETAL:  negative for acute pain, joint swelling, + decreased range of motion bilateral hips and knee related to surgery, denies muscle weakness, + chronic back pain, knee and hip pain  NEURO: negative for headache, AMS, decrease sensations  SKIN: Negative for rashes or lesions      Objective:     PHYSICAL EXAM:  CONSTITUTIONAL:  awake, alert, cooperative, no apparent distress, and appears stated age, morbid obesity  HEENT:  Supple and nontender,  trachea midline, no adenopathy, no JVD, no wheezing or stridor over neck  CHEST: Chest expansion equal and symmetrical, no intercostal retraction, no increased work of breathing  LUNGS: shallow respiratory effort, she states that with large portable unit she has difficulty get out of the house to walk and pull along unit. We will see about getting a smaller portable unit Goal weight loss of 10% by next visit. Return in about 3 months (around 7/4/2019) for Follow Up, right heart cath, labs.     Electronically signed by YOBANI Jean CNP on 4/25/2019 at 2:27 PM

## 2019-04-29 ENCOUNTER — HOSPITAL ENCOUNTER (OUTPATIENT)
Age: 69
Discharge: HOME | End: 2019-04-29
Payer: MEDICARE

## 2019-04-29 ENCOUNTER — HOSPITAL ENCOUNTER (OUTPATIENT)
Dept: GENERAL RADIOLOGY | Age: 69
Discharge: HOME | End: 2019-04-29
Payer: MEDICARE

## 2019-04-29 DIAGNOSIS — Z01.818 PRE-PROCEDURAL EXAMINATION: ICD-10-CM

## 2019-04-29 LAB
ABO/RH: NORMAL
ANION GAP SERPL CALCULATED.3IONS-SCNC: 12 MMOL/L (ref 4–16)
ANTIBODY SCREEN: NEGATIVE
APTT: 23.5 SECONDS (ref 21.2–33)
BASOPHILS ABSOLUTE: 0.1 K/CU MM
BASOPHILS RELATIVE PERCENT: 0.9 % (ref 0–1)
BUN BLDV-MCNC: 11 MG/DL (ref 6–23)
CALCIUM SERPL-MCNC: 9.5 MG/DL (ref 8.3–10.6)
CHLORIDE BLD-SCNC: 97 MMOL/L (ref 99–110)
CO2: 33 MMOL/L (ref 21–32)
COMMENT: NORMAL
CREAT SERPL-MCNC: 0.7 MG/DL (ref 0.6–1.1)
DIFFERENTIAL TYPE: ABNORMAL
EOSINOPHILS ABSOLUTE: 0.3 K/CU MM
EOSINOPHILS RELATIVE PERCENT: 3.8 % (ref 0–3)
GFR AFRICAN AMERICAN: >60 ML/MIN/1.73M2
GFR NON-AFRICAN AMERICAN: >60 ML/MIN/1.73M2
GLUCOSE BLD-MCNC: 161 MG/DL (ref 70–99)
HCT VFR BLD CALC: 44.7 % (ref 37–47)
HEMOGLOBIN: 13.3 GM/DL (ref 12.5–16)
IMMATURE NEUTROPHIL %: 0.2 % (ref 0–0.43)
INR BLD: 1.01 INDEX
LYMPHOCYTES ABSOLUTE: 2.5 K/CU MM
LYMPHOCYTES RELATIVE PERCENT: 30.5 % (ref 24–44)
MCH RBC QN AUTO: 27.3 PG (ref 27–31)
MCHC RBC AUTO-ENTMCNC: 29.8 % (ref 32–36)
MCV RBC AUTO: 91.8 FL (ref 78–100)
MONOCYTES ABSOLUTE: 0.8 K/CU MM
MONOCYTES RELATIVE PERCENT: 9.8 % (ref 0–4)
NUCLEATED RBC %: 0 %
PDW BLD-RTO: 14.2 % (ref 11.7–14.9)
PLATELET # BLD: 236 K/CU MM (ref 140–440)
PMV BLD AUTO: 12.3 FL (ref 7.5–11.1)
POTASSIUM SERPL-SCNC: 4.7 MMOL/L (ref 3.5–5.1)
PROTHROMBIN TIME: 11.5 SECONDS (ref 9.12–12.5)
RBC # BLD: 4.87 M/CU MM (ref 4.2–5.4)
SEGMENTED NEUTROPHILS ABSOLUTE COUNT: 4.4 K/CU MM
SEGMENTED NEUTROPHILS RELATIVE PERCENT: 54.8 % (ref 36–66)
SODIUM BLD-SCNC: 142 MMOL/L (ref 135–145)
TOTAL IMMATURE NEUTOROPHIL: 0.02 K/CU MM
TOTAL NUCLEATED RBC: 0 K/CU MM
WBC # BLD: 8.1 K/CU MM (ref 4–10.5)

## 2019-04-29 PROCEDURE — 85730 THROMBOPLASTIN TIME PARTIAL: CPT

## 2019-04-29 PROCEDURE — 85025 COMPLETE CBC W/AUTO DIFF WBC: CPT

## 2019-04-29 PROCEDURE — 86850 RBC ANTIBODY SCREEN: CPT

## 2019-04-29 PROCEDURE — 86900 BLOOD TYPING SEROLOGIC ABO: CPT

## 2019-04-29 PROCEDURE — 36415 COLL VENOUS BLD VENIPUNCTURE: CPT

## 2019-04-29 PROCEDURE — 85610 PROTHROMBIN TIME: CPT

## 2019-04-29 PROCEDURE — 80048 BASIC METABOLIC PNL TOTAL CA: CPT

## 2019-04-29 PROCEDURE — 86901 BLOOD TYPING SEROLOGIC RH(D): CPT

## 2019-04-29 PROCEDURE — 71046 X-RAY EXAM CHEST 2 VIEWS: CPT

## 2019-04-30 ENCOUNTER — HOSPITAL ENCOUNTER (OUTPATIENT)
Dept: CARDIAC CATH/INVASIVE PROCEDURES | Age: 69
Discharge: HOME | End: 2019-05-01
Attending: INTERNAL MEDICINE | Admitting: INTERNAL MEDICINE
Payer: MEDICARE

## 2019-04-30 PROBLEM — Z98.62 STATUS POST ANGIOPLASTY: Status: ACTIVE | Noted: 2019-04-30

## 2019-04-30 PROBLEM — Z98.61 PERCUTANEOUS TRANSLUMINAL CORONARY ANGIOPLASTY (PTCA) WITHIN LAST 14 TO 24 MONTHS: Status: ACTIVE | Noted: 2019-04-30

## 2019-04-30 LAB
ACTIVATED CLOTTING TIME, LOW RANGE: >400 SEC
GLUCOSE BLD-MCNC: 166 MG/DL (ref 70–99)
GLUCOSE BLD-MCNC: 168 MG/DL (ref 70–99)
GLUCOSE BLD-MCNC: 182 MG/DL (ref 70–99)
GLUCOSE BLD-MCNC: 233 MG/DL (ref 70–99)

## 2019-04-30 PROCEDURE — 93454 CORONARY ARTERY ANGIO S&I: CPT | Performed by: INTERNAL MEDICINE

## 2019-04-30 PROCEDURE — 2500000003 HC RX 250 WO HCPCS

## 2019-04-30 PROCEDURE — 2709999900 HC NON-CHARGEABLE SUPPLY

## 2019-04-30 PROCEDURE — C1874 STENT, COATED/COV W/DEL SYS: HCPCS

## 2019-04-30 PROCEDURE — 6360000002 HC RX W HCPCS

## 2019-04-30 PROCEDURE — 6360000004 HC RX CONTRAST MEDICATION

## 2019-04-30 PROCEDURE — 6370000000 HC RX 637 (ALT 250 FOR IP): Performed by: INTERNAL MEDICINE

## 2019-04-30 PROCEDURE — C1751 CATH, INF, PER/CENT/MIDLINE: HCPCS

## 2019-04-30 PROCEDURE — C1725 CATH, TRANSLUMIN NON-LASER: HCPCS

## 2019-04-30 PROCEDURE — C1769 GUIDE WIRE: HCPCS

## 2019-04-30 PROCEDURE — C1887 CATHETER, GUIDING: HCPCS

## 2019-04-30 PROCEDURE — 6370000000 HC RX 637 (ALT 250 FOR IP)

## 2019-04-30 PROCEDURE — 93454 CORONARY ARTERY ANGIO S&I: CPT

## 2019-04-30 PROCEDURE — 92928 PRQ TCAT PLMT NTRAC ST 1 LES: CPT

## 2019-04-30 PROCEDURE — 85347 COAGULATION TIME ACTIVATED: CPT

## 2019-04-30 PROCEDURE — 2580000003 HC RX 258: Performed by: INTERNAL MEDICINE

## 2019-04-30 PROCEDURE — 82962 GLUCOSE BLOOD TEST: CPT

## 2019-04-30 PROCEDURE — C1894 INTRO/SHEATH, NON-LASER: HCPCS

## 2019-04-30 PROCEDURE — 92928 PRQ TCAT PLMT NTRAC ST 1 LES: CPT | Performed by: INTERNAL MEDICINE

## 2019-04-30 PROCEDURE — 6370000000 HC RX 637 (ALT 250 FOR IP): Performed by: NURSE PRACTITIONER

## 2019-04-30 RX ORDER — ATORVASTATIN CALCIUM 40 MG/1
80 TABLET, FILM COATED ORAL NIGHTLY
Status: DISCONTINUED | OUTPATIENT
Start: 2019-04-30 | End: 2019-04-30 | Stop reason: SDUPTHER

## 2019-04-30 RX ORDER — SODIUM CHLORIDE 0.9 % (FLUSH) 0.9 %
10 SYRINGE (ML) INJECTION PRN
Status: DISCONTINUED | OUTPATIENT
Start: 2019-04-30 | End: 2019-05-01 | Stop reason: HOSPADM

## 2019-04-30 RX ORDER — HYDROCHLOROTHIAZIDE 25 MG/1
25 TABLET ORAL DAILY
Status: DISCONTINUED | OUTPATIENT
Start: 2019-04-30 | End: 2019-05-01 | Stop reason: HOSPADM

## 2019-04-30 RX ORDER — MECLIZINE HCL 12.5 MG/1
12.5 TABLET ORAL EVERY 6 HOURS SCHEDULED
Status: DISCONTINUED | OUTPATIENT
Start: 2019-04-30 | End: 2019-05-01 | Stop reason: HOSPADM

## 2019-04-30 RX ORDER — HYDROCODONE BITARTRATE AND ACETAMINOPHEN 5; 325 MG/1; MG/1
1 TABLET ORAL EVERY 6 HOURS PRN
Status: DISCONTINUED | OUTPATIENT
Start: 2019-04-30 | End: 2019-05-01 | Stop reason: HOSPADM

## 2019-04-30 RX ORDER — CLOPIDOGREL BISULFATE 75 MG/1
75 TABLET ORAL DAILY
Status: DISCONTINUED | OUTPATIENT
Start: 2019-05-01 | End: 2019-05-01 | Stop reason: HOSPADM

## 2019-04-30 RX ORDER — FAMOTIDINE 20 MG/1
20 TABLET, FILM COATED ORAL 2 TIMES DAILY
Status: DISCONTINUED | OUTPATIENT
Start: 2019-04-30 | End: 2019-05-01 | Stop reason: HOSPADM

## 2019-04-30 RX ORDER — ALBUTEROL SULFATE 90 UG/1
2 AEROSOL, METERED RESPIRATORY (INHALATION) EVERY 6 HOURS PRN
Status: DISCONTINUED | OUTPATIENT
Start: 2019-04-30 | End: 2019-05-01 | Stop reason: HOSPADM

## 2019-04-30 RX ORDER — LEVOTHYROXINE SODIUM 0.15 MG/1
150 TABLET ORAL NIGHTLY
Status: DISCONTINUED | OUTPATIENT
Start: 2019-04-30 | End: 2019-05-01 | Stop reason: HOSPADM

## 2019-04-30 RX ORDER — CARVEDILOL 12.5 MG/1
12.5 TABLET ORAL 2 TIMES DAILY WITH MEALS
Status: DISCONTINUED | OUTPATIENT
Start: 2019-04-30 | End: 2019-05-01 | Stop reason: HOSPADM

## 2019-04-30 RX ORDER — ACETAMINOPHEN 325 MG/1
650 TABLET ORAL EVERY 4 HOURS PRN
Status: DISCONTINUED | OUTPATIENT
Start: 2019-04-30 | End: 2019-05-01 | Stop reason: HOSPADM

## 2019-04-30 RX ORDER — ASPIRIN 81 MG/1
81 TABLET, CHEWABLE ORAL EVERY MORNING
Status: DISCONTINUED | OUTPATIENT
Start: 2019-04-30 | End: 2019-05-01 | Stop reason: HOSPADM

## 2019-04-30 RX ORDER — NICOTINE POLACRILEX 4 MG
15 LOZENGE BUCCAL PRN
Status: DISCONTINUED | OUTPATIENT
Start: 2019-04-30 | End: 2019-05-01 | Stop reason: HOSPADM

## 2019-04-30 RX ORDER — DEXTROSE MONOHYDRATE 50 MG/ML
100 INJECTION, SOLUTION INTRAVENOUS PRN
Status: DISCONTINUED | OUTPATIENT
Start: 2019-04-30 | End: 2019-05-01 | Stop reason: HOSPADM

## 2019-04-30 RX ORDER — LISINOPRIL 20 MG/1
20 TABLET ORAL DAILY
Status: DISCONTINUED | OUTPATIENT
Start: 2019-05-01 | End: 2019-05-01 | Stop reason: HOSPADM

## 2019-04-30 RX ORDER — ATORVASTATIN CALCIUM 20 MG/1
20 TABLET, FILM COATED ORAL NIGHTLY
Status: DISCONTINUED | OUTPATIENT
Start: 2019-04-30 | End: 2019-05-01 | Stop reason: HOSPADM

## 2019-04-30 RX ORDER — NITROGLYCERIN 20 MG/100ML
5 INJECTION INTRAVENOUS CONTINUOUS
Status: DISCONTINUED | OUTPATIENT
Start: 2019-04-30 | End: 2019-05-01 | Stop reason: HOSPADM

## 2019-04-30 RX ORDER — DEXTROSE MONOHYDRATE 25 G/50ML
12.5 INJECTION, SOLUTION INTRAVENOUS PRN
Status: DISCONTINUED | OUTPATIENT
Start: 2019-04-30 | End: 2019-05-01 | Stop reason: HOSPADM

## 2019-04-30 RX ORDER — GLIPIZIDE 5 MG/1
5 TABLET ORAL
Status: DISCONTINUED | OUTPATIENT
Start: 2019-04-30 | End: 2019-05-01 | Stop reason: HOSPADM

## 2019-04-30 RX ORDER — SODIUM CHLORIDE 0.9 % (FLUSH) 0.9 %
10 SYRINGE (ML) INJECTION EVERY 12 HOURS SCHEDULED
Status: DISCONTINUED | OUTPATIENT
Start: 2019-04-30 | End: 2019-05-01 | Stop reason: HOSPADM

## 2019-04-30 RX ADMIN — FAMOTIDINE 20 MG: 20 TABLET ORAL at 22:30

## 2019-04-30 RX ADMIN — INSULIN LISPRO 1 UNITS: 100 INJECTION, SOLUTION INTRAVENOUS; SUBCUTANEOUS at 22:34

## 2019-04-30 RX ADMIN — SODIUM CHLORIDE, PRESERVATIVE FREE 10 ML: 5 INJECTION INTRAVENOUS at 22:31

## 2019-04-30 RX ADMIN — GLIPIZIDE 5 MG: 5 TABLET ORAL at 16:48

## 2019-04-30 RX ADMIN — CARVEDILOL 12.5 MG: 12.5 TABLET, FILM COATED ORAL at 16:48

## 2019-04-30 RX ADMIN — LEVOTHYROXINE SODIUM 150 MCG: 150 TABLET ORAL at 22:30

## 2019-04-30 RX ADMIN — MECLIZINE 12.5 MG: 12.5 TABLET ORAL at 11:49

## 2019-04-30 RX ADMIN — ATORVASTATIN CALCIUM 20 MG: 20 TABLET, FILM COATED ORAL at 22:30

## 2019-04-30 RX ADMIN — MECLIZINE 12.5 MG: 12.5 TABLET ORAL at 16:48

## 2019-04-30 RX ADMIN — INSULIN LISPRO 2 UNITS: 100 INJECTION, SOLUTION INTRAVENOUS; SUBCUTANEOUS at 13:22

## 2019-04-30 RX ADMIN — HYDROCODONE BITARTRATE AND ACETAMINOPHEN 1 TABLET: 5; 325 TABLET ORAL at 10:38

## 2019-04-30 RX ADMIN — ACETAMINOPHEN 650 MG: 325 TABLET ORAL at 14:36

## 2019-04-30 RX ADMIN — HYDROCHLOROTHIAZIDE 25 MG: 25 TABLET ORAL at 10:38

## 2019-04-30 ASSESSMENT — PAIN SCALES - GENERAL
PAINLEVEL_OUTOF10: 4
PAINLEVEL_OUTOF10: 0
PAINLEVEL_OUTOF10: 5

## 2019-04-30 NOTE — H&P
Chief complaints: patient is here for management of  CAD, HTN, DYSLPIDEMIA, DM,leg swelling        Subjective: patient feels better, no chest pain, no shortness of breath, no dizziness, no palpitations     HPI Stef Rucker is a 77 y. o.year old who  has a past medical history of Acid reflux, Angina at rest [facility]), Arthritis, Asthma, Broken teeth, CAD (coronary artery disease), Chronic back pain, Degenerative joint disease, Diabetes mellitus ([facility] [address] GI bleed, H/O Doppler venous ultrasound, H/O echocardiogram, Hiatal hernia, History of complete ECG, History of echocardiogram, History of nuclear stress test, History of nuclear stress test, Hx of 24 hour EKG monitoring, Hx of cardiovascular stress test, Hx of CT scan, Hx of Doppler ultrasound, Hx of echocardiogram, HX OTHER MEDICAL, Hyperlipidemia, Hypertension, Hyperthyroidism, Hypothyroidism, adult, Kidney stones, Left hip pain, Migraines, Normal cardiac stress test, ORLIN (obstructive sleep apnea), Post PTCA, RECEIVED BLOOD TRANSFUSION, Shortness of breath on exertion, Sleep apnea, Teeth missing, Ulcer, Urinary incontinence, UTI (urinary tract infection), Venous US Dup, Wears glasses, and Wears partial dentures.  and presents for management of  CAD, HTN, DYSLPIDEMIA, DM,leg swelling which are well controlled, she was sent by pulmonary for RHC as her rest of ( CT chest and PFT ) WERE normal, she has CAD and LAD PCI iN past and 2 yrs ago had LHC which showed mid LAD stents were patent, however there was 40 % stenosis on the proximal MID LAD segment.        Current Facility-Administered Medications          Current Outpatient Medications   Medication Sig Dispense Refill    OXYGEN Inhale into the lungs        quinapril (ACCUPRIL) 40 MG tablet Take 1 tablet by mouth nightly 90 tablet 3    carvedilol (COREG) 12.5 MG tablet Take 1 tablet by mouth 2 times daily (with meals) 1180 tablet 3    atorvastatin (LIPITOR) 20 MG tablet Take 1 tablet by mouth nightly 90 tablet 3    hydrochlorothiazide (HYDRODIURIL) 25 MG tablet Take 1 tablet by mouth daily Takes 12.5 daily 1/2 of a 25 mg tablet 90 tablet 3    metFORMIN (GLUCOPHAGE) 500 MG tablet TK 2 T PO QAM AND 3 T PO QPM WITH MEALS   11    meclizine (ANTIVERT) 25 MG tablet TK 1 T PO  Q 4 H PRF  DIZZINESS   3    B Complex Vitamins (VITAMIN B COMPLEX PO) Take by mouth daily         vitamin D (CHOLECALCIFEROL) 1000 UNIT TABS tablet Take 1,000 Units by mouth daily        ondansetron (ZOFRAN) 4 MG tablet Take 1 tablet by mouth every 8 hours as needed for Nausea or Vomiting 30 tablet 3    ranitidine (ZANTAC) 300 MG tablet TAKE 1 TABLET BY MOUTH EVERY NIGHT 30 tablet 5    HYDROcodone-acetaminophen (NORCO) 5-325 MG per tablet Take 1 tablet by mouth every 6 hours as needed    0    albuterol (PROVENTIL HFA;VENTOLIN HFA) 108 (90 BASE) MCG/ACT inhaler Inhale 2 puffs into the lungs every 6 hours as needed for Wheezing        aspirin 81 MG tablet Take 81 mg by mouth every morning. Last Dose Taken 9-2-14 Due To Scheduled Surgery        glipiZIDE (GLUCOTROL) 5 MG tablet Take 5 mg by mouth 2 times daily (before meals).        levothyroxine (SYNTHROID) 150 MCG tablet Take 150 mcg by mouth nightly.        sitaGLIPtan (JANUVIA) 100 MG tablet Take 100 mg by mouth every morning.          No current facility-administered medications for this visit.          Allergies: Codeine  Past Medical History        Past Medical History:   Diagnosis Date    Acid reflux      Angina at rest [facility]) In Past     Denies Chest Pain At This Time    Arthritis       \"Knees, Back And Hips\"    Asthma      Broken teeth       Upper    CAD (coronary artery disease)       Sees Dr. John Lucia a couple of heart stents- had stress test in May 2018 all ok\"    Chronic back pain      Degenerative joint disease       Back     Diabetes mellitus ([facility] [address] Dx 2003    GI bleed 11-09    H/O Doppler venous ultrasound 02/11/2019     No significant insufficiency noted.     H/O echocardiogram 07/14/2014     EF 50%, low normal systolic function, no wall motion abnormalities, mild concentric hypertrophy and signs of diastolic dysfunction, mildly dilated left atrium,  no pericardial effusion, mild mitral and tricuspid regurg.  Hiatal hernia      History of complete ECG       07/20/2012=NSR, leftward axis, prob normal for age, poor r-waveprogression, inferior ST-T abnormalities, consider ischemia    History of echocardiogram 01/26/2017     Ejection fraction is visually estimated at 55%. Mild concentric left ventricular hypertrophy. Mildly dilated left atrium. Mild mitral regurgitation is present.  History of nuclear stress test 01/26/2017     cardiolite-moderate ischemia mid anteroseptal,EF70%    History of nuclear stress test 07/12/2018     Normal    Hx of 24 hour EKG monitoring 12/21/2011     brief run of SVT, otherwise noth clinically sig. arrhythmia noted    Hx of cardiovascular stress test 7/25/14 1/23/12 7/14 EF70% normal study 1/23/12=thallium stress=no scintigraphic inducible myocardial ischemia, EF 70%;  03/2011=EF 70%; 03/2010=EF 70%; 12/1997; 02/1997    Hx of CT scan       05/08/2011=CT chest with contrast=examination is moderately degraded by respiratory motion. No evidence for acute pulmonary thromboembolic disease.  Dilated main pulmonary artery suggesting elevated pulm artery pressures    Hx of Doppler ultrasound       bilateral carotid 03/11/2011= no significant stenosis present    Hx of echocardiogram       03/11/2011=mildly hypertreophied left ventricle, EF 60%, mild pulm. htn, mild aortic stenosis; 02/1997    HX OTHER MEDICAL       Primary Care Physician Is Dr. Wilian Merlos Hyperlipidemia      Hypertension      Hyperthyroidism      Hypothyroidism, adult      Kidney stones Last Episode In 2000's     \"Passed Kidney Stones Three Different Times\"    Left hip pain       \"for months- had hip pain\"for steroid injection 7/5/2016    Migraines      Normal cardiac stress test 16     EF70% Normal    ORLIN (obstructive sleep apnea)      Post PTCA       , , 2016    RECEIVED BLOOD TRANSFUSION      RECEIVED BLOOD TRANSFUSION, NO REACTION TO THE BLOOD TRANSFUSION RECEIVED    Shortness of breath on exertion      Sleep apnea       NO CPAP- last sleep study done     Teeth missing       Upper And Lower    Ulcer      Stomach, GI Bleeding, Received Blood Transfusions    Urinary incontinence       \"wear a pad all the time\"    UTI (urinary tract infection) In Past      No Current Symptoms    Venous US Dup      Wears glasses      Wears partial dentures       Upper         Past Surgical History   Past Surgical History:   Procedure Laterality Date    CARPAL TUNNEL RELEASE Bilateral  Right       Left     SECTION   ,      Tubal Ligation Also Done In  With     CHOLECYSTECTOMY       COLONOSCOPY   Last Done In     COLONOSCOPY   3/23/15     Internal hemorrhoids    CORONARY ANGIOPLASTY   2010     balloon angioplasty of distal mid LAD;     CORONARY ANGIOPLASTY   2008     LAD stent 2.75x16 taxus    CORONARY ANGIOPLASTY   2008     2.5x8 taxus stent to the ostium of the circ.  DENTAL SURGERY         Teeth Extracted In Past    DIAGNOSTIC CARDIAC CATH LAB PROCEDURE   2010     balloon angioplasty of distal mid LAD;     DIAGNOSTIC CARDIAC CATH LAB PROCEDURE   2008     LAD stent 2.75x16 taxus    DIAGNOSTIC CARDIAC CATH LAB PROCEDURE   2008     2.5x8 taxus stent to the ostium of the circ.     ENDOSCOPY, COLON, DIAGNOSTIC   \"Several\"    ENDOSCOPY, COLON, DIAGNOSTIC   3/23/15     small hiatal hernia, anatomy consistent with banding, gastritis    FINGER TRIGGER RELEASE   10-09 \"One On Each Hand Trigger Finger Release\"      \"Left Middle Finger Trigger Finger Release\"    HYSTERECTOMY        Partial Abdominal Hysterectomy, \"Pinned The Bladder Up\"    JOINT REPLACEMENT Right      Total Right Hip    JOINT REPLACEMENT Right      Total Right Knee    JOINT REPLACEMENT Left      Total Left Knee    JOINT REPLACEMENT Left      Revision Total Left Knee    KNEE SURGERY Right ,     KNEE SURGERY Left      Left Knee Bone Graft    KNEE SURGERY Right      Right Knee Bone Graft    OTHER SURGICAL HISTORY        \"Stomach Stapling\" Pre Surgery Weight Was 230 lbs    OTHER SURGICAL HISTORY Left 04/10/2017     left hip steroid injection    PTCA   16     Stenting of the LAD.     SHOULDER ARTHROSCOPY Right 2-10    SHOULDER ARTHROSCOPY Left 09/10/14    TONSILLECTOMY AND ADENOIDECTOMY   1964    TUBAL LIGATION        Done With     WISDOM TOOTH EXTRACTION         All [place] Teeth Extracted In Past         Family History         Family History   Problem Relation Age of Onset    Cancer Mother           Liver Cancer    Heart Disease Mother      High Blood Pressure Mother      Asthma Mother      Cancer Father           Colon Cancer    Heart Disease Father      High Blood Pressure Father      Colon Cancer Father      Arthritis Sister      Early Death Sister 52    High Blood Pressure Sister      Other Sister           \"Breathing Problem\"    Heart Disease Son           Open Heart Surgery    Diabetes Son      High Blood Pressure Son      Mental Illness Daughter           Bipolar    Early Death Sister 61         Liver Cancer    Cancer Sister           Liver Cancer    Stomach Cancer Neg Hx           Social History            Tobacco Use    Smoking status: Former Smoker       Packs/day: 0.25       Years: 38.00       Pack years: 9.50       Types: Cigarettes       Start date: 1962       Last attempt to quit: 2000       Years since quittin.6    Smokeless tobacco: Never Used    Tobacco comment: \"never a heavy smoker just occ smoker\"   Substance Use Topics    Alcohol use: No      [unfilled]  Review of Systems:   · Constitutional: No Fever or Weight Loss   · Eyes: No Decreased Vision  · ENT: No Headaches, Hearing Loss or Vertigo  · Cardiovascular: + chest pain, dyspnea on exertion, palpitations or loss of consciousness  · Respiratory: No cough or wheezing    · Gastrointestinal: No abdominal pain, appetite loss, blood in stools, constipation, diarrhea or heartburn  · Genitourinary: No dysuria, trouble voiding, or hematuria  · Musculoskeletal:  No gait disturbance, weakness or joint complaints  · Integumentary: No rash or pruritis  · Neurological: No TIA or stroke symptoms  · Psychiatric: No anxiety or depression  · Endocrine: No malaise, fatigue or temperature intolerance  · Hematologic/Lymphatic: No bleeding problems, blood clots or swollen lymph nodes  · Allergic/Immunologic: No nasal congestion or hives  All systems negative except as marked. Objective:  /84   Pulse 60   Ht 5' 3\" (1.6 m)   Wt 242 lb 9.6 oz (110 kg)   BMI 42.97 kg/m²       Wt Readings from Last 3 Encounters:   04/08/19 242 lb 9.6 oz (110 kg)   04/04/19 238 lb (108 kg)   04/02/19 238 lb (108 kg)      Body mass index is 42.97 kg/m². GENERAL - Alert, oriented, pleasant, in no apparent distress,normal grooming  HEENT - pupils are reactive to light and accomodation, cornea intact, conjunctive normal color, ears are normal in exam,throat exam in normal, teeth, gum and palate are normal, oral mucosa is normal without any notation of pallor or cyanosis  Neck - Supple. No jugular venous distention noted. No carotid bruits, no apical -carotid delay  Respiratory:  Normal breath sounds, No respiratory distress, No wheezing, No chest tenderness. ,no use of accessory muscles, diaphragm movement is normal  Cardiovascular: (PMI) apex non displaced,no lifts no thrills, no s3,no s4, Normal heart rate, Normal rhythm, No murmurs, No rubs, No gallops.  Carotid arteries pulse and amplitude are normal no bruit, no abdominal bruit noted ( normal abdominal echocardiogram, HX OTHER MEDICAL, Hyperlipidemia, Hypertension, Hyperthyroidism, Hypothyroidism, adult, Kidney stones, Left hip pain, Migraines, Normal cardiac stress test, ORLIN (obstructive sleep apnea), Post PTCA, RECEIVED BLOOD TRANSFUSION, Shortness of breath on exertion, Sleep apnea, Teeth missing, Ulcer, Urinary incontinence, UTI (urinary tract infection), Venous US Dup, Wears glasses, and Wears partial dentures. and presents with      Plan:  1. CAD AND h/o PCI, LHC. RHC scheduled, LHC scheduled,risk and benefit explained and consent obtained,chest pain Continue aspirin, BB, ace inhibitor and statims  2. Leg swelling:venous doppler is normal, recommend compression socks  3. DM: stable continue metformin    4. Dyslipidemia: continue statins  5.  HTN: stable, continue coreg and lisinopril         All labs, medications and tests reviewed, continue all other medications of all above medical condition listed as is.

## 2019-05-01 ENCOUNTER — TELEPHONE (OUTPATIENT)
Dept: CARDIOLOGY CLINIC | Age: 69
End: 2019-05-01

## 2019-05-01 VITALS
OXYGEN SATURATION: 97 % | BODY MASS INDEX: 42.52 KG/M2 | TEMPERATURE: 98.3 F | HEART RATE: 76 BPM | DIASTOLIC BLOOD PRESSURE: 83 MMHG | RESPIRATION RATE: 16 BRPM | SYSTOLIC BLOOD PRESSURE: 173 MMHG | HEIGHT: 63 IN | WEIGHT: 240 LBS

## 2019-05-01 LAB
ANION GAP SERPL CALCULATED.3IONS-SCNC: 9 MMOL/L (ref 4–16)
BUN BLDV-MCNC: 13 MG/DL (ref 6–23)
CALCIUM SERPL-MCNC: 9.2 MG/DL (ref 8.3–10.6)
CHLORIDE BLD-SCNC: 95 MMOL/L (ref 99–110)
CO2: 34 MMOL/L (ref 21–32)
CREAT SERPL-MCNC: 0.7 MG/DL (ref 0.6–1.1)
GFR AFRICAN AMERICAN: >60 ML/MIN/1.73M2
GFR NON-AFRICAN AMERICAN: >60 ML/MIN/1.73M2
GLUCOSE BLD-MCNC: 183 MG/DL (ref 70–99)
GLUCOSE BLD-MCNC: 196 MG/DL (ref 70–99)
HCT VFR BLD CALC: 42.3 % (ref 37–47)
HEMOGLOBIN: 12.5 GM/DL (ref 12.5–16)
POTASSIUM SERPL-SCNC: 4.4 MMOL/L (ref 3.5–5.1)
SODIUM BLD-SCNC: 138 MMOL/L (ref 135–145)

## 2019-05-01 PROCEDURE — 36415 COLL VENOUS BLD VENIPUNCTURE: CPT

## 2019-05-01 PROCEDURE — 93005 ELECTROCARDIOGRAM TRACING: CPT | Performed by: INTERNAL MEDICINE

## 2019-05-01 PROCEDURE — 85018 HEMOGLOBIN: CPT

## 2019-05-01 PROCEDURE — 2580000003 HC RX 258: Performed by: INTERNAL MEDICINE

## 2019-05-01 PROCEDURE — 93010 ELECTROCARDIOGRAM REPORT: CPT | Performed by: INTERNAL MEDICINE

## 2019-05-01 PROCEDURE — 85014 HEMATOCRIT: CPT

## 2019-05-01 PROCEDURE — 94761 N-INVAS EAR/PLS OXIMETRY MLT: CPT

## 2019-05-01 PROCEDURE — 82962 GLUCOSE BLOOD TEST: CPT

## 2019-05-01 PROCEDURE — 6370000000 HC RX 637 (ALT 250 FOR IP): Performed by: INTERNAL MEDICINE

## 2019-05-01 PROCEDURE — 6370000000 HC RX 637 (ALT 250 FOR IP): Performed by: NURSE PRACTITIONER

## 2019-05-01 PROCEDURE — 80048 BASIC METABOLIC PNL TOTAL CA: CPT

## 2019-05-01 RX ORDER — CLOPIDOGREL BISULFATE 75 MG/1
75 TABLET ORAL DAILY
Qty: 30 TABLET | Refills: 3 | Status: SHIPPED | OUTPATIENT
Start: 2019-05-02

## 2019-05-01 RX ADMIN — SODIUM CHLORIDE, PRESERVATIVE FREE 10 ML: 5 INJECTION INTRAVENOUS at 07:49

## 2019-05-01 RX ADMIN — HYDROCHLOROTHIAZIDE 25 MG: 25 TABLET ORAL at 07:49

## 2019-05-01 RX ADMIN — HYDROCODONE BITARTRATE AND ACETAMINOPHEN 1 TABLET: 5; 325 TABLET ORAL at 03:54

## 2019-05-01 RX ADMIN — ACETAMINOPHEN 650 MG: 325 TABLET ORAL at 02:53

## 2019-05-01 RX ADMIN — ASPIRIN 81 MG 81 MG: 81 TABLET ORAL at 07:49

## 2019-05-01 RX ADMIN — CARVEDILOL 12.5 MG: 12.5 TABLET, FILM COATED ORAL at 07:49

## 2019-05-01 RX ADMIN — CLOPIDOGREL BISULFATE 75 MG: 75 TABLET ORAL at 07:49

## 2019-05-01 RX ADMIN — FAMOTIDINE 20 MG: 20 TABLET ORAL at 07:49

## 2019-05-01 RX ADMIN — GLIPIZIDE 5 MG: 5 TABLET ORAL at 07:54

## 2019-05-01 RX ADMIN — ACETAMINOPHEN 650 MG: 325 TABLET ORAL at 08:00

## 2019-05-01 RX ADMIN — LISINOPRIL 20 MG: 20 TABLET ORAL at 07:49

## 2019-05-01 RX ADMIN — INSULIN LISPRO 1 UNITS: 100 INJECTION, SOLUTION INTRAVENOUS; SUBCUTANEOUS at 07:49

## 2019-05-01 RX ADMIN — VITAMIN D, TAB 1000IU (100/BT) 1000 UNITS: 25 TAB at 07:49

## 2019-05-01 RX ADMIN — MECLIZINE 12.5 MG: 12.5 TABLET ORAL at 00:08

## 2019-05-01 ASSESSMENT — PAIN SCALES - GENERAL
PAINLEVEL_OUTOF10: 6
PAINLEVEL_OUTOF10: 4
PAINLEVEL_OUTOF10: 7
PAINLEVEL_OUTOF10: 4

## 2019-05-01 ASSESSMENT — PAIN DESCRIPTION - LOCATION: LOCATION: HEAD

## 2019-05-01 ASSESSMENT — PAIN DESCRIPTION - PAIN TYPE: TYPE: ACUTE PAIN

## 2019-05-01 NOTE — DISCHARGE INSTR - DIET

## 2019-05-01 NOTE — DISCHARGE SUMMARY
Patient ID:  Rickie Feliz  [id number] 77 y.o. 1950    Admit date: 4/30/2019    Discharge date:  5/1/2019    Admitting Physician: Inocente Hodgkins, MD     Discharge Physician: Jose Rodríguez APRN-CNP    Admission Diagnoses: Percutaneous transluminal coronary angioplasty (PTCA) within last 15 to 24 months [Z98.61]  Status post angioplasty [Z98.62]  Status post angioplasty [Z98.62]    Discharge Diagnoses:   Patient Active Problem List   Diagnosis    Chest pain    [facility]monary arterial hypertension ([facility] [address]    Disease of thyroid gland    Hyperlipidemia    Hypertension    Diabetes mellitus ([facility] [address]    CAD (coronary artery disease)    Dyspnea on exertion    Arthritis of left hip    Type 2 diabetes mellitus without complication (HCC)    S/P PTCA (percutaneous transluminal coronary angioplasty)    Arthritis    Radial styloid tenosynovitis of right hand    S/P cardiac cath    Kidney disorder    Mental disorder    Obstructive sleep apnea    Change in bowel function    Percutaneous transluminal coronary angioplasty (PTCA) within last 14 to 24 months    Unstable angina ([facility] [address]    Status post angioplasty        Discharged Condition: good    Hospital Course: Rickie Feliz was admitted for elective PCI of LAD. Procedure was successful and patient has tolerated new medications.      Addressing  Aspirin: Continue daily aspirin    Significant Diagnostic Studies:     Labs:   Lab Results   Component Value Date    CREATININE 0.7 05/01/2019    BUN 13 05/01/2019     05/01/2019    K 4.4 05/01/2019    CL 95 (L) 05/01/2019    CO2 34 (H) 05/01/2019      Lab Results   Component Value Date    WBC 8.1 04/29/2019    HGB 12.5 05/01/2019    HCT 42.3 05/01/2019    MCV 91.8 04/29/2019     04/29/2019      Lab Results   Component Value Date    INR 1.01 04/29/2019    PROTIME 11.5 04/29/2019      Lab Results   Component Value Date    BNP 33 01/23/2014   :[email or web address]@[facility]: normal EKG, normal sinus rhythm. Disposition: home    Patient Instructions: Farrah Mcneil   Home Medication Instructions CCO:[id number]    Printed on:05/01/19 1001   Medication Information                      albuterol (PROVENTIL HFA;VENTOLIN HFA) 108 (90 BASE) MCG/ACT inhaler  Inhale 2 puffs into the lungs every 6 hours as needed for Wheezing             aspirin 81 MG tablet  Take 81 mg by mouth every morning. Last Dose Taken 9-2-14 Due To Scheduled Surgery             atorvastatin (LIPITOR) 20 MG tablet  Take 1 tablet by mouth nightly             B Complex Vitamins (VITAMIN B COMPLEX PO)  Take by mouth every morning              carvedilol (COREG) 12.5 MG tablet  Take 1 tablet by mouth 2 times daily (with meals)             clopidogrel (PLAVIX) 75 MG tablet  Take 1 tablet by mouth daily             glipiZIDE (GLUCOTROL) 5 MG tablet  Take 5 mg by mouth 2 times daily (before meals). hydrochlorothiazide (HYDRODIURIL) 25 MG tablet  Take 1 tablet by mouth daily Takes 12.5 daily 1/2 of a 25 mg tablet             HYDROcodone-acetaminophen (NORCO) 5-325 MG per tablet  Take 1 tablet by mouth every 6 hours as needed              levothyroxine (SYNTHROID) 150 MCG tablet  Take 150 mcg by mouth nightly. meclizine (ANTIVERT) 25 MG tablet  TK 1 T PO  Q 4 H PRF  DIZZINESS             metFORMIN (GLUCOPHAGE) 500 MG tablet  TK 2 T PO QAM AND 3 T PO QPM WITH MEALS             ondansetron (ZOFRAN) 4 MG tablet  Take 1 tablet by mouth every 8 hours as needed for Nausea or Vomiting             OXYGEN  Inhale into the lungs             quinapril (ACCUPRIL) 40 MG tablet  Take 1 tablet by mouth nightly             ranitidine (ZANTAC) 300 MG tablet  TAKE 1 TABLET BY MOUTH EVERY NIGHT             vitamin D (CHOLECALCIFEROL) 1000 UNIT TABS tablet  Take 1,000 Units by mouth daily                 Follow-up with Dr. Sonu Man  in 1 week.     Signed:    Electronically signed by YOBANI Myers CNP on 5/1/2019 at 10:04 AM

## 2019-05-01 NOTE — CARE COORDINATION
.CM met with pt for d/c planning. Introduced self and updated white board. Pt lives with spouse and requires some assistance with ADL's. Pt has transportation. She has a PCP, has insurance, and is able to afford all of her medication except her Januvia. Her Dr has been providing her with samples. [facility] info provided. Pt is on O2 24/7, has a walker, cane, and a shower seat. [facility] offered and pt refused. Pt denies any needs at this time. [facility] info provided. D/c plan is home with spouse, no needs. CM will continue to follow.   TE

## 2019-05-01 NOTE — PROGRESS NOTES
Seen by cardiac rehab status post PTCA  Patient is awake alert and up in room  Introduced self and cardiac teaching program  Patient has a history of CAD and has had a stent in the past   Teaching done on A&P of coronary arteries  On location of lesions  On formation of CAD  On symptoms of heart disease  And on procedure performed  Stressed compliance with antiplatelet medications   Patient has been on Plavix in the past and is compliant       Discussed risk factor identification and medication  Risk factors include hypertension  Hypothyroidism  Hyperlipidemia  Diabetes  Sleep apnea  Explained the relationship between heart disease and diabetes  Patient is a non smoker   Teaching done on cardiac diet  Patient states she has received teaching in the past and did follow diet in the past but gradually slipped into old habits   Explained the benefits of regular exercise program      Offered benefits of out patient cardiac rehab  Patient is agreeable she has been in the past and would like to go again  Her  has had CABG twice and she would like him to go as well  Explained he can participate if his cardiologist would write a Rx  Stressed regular follow up with cardiologist  An appointment has been made with Dr Richard Colindres for May 8  Given card with time date and office phone number       Given literature on diabetes and heart disease and on post PCI care  Support given  Voiced understanding to all information provided

## 2019-05-01 NOTE — PROGRESS NOTES
CLINICAL PHARMACY NOTE: MEDS TO [address] Select Patient?: No  Total # of Prescriptions Filled: 1   The following medications were delivered to the patient:  · Clopidogrel 75mg  Total # of Interventions Completed: 0  Time Spent (min): 15    Additional Documentation:

## 2019-05-01 NOTE — DISCHARGE INSTR - ACTIVITY
1. No driving X 3 days, can ride with someone else. 2. No heavy weight lifting  3. F/U in 7 to 10 days  4.  No smoking; if applicable

## 2019-05-02 LAB
EKG ATRIAL RATE: 86 BPM
EKG DIAGNOSIS: NORMAL
EKG P AXIS: 32 DEGREES
EKG P-R INTERVAL: 178 MS
EKG Q-T INTERVAL: 372 MS
EKG QRS DURATION: 92 MS
EKG QTC CALCULATION (BAZETT): 445 MS
EKG R AXIS: -27 DEGREES
EKG T AXIS: 0 DEGREES
EKG VENTRICULAR RATE: 86 BPM

## 2019-05-08 ENCOUNTER — OFFICE VISIT (OUTPATIENT)
Dept: CARDIOLOGY CLINIC | Age: 69
End: 2019-05-08
Payer: MEDICARE

## 2019-05-08 VITALS
HEIGHT: 63 IN | SYSTOLIC BLOOD PRESSURE: 130 MMHG | HEART RATE: 101 BPM | BODY MASS INDEX: 41.96 KG/M2 | DIASTOLIC BLOOD PRESSURE: 82 MMHG | WEIGHT: 236.8 LBS

## 2019-05-08 DIAGNOSIS — R06.02 SHORTNESS OF BREATH: ICD-10-CM

## 2019-05-08 PROCEDURE — 1036F TOBACCO NON-USER: CPT | Performed by: INTERNAL MEDICINE

## 2019-05-08 PROCEDURE — G8399 PT W/DXA RESULTS DOCUMENT: HCPCS | Performed by: INTERNAL MEDICINE

## 2019-05-08 PROCEDURE — G8427 DOCREV CUR MEDS BY ELIG CLIN: HCPCS | Performed by: INTERNAL MEDICINE

## 2019-05-08 PROCEDURE — 1123F ACP DISCUSS/DSCN MKR DOCD: CPT | Performed by: INTERNAL MEDICINE

## 2019-05-08 PROCEDURE — 99214 OFFICE O/P EST MOD 30 MIN: CPT | Performed by: INTERNAL MEDICINE

## 2019-05-08 PROCEDURE — 93000 ELECTROCARDIOGRAM COMPLETE: CPT | Performed by: INTERNAL MEDICINE

## 2019-05-08 PROCEDURE — G8417 CALC BMI ABV UP PARAM F/U: HCPCS | Performed by: INTERNAL MEDICINE

## 2019-05-08 PROCEDURE — G8598 ASA/ANTIPLAT THER USED: HCPCS | Performed by: INTERNAL MEDICINE

## 2019-05-08 PROCEDURE — 3017F COLORECTAL CA SCREEN DOC REV: CPT | Performed by: INTERNAL MEDICINE

## 2019-05-08 PROCEDURE — 4040F PNEUMOC VAC/ADMIN/RCVD: CPT | Performed by: INTERNAL MEDICINE

## 2019-05-08 PROCEDURE — 1090F PRES/ABSN URINE INCON ASSESS: CPT | Performed by: INTERNAL MEDICINE

## 2019-05-08 RX ORDER — RANOLAZINE 500 MG/1
500 TABLET, EXTENDED RELEASE ORAL 2 TIMES DAILY
Qty: 60 TABLET | Refills: 3 | Status: SHIPPED | OUTPATIENT
Start: 2019-05-08 | End: 2019-06-26

## 2019-05-08 NOTE — PROGRESS NOTES
Ryan Wayne MD        OFFICE  FOLLOWUP NOTE    Chief complaints: patient is here for management of CAD, HTN, DYSLPIDEMIA, DM,leg swelling      Subjective: patient feels better,however, still has some chest pressure and dyspnea      HPI Shakeel Asif is a 77 y. o.year old who  has a past medical history of Acid reflux, Angina at rest [facility]), Arthritis, Asthma, Broken teeth, CAD (coronary artery disease), Chronic back pain, Degenerative joint disease, Diabetes mellitus ([facility] [address] GI bleed, H/O Doppler venous ultrasound, H/O echocardiogram, H/O percutaneous transluminal coronary angioplasty, Hiatal hernia, History of complete ECG, History of echocardiogram, History of nuclear stress test, History of nuclear stress test, Hx of 24 hour EKG monitoring, Hx of cardiovascular stress test, Hx of CT scan, Hx of Doppler ultrasound, Hx of echocardiogram, HX OTHER MEDICAL, Hyperlipidemia, Hypertension, Hyperthyroidism, Hypothyroidism, adult, Kidney stones, Left hip pain, Migraines, Normal cardiac stress test, ORLIN (obstructive sleep apnea), Post PTCA, RECEIVED BLOOD TRANSFUSION, Shortness of breath on exertion, Sleep apnea, Teeth missing, Ulcer, Urinary incontinence, UTI (urinary tract infection), Venous US Dup, Wears glasses, and Wears partial dentures.  and presents for management of CAD, HTN, DYSLPIDEMIA, DM,leg swelling  which are well controlled      Current Outpatient Medications   Medication Sig Dispense Refill    metFORMIN (GLUCOPHAGE) 500 MG tablet TK 2 T PO QAM AND 3 T PO QPM WITH MEALS 60 tablet 11    clopidogrel (PLAVIX) 75 MG tablet Take 1 tablet by mouth daily 30 tablet 3    OXYGEN Inhale into the lungs      quinapril (ACCUPRIL) 40 MG tablet Take 1 tablet by mouth nightly 90 tablet 3    carvedilol (COREG) 12.5 MG tablet Take 1 tablet by mouth 2 times daily (with meals) 1180 tablet 3    atorvastatin (LIPITOR) 20 MG tablet Take 1 tablet by mouth nightly 90 tablet 3    hydrochlorothiazide (HYDRODIURIL)  RECEIVED BLOOD TRANSFUSION     RECEIVED BLOOD TRANSFUSION, NO REACTION TO THE BLOOD TRANSFUSION RECEIVED    Shortness of breath on exertion     Sleep apnea     NO CPAP- last sleep study done     Teeth missing     Upper And Lower    Ulcer     Stomach, GI Bleeding, Received Blood Transfusions    Urinary incontinence     \"wear a pad all the time\"    UTI (urinary tract infection) In Past     No Current Symptoms    Venous US Dup     Wears glasses     Wears partial dentures     Upper     Past Surgical History:   Procedure Laterality Date    CARPAL TUNNEL RELEASE Bilateral  Right     1989 Left     SECTION  ,     Tubal Ligation Also Done In  With     CHOLECYSTECTOMY      COLONOSCOPY  Last Done In     COLONOSCOPY  3/23/15    Internal hemorrhoids    COLONOSCOPY  04/10/2019    COLONOSCOPY N/A 4/10/2019    COLONOSCOPY POLYPECTOMY SNARE/COLD BIOPSY performed by Kassidy Canales MD at [place] [address]  2010    balloon angioplasty of distal mid LAD;     CORONARY ANGIOPLASTY  2008    LAD stent 2.75x16 taxus    CORONARY ANGIOPLASTY  2008    2. 5x8 taxus stent to the ostium of the circ.  DENTAL SURGERY      Teeth Extracted In Past    DIAGNOSTIC CARDIAC CATH LAB PROCEDURE  2010    balloon angioplasty of distal mid LAD;     DIAGNOSTIC CARDIAC CATH LAB PROCEDURE  2008    LAD stent 2.75x16 taxus    DIAGNOSTIC CARDIAC CATH LAB PROCEDURE  2008    2. 5x8 taxus stent to the ostium of the circ.     ENDOSCOPY, COLON, DIAGNOSTIC  \"Several\"    ENDOSCOPY, COLON, DIAGNOSTIC  3/23/15    small hiatal hernia, anatomy consistent with banding, gastritis    FINGER TRIGGER RELEASE  10-09 \"One On Each Hand Trigger Finger Release\"     \"Left Middle Finger Trigger Finger Release\"    HYSTERECTOMY  1982    Partial Abdominal Hysterectomy, \"Pinned The Bladder Up\"    JOINT REPLACEMENT Right     Total Right Hip    JOINT REPLACEMENT Right     Total Right Knee    JOINT REPLACEMENT Left     Total Left Knee    JOINT REPLACEMENT Left     Revision Total Left Knee    KNEE SURGERY Right ,     KNEE SURGERY Left     Left Knee Bone Graft    KNEE SURGERY Right     Right Knee Bone Graft    OTHER SURGICAL HISTORY      \"Stomach Stapling\" Pre Surgery Weight Was 230 lbs    OTHER SURGICAL HISTORY Left 04/10/2017    left hip steroid injection    PTCA  16    Stenting of the LAD.     SHOULDER ARTHROSCOPY Right 2-10    SHOULDER ARTHROSCOPY Left 09/10/14    TONSILLECTOMY AND ADENOIDECTOMY  1964    TUBAL LIGATION      Done With     WISDOM TOOTH EXTRACTION      All Four [place] Teeth Extracted In Past     Family History   Problem Relation Age of Onset    Cancer Mother         Liver Cancer    Heart Disease Mother     High Blood Pressure Mother     Asthma Mother     Cancer Father         Colon Cancer    Heart Disease Father     High Blood Pressure Father     Colon Cancer Father     Arthritis Sister     Early Death Sister 52    High Blood Pressure Sister     Other Sister         \"Breathing Problem\"    Heart Disease Son         Open Heart Surgery    Diabetes Son     High Blood Pressure Son     Mental Illness Daughter         Bipolar    Early Death Sister 61        Liver Cancer    Cancer Sister         Liver Cancer    Stomach Cancer Neg Hx      Social History     Tobacco Use    Smoking status: Former Smoker     Packs/day: 0.25     Years: 38.00     Pack years: 9.50     Types: Cigarettes     Start date: 1962     Last attempt to quit: 2000     Years since quittin.6    Smokeless tobacco: Never Used    Tobacco comment: \"never a heavy smoker just occ smoker\"   Substance Use Topics    Alcohol use: No      [unfilled]  Review of Systems:   · Constitutional: No Fever or Weight Loss   · Eyes: No Decreased Vision  · ENT: No Headaches, Hearing Loss or Vertigo  · Cardiovascular: No chest pain, dyspnea on exertion, palpitations or loss of consciousness  · Respiratory: No cough or wheezing    · Gastrointestinal: No abdominal pain, appetite loss, blood in stools, constipation, diarrhea or heartburn  · Genitourinary: No dysuria, trouble voiding, or hematuria  · Musculoskeletal:  No gait disturbance, weakness or joint complaints  · Integumentary: No rash or pruritis  · Neurological: No TIA or stroke symptoms  · Psychiatric: No anxiety or depression  · Endocrine: No malaise, fatigue or temperature intolerance  · Hematologic/Lymphatic: No bleeding problems, blood clots or swollen lymph nodes  · Allergic/Immunologic: No nasal congestion or hives  All systems negative except as marked. Objective:  /82   Pulse 101   Ht 5' 3\" (1.6 m)   Wt 236 lb 12.8 oz (107.4 kg)   BMI 41.95 kg/m²   Wt Readings from Last 3 Encounters:   05/08/19 236 lb 12.8 oz (107.4 kg)   04/30/19 240 lb (108.9 kg)   04/10/19 237 lb (107.5 kg)     Body mass index is 41.95 kg/m². GENERAL - Alert, oriented, pleasant, in no apparent distress,normal grooming  HEENT - pupils are reactive to light and accomodation, cornea intact, conjunctive normal color, ears are normal in exam,throat exam in normal, teeth, gum and palate are normal, oral mucosa is normal without any notation of pallor or cyanosis  Neck - Supple. No jugular venous distention noted. No carotid bruits, no apical -carotid delay  Respiratory:  Normal breath sounds, No respiratory distress, No wheezing, No chest tenderness. ,no use of accessory muscles, diaphragm movement is normal  Cardiovascular: (PMI) apex non displaced,no lifts no thrills, no s3,no s4, Normal heart rate, Normal rhythm, No murmurs, No rubs, No gallops.  Carotid arteries pulse and amplitude are normal no bruit, no abdominal bruit noted ( normal abdominal aorta ausculation), femoral arteries pulse and amplitude are normal no bruit, pedal pulses are normal  Femoral pulses have normal amplitude, no bruits   Extremities - No cyanosis, clubbing, or significant edema, no varicose veins    Abdomen - No masses, tenderness, or organomegaly, no hepato-splenomegally, no bruits  Musculoskeletal - No significant edema, no kyphosis or scoliosis, no deformity in any extremity noted, muscle strength and tone are normal  Skin: no ulcer,no scar,no stasis dermatitis   Neurologic - alert oriented times 3,Cranial nerves II through XII are grossly intact. There were no gross focal neurologic abnormalities. All sensory and motor nerves examined and were normal  Psychiatric: normal mood and affect    Lab Results   Component Value Date    CKTOTAL 164 07/05/2014    CKMB 1.4 12/20/2011    TROPONINI <0.006 01/23/2014    TROPONINI <0.006 12/21/2011     BNP:    Lab Results   Component Value Date    BNP 33 01/23/2014     PT/INR:  No results found for: PTINR  Lab Results   Component Value Date    LABA1C 7.3 (H) 06/16/2017    LABA1C 7.4 (H) 03/08/2017     Lab Results   Component Value Date    CHOL 117 06/16/2017    TRIG 96 06/16/2017    HDL 56 06/16/2017    LDLDIRECT 53 06/16/2017     Lab Results   Component Value Date    ALT 28 04/04/2019    AST 29 04/04/2019     TSH:  No results found for: TSH    Impression:  Hipolito Deluna is a 77 y. o.year old who  has a past medical history of Acid reflux, Angina at rest [facility]), Arthritis, Asthma, Broken teeth, CAD (coronary artery disease), Chronic back pain, Degenerative joint disease, Diabetes mellitus ([address] GI bleed, H/O Doppler venous ultrasound, H/O echocardiogram, H/O percutaneous transluminal coronary angioplasty, Hiatal hernia, History of complete ECG, History of echocardiogram, History of nuclear stress test, History of nuclear stress test, Hx of 24 hour EKG monitoring, Hx of cardiovascular stress test, Hx of CT scan, Hx of Doppler ultrasound, Hx of echocardiogram, HX OTHER MEDICAL, Hyperlipidemia, Hypertension, Hyperthyroidism, Hypothyroidism, adult, Kidney stones, Left hip pain, Migraines, Normal cardiac stress test, ORLIN (obstructive sleep apnea), Post PTCA, RECEIVED BLOOD TRANSFUSION, Shortness of breath on exertion, Sleep apnea, Teeth missing, Ulcer, Urinary incontinence, UTI (urinary tract infection), Venous US Dup, Wears glasses, and Wears partial dentures. and presents with     Plan:  1. CAD AND s/p PCI of LAD and PTCA of instent stent restenosis Continue aspirin, BB, ace inhibitor and statis, add ranexa, will get stress test and cardiac rehab  2. Leg swelling:venous doppler is normal, recommend compression socks  3. DM: stable continue metformin    4. Dyslipidemia: continue statins  5. HTN: stable, continue coreg and lisinopril       All labs, medications and tests reviewed, continue all other medications of all above medical condition listed as is.     [unfilled]

## 2019-05-13 ENCOUNTER — TELEPHONE (OUTPATIENT)
Dept: CARDIOLOGY CLINIC | Age: 69
End: 2019-05-13

## 2019-05-14 ENCOUNTER — TELEPHONE (OUTPATIENT)
Dept: CARDIOLOGY CLINIC | Age: 69
End: 2019-05-14

## 2019-05-14 RX ORDER — RANOLAZINE 500 MG/1
500 TABLET, EXTENDED RELEASE ORAL 2 TIMES DAILY
Qty: 42 TABLET | Refills: 0 | COMMUNITY
Start: 2019-05-14 | End: 2019-06-25 | Stop reason: SDUPTHER

## 2019-06-25 ENCOUNTER — TELEPHONE (OUTPATIENT)
Dept: CARDIOLOGY CLINIC | Age: 69
End: 2019-06-25

## 2019-06-25 NOTE — TELEPHONE ENCOUNTER
Patient has been having a issue with Vertigo when she lies down , call to [facility]

## 2019-06-26 RX ORDER — RANOLAZINE 500 MG/1
500 TABLET, EXTENDED RELEASE ORAL 2 TIMES DAILY
Qty: 180 TABLET | Refills: 2 | Status: SHIPPED | OUTPATIENT
Start: 2019-06-26 | End: 2020-03-12 | Stop reason: SDUPTHER

## 2019-07-10 ENCOUNTER — HOSPITAL ENCOUNTER (OUTPATIENT)
Dept: CARDIAC REHAB | Age: 69
Setting detail: THERAPIES SERIES
Discharge: HOME | End: 2019-07-10
Payer: MEDICARE

## 2019-07-10 PROCEDURE — 93798 PHYS/QHP OP CAR RHAB W/ECG: CPT

## 2019-10-01 ENCOUNTER — OFFICE VISIT (OUTPATIENT)
Dept: ORTHOPEDIC SURGERY | Age: 69
End: 2019-10-01
Payer: MEDICARE

## 2019-10-01 VITALS — WEIGHT: 237.2 LBS | RESPIRATION RATE: 14 BRPM | BODY MASS INDEX: 42.03 KG/M2 | HEIGHT: 63 IN

## 2019-10-01 DIAGNOSIS — S93.492A SPRAIN OF ANTERIOR TALOFIBULAR LIGAMENT OF LEFT ANKLE, INITIAL ENCOUNTER: ICD-10-CM

## 2019-10-01 DIAGNOSIS — S92.352A CLOSED FRACTURE OF BASE OF FIFTH METATARSAL BONE OF LEFT FOOT, INITIAL ENCOUNTER: Primary | ICD-10-CM

## 2019-10-01 PROCEDURE — G8417 CALC BMI ABV UP PARAM F/U: HCPCS | Performed by: PHYSICIAN ASSISTANT

## 2019-10-01 PROCEDURE — 99212 OFFICE O/P EST SF 10 MIN: CPT | Performed by: PHYSICIAN ASSISTANT

## 2019-10-01 PROCEDURE — G8427 DOCREV CUR MEDS BY ELIG CLIN: HCPCS | Performed by: PHYSICIAN ASSISTANT

## 2019-10-01 PROCEDURE — 1090F PRES/ABSN URINE INCON ASSESS: CPT | Performed by: PHYSICIAN ASSISTANT

## 2019-10-01 PROCEDURE — G8484 FLU IMMUNIZE NO ADMIN: HCPCS | Performed by: PHYSICIAN ASSISTANT

## 2019-10-01 ASSESSMENT — ENCOUNTER SYMPTOMS
EYES NEGATIVE: 1
RESPIRATORY NEGATIVE: 1
GASTROINTESTINAL NEGATIVE: 1

## 2019-10-16 LAB
CHOLESTEROL, TOTAL: 129 MG/DL
CHOLESTEROL/HDL RATIO: NORMAL
HDLC SERPL-MCNC: 49 MG/DL (ref 35–70)
LDL CHOLESTEROL CALCULATED: 42 MG/DL (ref 0–160)
TRIGL SERPL-MCNC: 191 MG/DL
VLDLC SERPL CALC-MCNC: 38 MG/DL

## 2019-10-28 ENCOUNTER — OFFICE VISIT (OUTPATIENT)
Dept: ORTHOPEDIC SURGERY | Age: 69
End: 2019-10-28
Payer: MEDICARE

## 2019-10-28 VITALS
WEIGHT: 235.2 LBS | BODY MASS INDEX: 41.67 KG/M2 | RESPIRATION RATE: 14 BRPM | HEIGHT: 63 IN | OXYGEN SATURATION: 95 % | HEART RATE: 104 BPM

## 2019-10-28 DIAGNOSIS — S63.501A SPRAIN OF RIGHT WRIST, INITIAL ENCOUNTER: Primary | ICD-10-CM

## 2019-10-28 DIAGNOSIS — M25.531 ACUTE PAIN OF RIGHT WRIST: ICD-10-CM

## 2019-10-28 PROCEDURE — G8598 ASA/ANTIPLAT THER USED: HCPCS | Performed by: PHYSICIAN ASSISTANT

## 2019-10-28 PROCEDURE — G8484 FLU IMMUNIZE NO ADMIN: HCPCS | Performed by: PHYSICIAN ASSISTANT

## 2019-10-28 PROCEDURE — 1036F TOBACCO NON-USER: CPT | Performed by: PHYSICIAN ASSISTANT

## 2019-10-28 PROCEDURE — 1123F ACP DISCUSS/DSCN MKR DOCD: CPT | Performed by: PHYSICIAN ASSISTANT

## 2019-10-28 PROCEDURE — 3017F COLORECTAL CA SCREEN DOC REV: CPT | Performed by: PHYSICIAN ASSISTANT

## 2019-10-28 PROCEDURE — G8427 DOCREV CUR MEDS BY ELIG CLIN: HCPCS | Performed by: PHYSICIAN ASSISTANT

## 2019-10-28 PROCEDURE — 99213 OFFICE O/P EST LOW 20 MIN: CPT | Performed by: PHYSICIAN ASSISTANT

## 2019-10-28 PROCEDURE — G8399 PT W/DXA RESULTS DOCUMENT: HCPCS | Performed by: PHYSICIAN ASSISTANT

## 2019-10-28 PROCEDURE — 4040F PNEUMOC VAC/ADMIN/RCVD: CPT | Performed by: PHYSICIAN ASSISTANT

## 2019-10-28 PROCEDURE — 1090F PRES/ABSN URINE INCON ASSESS: CPT | Performed by: PHYSICIAN ASSISTANT

## 2019-10-28 PROCEDURE — G8417 CALC BMI ABV UP PARAM F/U: HCPCS | Performed by: PHYSICIAN ASSISTANT

## 2019-11-01 ASSESSMENT — ENCOUNTER SYMPTOMS
GASTROINTESTINAL NEGATIVE: 1
RESPIRATORY NEGATIVE: 1
EYES NEGATIVE: 1

## 2019-11-14 ENCOUNTER — OFFICE VISIT (OUTPATIENT)
Dept: CARDIOLOGY CLINIC | Age: 69
End: 2019-11-14
Payer: MEDICARE

## 2019-11-14 VITALS
HEART RATE: 84 BPM | SYSTOLIC BLOOD PRESSURE: 138 MMHG | WEIGHT: 238.4 LBS | HEIGHT: 63 IN | BODY MASS INDEX: 42.24 KG/M2 | DIASTOLIC BLOOD PRESSURE: 80 MMHG

## 2019-11-14 DIAGNOSIS — I25.10 CORONARY ARTERY DISEASE INVOLVING NATIVE CORONARY ARTERY OF NATIVE HEART WITHOUT ANGINA PECTORIS: Primary | ICD-10-CM

## 2019-11-14 DIAGNOSIS — R06.02 SHORTNESS OF BREATH: ICD-10-CM

## 2019-11-14 PROCEDURE — 4040F PNEUMOC VAC/ADMIN/RCVD: CPT | Performed by: INTERNAL MEDICINE

## 2019-11-14 PROCEDURE — G8484 FLU IMMUNIZE NO ADMIN: HCPCS | Performed by: INTERNAL MEDICINE

## 2019-11-14 PROCEDURE — 1090F PRES/ABSN URINE INCON ASSESS: CPT | Performed by: INTERNAL MEDICINE

## 2019-11-14 PROCEDURE — G8427 DOCREV CUR MEDS BY ELIG CLIN: HCPCS | Performed by: INTERNAL MEDICINE

## 2019-11-14 PROCEDURE — 3017F COLORECTAL CA SCREEN DOC REV: CPT | Performed by: INTERNAL MEDICINE

## 2019-11-14 PROCEDURE — 1036F TOBACCO NON-USER: CPT | Performed by: INTERNAL MEDICINE

## 2019-11-14 PROCEDURE — G8598 ASA/ANTIPLAT THER USED: HCPCS | Performed by: INTERNAL MEDICINE

## 2019-11-14 PROCEDURE — G8417 CALC BMI ABV UP PARAM F/U: HCPCS | Performed by: INTERNAL MEDICINE

## 2019-11-14 PROCEDURE — G8399 PT W/DXA RESULTS DOCUMENT: HCPCS | Performed by: INTERNAL MEDICINE

## 2019-11-14 PROCEDURE — 99214 OFFICE O/P EST MOD 30 MIN: CPT | Performed by: INTERNAL MEDICINE

## 2019-11-14 PROCEDURE — 1123F ACP DISCUSS/DSCN MKR DOCD: CPT | Performed by: INTERNAL MEDICINE

## 2019-11-20 ENCOUNTER — TELEPHONE (OUTPATIENT)
Dept: CARDIOLOGY CLINIC | Age: 69
End: 2019-11-20

## 2019-11-22 ENCOUNTER — PROCEDURE VISIT (OUTPATIENT)
Dept: CARDIOLOGY CLINIC | Age: 69
End: 2019-11-22
Payer: MEDICARE

## 2019-11-22 DIAGNOSIS — R06.02 SHORTNESS OF BREATH: Primary | ICD-10-CM

## 2019-11-22 LAB
LV EF: 58 %
LVEF MODALITY: NORMAL

## 2019-11-22 PROCEDURE — 93306 TTE W/DOPPLER COMPLETE: CPT | Performed by: INTERNAL MEDICINE

## 2019-11-25 ENCOUNTER — TELEPHONE (OUTPATIENT)
Dept: CARDIOLOGY CLINIC | Age: 69
End: 2019-11-25

## 2020-01-14 ENCOUNTER — OFFICE VISIT (OUTPATIENT)
Dept: PULMONOLOGY | Age: 70
End: 2020-01-14
Payer: MEDICARE

## 2020-01-14 VITALS
HEIGHT: 63 IN | WEIGHT: 234.8 LBS | SYSTOLIC BLOOD PRESSURE: 152 MMHG | OXYGEN SATURATION: 95 % | HEART RATE: 104 BPM | DIASTOLIC BLOOD PRESSURE: 90 MMHG | BODY MASS INDEX: 41.6 KG/M2

## 2020-01-14 PROCEDURE — 99213 OFFICE O/P EST LOW 20 MIN: CPT | Performed by: NURSE PRACTITIONER

## 2020-01-14 PROCEDURE — 1123F ACP DISCUSS/DSCN MKR DOCD: CPT | Performed by: NURSE PRACTITIONER

## 2020-01-14 PROCEDURE — G8417 CALC BMI ABV UP PARAM F/U: HCPCS | Performed by: NURSE PRACTITIONER

## 2020-01-14 PROCEDURE — G8399 PT W/DXA RESULTS DOCUMENT: HCPCS | Performed by: NURSE PRACTITIONER

## 2020-01-14 PROCEDURE — 1036F TOBACCO NON-USER: CPT | Performed by: NURSE PRACTITIONER

## 2020-01-14 PROCEDURE — G8484 FLU IMMUNIZE NO ADMIN: HCPCS | Performed by: NURSE PRACTITIONER

## 2020-01-14 PROCEDURE — 3017F COLORECTAL CA SCREEN DOC REV: CPT | Performed by: NURSE PRACTITIONER

## 2020-01-14 PROCEDURE — 1090F PRES/ABSN URINE INCON ASSESS: CPT | Performed by: NURSE PRACTITIONER

## 2020-01-14 PROCEDURE — G8427 DOCREV CUR MEDS BY ELIG CLIN: HCPCS | Performed by: NURSE PRACTITIONER

## 2020-01-14 PROCEDURE — 4040F PNEUMOC VAC/ADMIN/RCVD: CPT | Performed by: NURSE PRACTITIONER

## 2020-01-14 RX ORDER — ALBUTEROL SULFATE 90 UG/1
2 AEROSOL, METERED RESPIRATORY (INHALATION) EVERY 6 HOURS PRN
Qty: 3 INHALER | Refills: 3 | Status: SHIPPED | OUTPATIENT
Start: 2020-01-14 | End: 2021-02-17 | Stop reason: SDUPTHER

## 2020-01-14 NOTE — PROGRESS NOTES
Office Note    SUBJECTIVE:  Chief Complaint: Shortness of breath, hypoxia    Braydon Mclaughlin is a 68 y.o. female with history of PAH, CAD status post PTCA, HLD, HTN, DM type II, hypothyroidism, dyspnea on exertion, bronchitis, hypoxia, presents today to the pulmonary clinic for follow up and continued oxygen use. Jessie Vanrset states she wears her oxygen all night and with activity at home and away from home when she is able to take her large heavy unit with her. She states she does not have the small portable concentrator which she feels if she had she would be more able to wear her oxygen when she is away from the house. O2 sat is noted 96% in room air ar rest, however with activity of ambulating in the hallway at 4 minutes her oxygen sat was noted down to 86% in room air and her heart rate had increased to 140 so testing was stopped. She does not have her home oxygen with her again stating because the unit is so large she is unable to manage it outside of her home. She was placed on 2 L nasal cannula and O2 sat did recover to 96% with activity and at 2 minute rest time. She continues to demonstrate need of oxygen with activity, but not at rest, she also based on her most recent sleep study does need nocturnal oxygen. She states she is concerned that she may end up getting a upper respiratory infection or cold as her  is right now home with cold type symptoms. She denies any fever or chills. She denies any productive cough at this time. She states she does have the dry cough that we have seen her earlier for but she states that is improved. She states she still does wake up at night between 1 and 2 AM with cough.      She has recently completed a cardiology work-up including 2D echocardiogram, which was normal.     Influenza 9/19/2018  Pneumovax has had both Prevnar 13 and 23 but unsure of dates  PCP Marybeth Beltran MD    Past Medical History:  Past Medical History:   Diagnosis Date    Acid reflux     Angina at rest [facility]) In Past    Denies Chest Pain At This Time    Arthritis     \"Knees, Back And Hips\"    Asthma     Broken teeth     Upper    CAD (coronary artery disease)     Sees Dr. Terrie Ochoa a couple of heart stents- had stress test in May 2018 all ok\"    Chronic back pain     Degenerative joint disease     Back     Diabetes mellitus ([address] Dx 2003    GI bleed 11-09    H/O Doppler venous ultrasound 02/11/2019    No significant insufficiency noted.  H/O echocardiogram 07/14/2014    EF 50%, low normal systolic function, no wall motion abnormalities, mild concentric hypertrophy and signs of diastolic dysfunction, mildly dilated left atrium,  no pericardial effusion, mild mitral and tricuspid regurg.  H/O echocardiogram 11/2219    EF 55-60%,  There is physiological to trace amount of pericardial fluid present.  H/O percutaneous transluminal coronary angioplasty 04/30/2019    LAD stent and PIC of restent stenosis also in LAD.  Hiatal hernia     History of complete ECG     07/20/2012=NSR, leftward axis, prob normal for age, poor r-waveprogression, inferior ST-T abnormalities, consider ischemia    History of echocardiogram 01/26/2017    Ejection fraction is visually estimated at 55%. Mild concentric left ventricular hypertrophy. Mildly dilated left atrium. Mild mitral regurgitation is present.  History of nuclear stress test 01/26/2017    cardiolite-moderate ischemia mid anteroseptal,EF70%    History of nuclear stress test 07/12/2018    Normal    Hx of 24 hour EKG monitoring 12/21/2011    brief run of SVT, otherwise noth clinically sig.  arrhythmia noted    Hx of cardiovascular stress test 7/25/14 1/23/12 7/14 EF70% normal study 1/23/12=thallium stress=no scintigraphic inducible myocardial ischemia, EF 70%;  03/2011=EF 70%; 03/2010=EF 70%; 12/1997; 02/1997    Hx of CT scan     05/08/2011=CT chest with contrast=examination is 3    OXYGEN, Inhale into the lungs, Disp: , Rfl:     quinapril (ACCUPRIL) 40 MG tablet, Take 1 tablet by mouth nightly, Disp: 90 tablet, Rfl: 3    carvedilol (COREG) 12.5 MG tablet, Take 1 tablet by mouth 2 times daily (with meals), Disp: 1180 tablet, Rfl: 3    atorvastatin (LIPITOR) 20 MG tablet, Take 1 tablet by mouth nightly, Disp: 90 tablet, Rfl: 3    hydrochlorothiazide (HYDRODIURIL) 25 MG tablet, Take 1 tablet by mouth daily Takes 12.5 daily 1/2 of a 25 mg tablet, Disp: 90 tablet, Rfl: 3    B Complex Vitamins (VITAMIN B COMPLEX PO), Take by mouth every morning , Disp: , Rfl:     vitamin D (CHOLECALCIFEROL) 1000 UNIT TABS tablet, Take 1,000 Units by mouth daily, Disp: , Rfl:     ondansetron (ZOFRAN) 4 MG tablet, Take 1 tablet by mouth every 8 hours as needed for Nausea or Vomiting, Disp: 30 tablet, Rfl: 3    HYDROcodone-acetaminophen (NORCO) 5-325 MG per tablet, Take 1 tablet by mouth every 6 hours as needed , Disp: , Rfl: 0    aspirin 81 MG tablet, Take 81 mg by mouth every morning. Last Dose Taken 9-2-14 Due To Scheduled Surgery, Disp: , Rfl:     glipiZIDE (GLUCOTROL) 5 MG tablet, Take 5 mg by mouth 2 times daily (before meals).   , Disp: , Rfl:     levothyroxine (SYNTHROID) 150 MCG tablet, Take 150 mcg by mouth nightly., Disp: , Rfl:     meclizine (ANTIVERT) 25 MG tablet, TK 1 T PO  Q 4 H PRF  DIZZINESS, Disp: , Rfl: 3    ranitidine (ZANTAC) 300 MG tablet, TAKE 1 TABLET BY MOUTH EVERY NIGHT (Patient not taking: Reported on 1/14/2020), Disp: 30 tablet, Rfl: 5    Allergies   Allergen Reactions    Codeine Nausea Only       Social History:    Social History     Socioeconomic History    Marital status:      Spouse name: None    Number of children: None    Years of education: None    Highest education level: None   Occupational History    None   Social Needs    Financial resource strain: None    Food insecurity:     Worry: None     Inability: None    Transportation needs:     Medical: None     Non-medical: None   Tobacco Use    Smoking status: Former Smoker     Packs/day: 0.25     Years: 38.00     Pack years: 9.50     Types: Cigarettes     Start date: 1962     Last attempt to quit: 2000     Years since quittin.3    Smokeless tobacco: Never Used    Tobacco comment: \"never a heavy smoker just occ smoker\"   Substance and Sexual Activity    Alcohol use: No    Drug use: No     Comment: \"When I Quit I Was Smoking About 2 Two Packs Of Cigarettes A Week\"    Sexual activity: Yes     Partners: Male   Lifestyle    Physical activity:     Days per week: None     Minutes per session: None    Stress: None   Relationships    Social connections:     Talks on phone: None     Gets together: None     Attends [employer] service: None     Active member of club or organization: None     Attends meetings of clubs or organizations: None     Relationship status: None    Intimate partner violence:     Fear of current or ex partner: None     Emotionally abused: None     Physically abused: None     Forced sexual activity: None   Other Topics Concern    None   Social History Narrative    None       Family History:    Family History   Problem Relation Age of Onset    Cancer Mother         Liver Cancer    Heart Disease Mother     High Blood Pressure Mother     Asthma Mother     Cancer Father         Colon Cancer    Heart Disease Father     High Blood Pressure Father     Colon Cancer Father     Arthritis Sister     Early Death Sister 52    High Blood Pressure Sister     Other Sister         \"Breathing Problem\"    Heart Disease Son         Open Heart Surgery    Diabetes Son     High Blood Pressure Son     Mental Illness Daughter         Bipolar    Early Death Sister 61        Liver Cancer    Cancer Sister         Liver Cancer    Stomach Cancer Neg Hx          REVIEW OF SYSTEMS:    CONSTITUTIONAL:  negative for fevers, chills, diaphoresis, activity change, appetite change, night sweats response to increase in FEF 25-70%     Sleep Study:  3/11/2019 AHI 4.2, min sat on 2 LPM 90%, she was noted to have irregular ECG with PAC's during testing     Radiology Review:  All pertinent images / reports were reviewed as a part of this visit. 4/29/2019 CXR  No acute process, right elevated hemidiaphragm. No consolidations, effusions or pneumothorax. Stable cardiomediastinal silhouette, scarring right lower lobe.    2/8/2019  CT chest high resolution - unremarkable CT of chest, no appreciable interstitial lung disease or suspicious pulmonary nodule.     01/23/2019  CXR - heart size and pulmonary vessels stable. Chronic elevation of the right diaphragm with right basilar atelectasis. No acute infiltration. Calcification granuloma in     Echocardiogram complete 2D with Doppler with color 11/22/2019  LV function and size normal, ejection fraction 55 to 60%, normal left wall ventricle thickness, no regional wall motion abnormalities, diastolic dysfunction grade 1, mild dilation left atrium, RVSP 24mmHg, no significant valvular abnormalities    ASSESSMENT:    1. SOBOE (shortness of breath on exertion)    2. Nocturnal hypoxia    3. Obstructive sleep apnea    4. Cough    5. Morbid obesity with BMI of 40.0-44.9, adult [facility])          PLAN:  Jodi Diehl continues to need her supplemental oxygen with activity as noted by activity in the office today and at nighttime. She states when she is wearing her oxygen her short of breath is significantly improved. She states often when she is away from home she does not take her oxygen with her due to the size and difficulty with maneuvering the large unit she has been given to her DME. We will order testing through [facility] to see if she can get a small portable tank which will allow her to be more compliant with her oxygen use when she is away from home. She is scheduled for repeat PFT testing in March of this year.   She is requesting a refill of her albuterol rescue inhaler which she uses as needed and states that she uses twice a day. She was diagnosed with obstructive sleep apnea in the past and was on CPAP but was unable to wear more than 6 hours at a time secondary to the fitting of the mask. We did repeat a sleep study which demonstrated an AHI of 4.2 events per hour, she did wear her nocturnal oxygen during the test and showed a minimum desaturation to 90% while sleeping, irregular EKG with PACs was noted. We have spoken and I have reminded her of the need to improve her sleep hygiene measures, avoid sleeping on her back, driving precautions, weight loss and exercise, continue wearing home oxygen at night while sleeping. We have discussed her need to continue to lose weight, her weight has actually increased since my initial visit with her when her initial weight was 234 pounds. We have discussed overall health consequences with excess weight including but not limited to cardiovascular, cardiopulmonary, liver disease, etc.  She states she has just too much stress in her life especially with her son's recent stroke for her to be able to concentrate on working on a diet at this time. She states she knows her whole family needs to eat more healthy but she just does not have the time or energy at this point to work on weight loss. Return in about 6 months (around 7/14/2020). This dictation was performed with a verbal recognition program and it was checked for errors. It is possible that there are still dictated errors within this office note. Any errors should be brought immediately to my attention for correction. All efforts were made to ensure that this office note is accurate.

## 2020-03-02 ENCOUNTER — HOSPITAL ENCOUNTER (OUTPATIENT)
Dept: PULMONOLOGY | Age: 70
Discharge: HOME | End: 2020-03-02
Payer: MEDICARE

## 2020-03-02 LAB
DLCO %PRED: 58 %
DLCO PRED: NORMAL
DLCO/VA %PRED: NORMAL
DLCO/VA PRED: NORMAL
DLCO/VA: NORMAL
DLCO: NORMAL
EXPIRATORY TIME-POST: NORMAL
EXPIRATORY TIME: NORMAL
FEF 25-75% %CHNG: NORMAL
FEF 25-75% %PRED-POST: NORMAL
FEF 25-75% %PRED-PRE: NORMAL
FEF 25-75% PRED: NORMAL
FEF 25-75%-POST: NORMAL
FEF 25-75%-PRE: NORMAL
FEV1 %PRED-POST: 82 %
FEV1 %PRED-PRE: 77 %
FEV1 PRED: NORMAL
FEV1-POST: NORMAL
FEV1-PRE: NORMAL
FEV1/FVC %PRED-POST: NORMAL
FEV1/FVC %PRED-PRE: NORMAL
FEV1/FVC PRED: NORMAL
FEV1/FVC-POST: 85 %
FEV1/FVC-PRE: 85 %
FVC %PRED-POST: NORMAL
FVC %PRED-PRE: NORMAL
FVC PRED: NORMAL
FVC-POST: NORMAL
FVC-PRE: NORMAL
GAW %PRED: NORMAL
GAW PRED: NORMAL
GAW: NORMAL
IC %PRED: NORMAL
IC PRED: NORMAL
IC: NORMAL
MEP: NORMAL
MIP: NORMAL
MVV %PRED-PRE: NORMAL
MVV PRED: NORMAL
MVV-PRE: NORMAL
PEF %PRED-POST: NORMAL
PEF %PRED-PRE: NORMAL
PEF PRED: NORMAL
PEF%CHNG: NORMAL
PEF-POST: NORMAL
PEF-PRE: NORMAL
RAW %PRED: NORMAL
RAW PRED: NORMAL
RAW: NORMAL
RV %PRED: NORMAL
RV PRED: NORMAL
RV: NORMAL
SVC %PRED: NORMAL
SVC PRED: NORMAL
SVC: NORMAL
TLC %PRED: 110 %
TLC PRED: NORMAL
TLC: NORMAL
VA %PRED: NORMAL
VA PRED: NORMAL
VA: NORMAL
VTG %PRED: NORMAL
VTG PRED: NORMAL
VTG: NORMAL

## 2020-03-02 PROCEDURE — 94729 DIFFUSING CAPACITY: CPT

## 2020-03-02 PROCEDURE — 94010 BREATHING CAPACITY TEST: CPT

## 2020-03-02 PROCEDURE — 94727 GAS DIL/WSHOT DETER LNG VOL: CPT

## 2020-03-02 PROCEDURE — 36600 WITHDRAWAL OF ARTERIAL BLOOD: CPT

## 2020-03-02 ASSESSMENT — PULMONARY FUNCTION TESTS
FEV1_PERCENT_PREDICTED_PRE: 77
FEV1/FVC_POST: 85
FEV1_PERCENT_PREDICTED_POST: 82
FEV1/FVC_PRE: 85

## 2020-03-12 RX ORDER — RANOLAZINE 500 MG/1
500 TABLET, EXTENDED RELEASE ORAL 2 TIMES DAILY
Qty: 60 TABLET | Refills: 3 | Status: SHIPPED | OUTPATIENT
Start: 2020-03-12 | End: 2020-06-02 | Stop reason: ALTCHOICE

## 2020-06-02 ENCOUNTER — HOSPITAL ENCOUNTER (OUTPATIENT)
Dept: INFUSION THERAPY | Age: 70
Setting detail: INFUSION SERIES
Discharge: HOME | End: 2020-06-02
Payer: MEDICARE

## 2020-06-02 VITALS
HEART RATE: 78 BPM | HEIGHT: 62 IN | TEMPERATURE: 96.6 F | SYSTOLIC BLOOD PRESSURE: 144 MMHG | BODY MASS INDEX: 42.51 KG/M2 | DIASTOLIC BLOOD PRESSURE: 78 MMHG | OXYGEN SATURATION: 96 % | RESPIRATION RATE: 16 BRPM | WEIGHT: 231 LBS

## 2020-06-02 PROCEDURE — 6360000002 HC RX W HCPCS: Performed by: FAMILY MEDICINE

## 2020-06-02 PROCEDURE — 2580000003 HC RX 258: Performed by: FAMILY MEDICINE

## 2020-06-02 PROCEDURE — 96365 THER/PROPH/DIAG IV INF INIT: CPT

## 2020-06-02 PROCEDURE — 99211 OFF/OP EST MAY X REQ PHY/QHP: CPT

## 2020-06-02 RX ORDER — 0.9 % SODIUM CHLORIDE 0.9 %
10 VIAL (ML) INJECTION PRN
Status: DISCONTINUED | OUTPATIENT
Start: 2020-06-02 | End: 2020-06-03 | Stop reason: HOSPADM

## 2020-06-02 RX ORDER — ZOLEDRONIC ACID 5 MG/100ML
5 INJECTION, SOLUTION INTRAVENOUS ONCE
Status: COMPLETED | OUTPATIENT
Start: 2020-06-02 | End: 2020-06-02

## 2020-06-02 RX ORDER — PANTOPRAZOLE SODIUM 40 MG/1
40 TABLET, DELAYED RELEASE ORAL DAILY
COMMUNITY

## 2020-06-02 RX ADMIN — ZOLEDRONIC ACID 5 MG: 5 INJECTION, SOLUTION INTRAVENOUS at 10:35

## 2020-06-02 RX ADMIN — SODIUM CHLORIDE, PRESERVATIVE FREE 10 ML: 5 INJECTION INTRAVENOUS at 11:00

## 2020-06-02 RX ADMIN — SODIUM CHLORIDE, PRESERVATIVE FREE 10 ML: 5 INJECTION INTRAVENOUS at 10:35

## 2020-06-02 NOTE — DISCHARGE SUMMARY
Tolerated Reclast well. Reviewed discharge instructions, understanding verbalized. Copies of AVS given to take home. Patient discharged home . Down to exit per self.     Orders Placed This Encounter   Medications    zoledronic acid (RECLAST) 5 mg/100 mL infusion    sodium chloride (PF) 0.9 % injection 10 mL

## 2020-06-02 NOTE — PLAN OF CARE
Ambulatory to unit room 3 for RECLAST. Orientated to unit. Procedure and plan of care explained. Questions answered. Understanding verbalized.

## 2020-06-08 ENCOUNTER — HOSPITAL ENCOUNTER (OUTPATIENT)
Age: 70
Discharge: HOME | End: 2020-06-08
Payer: MEDICARE

## 2020-06-08 ENCOUNTER — HOSPITAL ENCOUNTER (OUTPATIENT)
Dept: GENERAL RADIOLOGY | Age: 70
Discharge: HOME | End: 2020-06-08
Payer: MEDICARE

## 2020-06-08 PROCEDURE — 74018 RADEX ABDOMEN 1 VIEW: CPT

## 2020-08-03 NOTE — PROGRESS NOTES
Chest Pain At This Time    Arthritis     \"Knees, Back And Hips\"    Asthma     Broken teeth     Upper    CAD (coronary artery disease)     Sees Dr. Estefani Sheridan a couple of heart stents- had stress test in May 2018 all ok\"    Chronic back pain     Degenerative joint disease     Back     Diabetes mellitus ([address] Dx 2003    GI bleed 11-09    H/O Doppler venous ultrasound 02/11/2019    No significant insufficiency noted.  H/O echocardiogram 07/14/2014    EF 50%, low normal systolic function, no wall motion abnormalities, mild concentric hypertrophy and signs of diastolic dysfunction, mildly dilated left atrium,  no pericardial effusion, mild mitral and tricuspid regurg.  H/O echocardiogram 11/2219    EF 55-60%,  There is physiological to trace amount of pericardial fluid present.  H/O percutaneous transluminal coronary angioplasty 04/30/2019    LAD stent and PIC of restent stenosis also in LAD.  Hiatal hernia     History of complete ECG     07/20/2012=NSR, leftward axis, prob normal for age, poor r-waveprogression, inferior ST-T abnormalities, consider ischemia    History of echocardiogram 01/26/2017    Ejection fraction is visually estimated at 55%. Mild concentric left ventricular hypertrophy. Mildly dilated left atrium. Mild mitral regurgitation is present.  History of nuclear stress test 01/26/2017    cardiolite-moderate ischemia mid anteroseptal,EF70%    History of nuclear stress test 07/12/2018    Normal    Hx of 24 hour EKG monitoring 12/21/2011    brief run of SVT, otherwise noth clinically sig. arrhythmia noted    Hx of cardiovascular stress test 7/25/14 1/23/12 7/14 EF70% normal study 1/23/12=thallium stress=no scintigraphic inducible myocardial ischemia, EF 70%;  03/2011=EF 70%; 03/2010=EF 70%; 12/1997; 02/1997    Hx of CT scan     05/08/2011=CT chest with contrast=examination is moderately degraded by respiratory motion. No evidence for acute pulmonary thromboembolic disease. Dilated main pulmonary artery suggesting elevated pulm artery pressures    Hx of Doppler ultrasound     bilateral carotid 03/11/2011= no significant stenosis present    Hx of echocardiogram     03/11/2011=mildly hypertreophied left ventricle, EF 60%, mild pulm. htn, mild aortic stenosis; 02/1997    HX OTHER MEDICAL     Primary Care Physician Is Dr. Christian King Hyperlipidemia     Hypertension     Hyperthyroidism     Hypothyroidism, adult     Kidney stones Last Episode In 2000's    \"Passed Kidney Stones Three Different Times\"    Left hip pain     \"for months- had hip pain\"for steroid injection 7/5/2016    Migraines     Normal cardiac stress test 05/12/16    EF70% Normal    ORLIN (obstructive sleep apnea)     doesnt use cpap, uses o2 2l as needed at night    Post PTCA     2008, 2010, 2016    RECEIVED BLOOD TRANSFUSION 11-09    RECEIVED BLOOD TRANSFUSION, NO REACTION TO THE BLOOD TRANSFUSION RECEIVED    Shortness of breath on exertion     Sleep apnea     NO CPAP- last sleep study done 2011    Teeth missing     Upper And Lower    Ulcer 11-09    Stomach, GI Bleeding, Received Blood Transfusions    Urinary incontinence     \"wear a pad all the time\"    UTI (urinary tract infection) In Past     No Current Symptoms    Venous US Dup     Wears glasses     Wears partial dentures     Upper       Current Medications:      Current Outpatient Medications:     pantoprazole (PROTONIX) 40 MG tablet, Take 40 mg by mouth daily, Disp: , Rfl:     metFORMIN (GLUCOPHAGE) 500 MG tablet, TK 2 T PO QAM AND 3 T PO QPM WITH MEALS, Disp: 60 tablet, Rfl: 11    clopidogrel (PLAVIX) 75 MG tablet, Take 1 tablet by mouth daily, Disp: 30 tablet, Rfl: 3    OXYGEN, Inhale into the lungs, Disp: , Rfl:     quinapril (ACCUPRIL) 40 MG tablet, Take 1 tablet by mouth nightly, Disp: 90 tablet, Rfl: 3    carvedilol (COREG) 12.5 MG tablet, Take 1 tablet by mouth 2 times daily (with meals) (Patient taking differently: Take 6.25 mg by mouth 2 times daily (with meals) ), Disp: 1180 tablet, Rfl: 3    atorvastatin (LIPITOR) 20 MG tablet, Take 1 tablet by mouth nightly, Disp: 90 tablet, Rfl: 3    hydrochlorothiazide (HYDRODIURIL) 25 MG tablet, Take 1 tablet by mouth daily Takes 12.5 daily 1/2 of a 25 mg tablet, Disp: 90 tablet, Rfl: 3    meclizine (ANTIVERT) 25 MG tablet, TK 1 T PO  Q 4 H PRF  DIZZINESS, Disp: , Rfl: 3    B Complex Vitamins (VITAMIN B COMPLEX PO), Take by mouth every morning , Disp: , Rfl:     vitamin D (CHOLECALCIFEROL) 1000 UNIT TABS tablet, Take 1,000 Units by mouth daily, Disp: , Rfl:     ondansetron (ZOFRAN) 4 MG tablet, Take 1 tablet by mouth every 8 hours as needed for Nausea or Vomiting, Disp: 30 tablet, Rfl: 3    HYDROcodone-acetaminophen (NORCO) 5-325 MG per tablet, Take 1 tablet by mouth every 6 hours as needed , Disp: , Rfl: 0    aspirin 81 MG tablet, Take 81 mg by mouth every morning. Last Dose Taken 9-2-14 Due To Scheduled Surgery, Disp: , Rfl:     glipiZIDE (GLUCOTROL) 5 MG tablet, Take 5 mg by mouth 2 times daily (before meals).   , Disp: , Rfl:     levothyroxine (SYNTHROID) 150 MCG tablet, Take 150 mcg by mouth nightly., Disp: , Rfl:     albuterol sulfate  (90 Base) MCG/ACT inhaler, Inhale 2 puffs into the lungs every 6 hours as needed for Wheezing, Disp: 3 Inhaler, Rfl: 3    Allergies   Allergen Reactions    Codeine Nausea Only       Social History:    Social History     Socioeconomic History    Marital status:      Spouse name: None    Number of children: None    Years of education: None    Highest education level: None   Occupational History    None   Social Needs    Financial resource strain: None    Food insecurity     Worry: None     Inability: None    Transportation needs     Medical: None     Non-medical: None   Tobacco Use    Smoking status: Former Smoker     Packs/day: 0.25     Years: 38.00     Pack years: 9.50     Types: Cigarettes     Start date: 9/5/1962     Last attempt to quit: 2000     Years since quittin.9    Smokeless tobacco: Never Used    Tobacco comment: \"never a heavy smoker just occ smoker\"   Substance and Sexual Activity    Alcohol use: No    Drug use: No     Comment: \"When I Quit I Was Smoking About 2 Two Packs Of Cigarettes A Week\"    Sexual activity: Yes     Partners: Male   Lifestyle    Physical activity     Days per week: None     Minutes per session: None    Stress: None   Relationships    Social connections     Talks on phone: None     Gets together: None     Attends [employer] service: None     Active member of club or organization: None     Attends meetings of clubs or organizations: None     Relationship status: None    Intimate partner violence     Fear of current or ex partner: None     Emotionally abused: None     Physically abused: None     Forced sexual activity: None   Other Topics Concern    None   Social History Narrative    None       Family History:    Family History   Problem Relation Age of Onset    Cancer Mother         Liver Cancer    Heart Disease Mother     High Blood Pressure Mother     Asthma Mother     Cancer Father         Colon Cancer    Heart Disease Father     High Blood Pressure Father     Colon Cancer Father     Arthritis Sister     Early Death Sister 52    High Blood Pressure Sister     Other Sister         \"Breathing Problem\"    Heart Disease Son         Open Heart Surgery    Diabetes Son     High Blood Pressure Son     Mental Illness Daughter         Bipolar    Early Death Sister 61        Liver Cancer    Cancer Sister         Liver Cancer    Stomach Cancer Neg Hx          REVIEW OF SYSTEMS:    CONSTITUTIONAL:  negative for fevers, chills, diaphoresis, activity change, appetite change, night sweats and unexpected weight change.    HEENT:  negative for hearing loss, sinus pressure, nasal congestion, epistaxis and snoring  RESPIRATORY: Negative for shortness of breath, negative for cough, negative for wheeze  CARDIOVASCULAR:  Negative for chest pain, palpitations, exertional chest pressure/discomfort, edema, syncope  GASTROINTESTINAL: negative for nausea, vomiting, diarrhea, constipation, blood in stool and abdominal pain  GENITOURINARY:  negative for frequency, dysuria and hematuria  HEMATOLOGIC/LYMPHATIC:  negative for easy bruising, bleeding and lymphadenopathy  ALLERGIC/IMMUNOLOGIC:  negative for recurrent infections, angioedema, anaphylaxis and drug reaction  MUSCULOSKELETAL:  negative for pain, joint swelling, decreased range of motion and muscle weakness  NEURO: negative for headache, AMS, decrease sensations  SKIN: Negative for rashes or lesions      Physical Exam:  /64   Pulse 54   Ht 5' 2\" (1.575 m)   Wt 230 lb (104.3 kg)   SpO2 98%   BMI 42.07 kg/m²    Body mass index is 42.07 kg/m². Constitutional:  She appears well developed and well-nourished. Neck:  Supple, no palpable lymphadenopathy, no JVD  Cardiovascular:  S1, S2 Normal, Regular rhythm, no murmurs or gallops, no pericardial  rubs. Pulmonary: Bilateral breath sounds clear and equal to auscultation, good air movement, no use of accessory muscles to support respiratory effort. No forced expiratory wheeze, no rales, no rhonchi. No cough noted during assessment  Abdomen: No epigastric pain, no distention, + bowel sounds   Extremities: No edema, no calf tenderness, no clubbing of digits  Neurologic:  Awake and alert, no focal deficits  Skin: warm and dry    Radiology: She has had no new chest x-rays or chest CTs since last seen      PFT: 3/2/2020  FVC 70% predicted, FEV1 77% predicted, FEV1/% predicted, FEF 25-75% 106% predicted, , , DLCO 58  Following administration of bronchodilator there was no response to bronchodilator. She shows slight decrease in pulmonary function from her last pulmonary function test that was completed 3/29/2019 with decrease is noted in FVC and FEV1. ASSESSMENT:    1.  Dyspnea on exertion    2. Nocturnal hypoxia    3. Preop testing    4. Healthcare maintenance        PLAN:   She states she does still continue to have some dyspnea with exertion if she is not wearing her oxygen. She is wearing her oxygen at night as recommended at her sleep study. She continues on her Symbicort and albuterol rescue inhaler, I will make no changes at this time. We will repeat her pulmonary function test next March, she is aware that she may need to have a COVID pre-test done prior to that testing. I have encouraged her in the need to wear her oxygen especially when she is away from the home. She states she does have her oxygen with her and it is in the car but she was leery about bringing it in today to the office secondary to the coronavirus. I have assured her that the office is taking proper techniques to protect our patients. We have discussed the need to maintain yearly flu immunization, pneumococcal vaccination. We have discussed Coronavirus precaution including: social distancing when needing to be in public, handwashing practice, wiping items touched in public such as gas pumps, door handles, shopping carts, etc. Self monitoring for infection - fever, chills, cough, SOB. Should they develop symptoms they should call office for further instructions. Return in about 6 months (around 2/4/2021) for PFT. This dictation was performed with a verbal recognition program and it was checked for errors. It is possible that there are still dictated errors within this office note. Any errors should be brought immediately to my attention for correction. All efforts were made to ensure that this office note is accurate.

## 2020-08-04 ENCOUNTER — OFFICE VISIT (OUTPATIENT)
Dept: PULMONOLOGY | Age: 70
End: 2020-08-04
Payer: MEDICARE

## 2020-08-04 VITALS
BODY MASS INDEX: 42.33 KG/M2 | HEIGHT: 62 IN | SYSTOLIC BLOOD PRESSURE: 128 MMHG | WEIGHT: 230 LBS | DIASTOLIC BLOOD PRESSURE: 64 MMHG | HEART RATE: 54 BPM | OXYGEN SATURATION: 98 %

## 2020-08-04 PROCEDURE — 4040F PNEUMOC VAC/ADMIN/RCVD: CPT | Performed by: NURSE PRACTITIONER

## 2020-08-04 PROCEDURE — 1090F PRES/ABSN URINE INCON ASSESS: CPT | Performed by: NURSE PRACTITIONER

## 2020-08-04 PROCEDURE — G8427 DOCREV CUR MEDS BY ELIG CLIN: HCPCS | Performed by: NURSE PRACTITIONER

## 2020-08-04 PROCEDURE — G8399 PT W/DXA RESULTS DOCUMENT: HCPCS | Performed by: NURSE PRACTITIONER

## 2020-08-04 PROCEDURE — 1036F TOBACCO NON-USER: CPT | Performed by: NURSE PRACTITIONER

## 2020-08-04 PROCEDURE — G8417 CALC BMI ABV UP PARAM F/U: HCPCS | Performed by: NURSE PRACTITIONER

## 2020-08-04 PROCEDURE — 99213 OFFICE O/P EST LOW 20 MIN: CPT | Performed by: NURSE PRACTITIONER

## 2020-08-04 PROCEDURE — 3017F COLORECTAL CA SCREEN DOC REV: CPT | Performed by: NURSE PRACTITIONER

## 2020-08-04 PROCEDURE — 1123F ACP DISCUSS/DSCN MKR DOCD: CPT | Performed by: NURSE PRACTITIONER

## 2020-08-14 ENCOUNTER — HOSPITAL ENCOUNTER (OUTPATIENT)
Dept: WOMENS IMAGING | Age: 70
Discharge: HOME | End: 2020-08-14
Payer: MEDICARE

## 2020-08-14 PROCEDURE — 77063 BREAST TOMOSYNTHESIS BI: CPT

## 2020-08-19 RX ORDER — BUDESONIDE AND FORMOTEROL FUMARATE DIHYDRATE 160; 4.5 UG/1; UG/1
2 AEROSOL RESPIRATORY (INHALATION) 2 TIMES DAILY
Qty: 1 INHALER | Refills: 5 | Status: SHIPPED | OUTPATIENT
Start: 2020-08-19 | End: 2021-02-17 | Stop reason: SDUPTHER

## 2020-08-19 RX ORDER — BUDESONIDE AND FORMOTEROL FUMARATE DIHYDRATE 160; 4.5 UG/1; UG/1
2 AEROSOL RESPIRATORY (INHALATION) 2 TIMES DAILY
COMMUNITY
Start: 2020-08-08 | End: 2020-08-19 | Stop reason: SDUPTHER

## 2020-08-19 NOTE — TELEPHONE ENCOUNTER
Patient states that the Symbicort 160 is helping and that she feels like it \"opens her up more\". Please send script to pharmacy.

## 2020-09-02 ENCOUNTER — TELEPHONE (OUTPATIENT)
Dept: CARDIOLOGY CLINIC | Age: 70
End: 2020-09-02

## 2020-09-03 RX ORDER — RANOLAZINE 500 MG/1
TABLET, EXTENDED RELEASE ORAL
Qty: 60 TABLET | Refills: 3 | OUTPATIENT
Start: 2020-09-03

## 2020-09-04 RX ORDER — RANOLAZINE 500 MG/1
500 TABLET, EXTENDED RELEASE ORAL 2 TIMES DAILY
Qty: 60 TABLET | Refills: 5 | Status: SHIPPED | OUTPATIENT
Start: 2020-09-04

## 2020-09-11 ENCOUNTER — HOSPITAL ENCOUNTER (OUTPATIENT)
Dept: MRI IMAGING | Age: 70
Discharge: HOME | End: 2020-09-11
Payer: MEDICARE

## 2020-09-11 PROCEDURE — 72148 MRI LUMBAR SPINE W/O DYE: CPT

## 2020-10-09 ENCOUNTER — OFFICE VISIT (OUTPATIENT)
Dept: CARDIOLOGY CLINIC | Age: 70
End: 2020-10-09
Payer: MEDICARE

## 2020-10-09 VITALS
HEART RATE: 80 BPM | HEIGHT: 62 IN | WEIGHT: 229.6 LBS | DIASTOLIC BLOOD PRESSURE: 80 MMHG | SYSTOLIC BLOOD PRESSURE: 118 MMHG | BODY MASS INDEX: 42.25 KG/M2

## 2020-10-09 PROCEDURE — 99214 OFFICE O/P EST MOD 30 MIN: CPT | Performed by: INTERNAL MEDICINE

## 2020-10-09 PROCEDURE — 1123F ACP DISCUSS/DSCN MKR DOCD: CPT | Performed by: INTERNAL MEDICINE

## 2020-10-09 PROCEDURE — 93000 ELECTROCARDIOGRAM COMPLETE: CPT | Performed by: INTERNAL MEDICINE

## 2020-10-09 PROCEDURE — 3017F COLORECTAL CA SCREEN DOC REV: CPT | Performed by: INTERNAL MEDICINE

## 2020-10-09 PROCEDURE — G8399 PT W/DXA RESULTS DOCUMENT: HCPCS | Performed by: INTERNAL MEDICINE

## 2020-10-09 PROCEDURE — 1090F PRES/ABSN URINE INCON ASSESS: CPT | Performed by: INTERNAL MEDICINE

## 2020-10-09 PROCEDURE — G8484 FLU IMMUNIZE NO ADMIN: HCPCS | Performed by: INTERNAL MEDICINE

## 2020-10-09 PROCEDURE — 4040F PNEUMOC VAC/ADMIN/RCVD: CPT | Performed by: INTERNAL MEDICINE

## 2020-10-09 PROCEDURE — G8427 DOCREV CUR MEDS BY ELIG CLIN: HCPCS | Performed by: INTERNAL MEDICINE

## 2020-10-09 PROCEDURE — G8417 CALC BMI ABV UP PARAM F/U: HCPCS | Performed by: INTERNAL MEDICINE

## 2020-10-09 PROCEDURE — 1036F TOBACCO NON-USER: CPT | Performed by: INTERNAL MEDICINE

## 2020-10-09 NOTE — PROGRESS NOTES
Meche Rojas MD        OFFICE  FOLLOWUP NOTE    Chief complaints: patient is here for management of CAD, HTN, DYSLPIDEMIA, DM,leg swelling, sleep apnea  Subjective: patient feels rt breast area pain    HPI Bisi Iyer is a 68 y. o.year old who  has a past medical history of Acid reflux, Angina at rest [facility]), Arthritis, Asthma, Broken teeth, CAD (coronary artery disease), Chronic back pain, Degenerative joint disease, Diabetes mellitus ([facility] [address] GI bleed, H/O Doppler venous ultrasound, H/O echocardiogram, H/O echocardiogram, H/O percutaneous transluminal coronary angioplasty, Hiatal hernia, History of complete ECG, History of echocardiogram, History of nuclear stress test, History of nuclear stress test, Hx of 24 hour EKG monitoring, Hx of cardiovascular stress test, Hx of CT scan, Hx of Doppler ultrasound, Hx of echocardiogram, HX OTHER MEDICAL, Hyperlipidemia, Hypertension, Hyperthyroidism, Hypothyroidism, adult, Kidney stones, Left hip pain, Migraines, Normal cardiac stress test, ORLIN (obstructive sleep apnea), Post PTCA, RECEIVED BLOOD TRANSFUSION, Shortness of breath on exertion, Sleep apnea, Teeth missing, Ulcer, Urinary incontinence, UTI (urinary tract infection), Venous US Dup, Wears glasses, and Wears partial dentures. and presents for management of CAD, HTN, DYSLPIDEMIA, DM,leg swelling, sleep apnea which are well controlled    Patient has been using oxygen at night.   Current Outpatient Medications   Medication Sig Dispense Refill    Ferrous Sulfate (IRON) 28 MG TABS Take by mouth daily      ranolazine (RANEXA) 500 MG extended release tablet Take 1 tablet by mouth 2 times daily 60 tablet 5    SYMBICORT 160-4.5 MCG/ACT AERO Inhale 2 puffs into the lungs 2 times daily 1 Inhaler 5    pantoprazole (PROTONIX) 40 MG tablet Take 40 mg by mouth daily      albuterol sulfate  (90 Base) MCG/ACT inhaler Inhale 2 puffs into the lungs every 6 hours as needed for Wheezing 3 Inhaler 3    metFORMIN (GLUCOPHAGE) 500 MG tablet TK 2 T PO QAM AND 3 T PO QPM WITH MEALS 60 tablet 11    clopidogrel (PLAVIX) 75 MG tablet Take 1 tablet by mouth daily 30 tablet 3    OXYGEN Inhale into the lungs      quinapril (ACCUPRIL) 40 MG tablet Take 1 tablet by mouth nightly 90 tablet 3    carvedilol (COREG) 12.5 MG tablet Take 1 tablet by mouth 2 times daily (with meals) (Patient taking differently: Take 6.25 mg by mouth 2 times daily (with meals) ) 1180 tablet 3    atorvastatin (LIPITOR) 20 MG tablet Take 1 tablet by mouth nightly 90 tablet 3    hydrochlorothiazide (HYDRODIURIL) 25 MG tablet Take 1 tablet by mouth daily Takes 12.5 daily 1/2 of a 25 mg tablet 90 tablet 3    meclizine (ANTIVERT) 25 MG tablet TK 1 T PO  Q 4 H PRF  DIZZINESS  3    B Complex Vitamins (VITAMIN B COMPLEX PO) Take by mouth every morning       vitamin D (CHOLECALCIFEROL) 1000 UNIT TABS tablet Take 1,000 Units by mouth daily      ondansetron (ZOFRAN) 4 MG tablet Take 1 tablet by mouth every 8 hours as needed for Nausea or Vomiting 30 tablet 3    HYDROcodone-acetaminophen (NORCO) 5-325 MG per tablet Take 1 tablet by mouth every 6 hours as needed   0    aspirin 81 MG tablet Take 81 mg by mouth every morning. Last Dose Taken 9-2-14 Due To Scheduled Surgery      glipiZIDE (GLUCOTROL) 5 MG tablet Take 5 mg by mouth 2 times daily (before meals).  levothyroxine (SYNTHROID) 150 MCG tablet Take 150 mcg by mouth nightly. No current facility-administered medications for this visit.       Allergies: Codeine  Past Medical History:   Diagnosis Date    Acid reflux     Angina at rest [facility]) In Past    Denies Chest Pain At This Time    Arthritis     \"Knees, Back And Hips\"    Asthma     Broken teeth     Upper    CAD (coronary artery disease)     Sees Dr. Goyo Santos a couple of heart stents- had stress test in May 2018 all ok\"    Chronic back pain     Degenerative joint disease     Back     Diabetes mellitus ([facility] [address] Dx 2003    GI bleed 11-09  H/O Doppler venous ultrasound 02/11/2019    No significant insufficiency noted.  H/O echocardiogram 07/14/2014    EF 50%, low normal systolic function, no wall motion abnormalities, mild concentric hypertrophy and signs of diastolic dysfunction, mildly dilated left atrium,  no pericardial effusion, mild mitral and tricuspid regurg.  H/O echocardiogram 11/2219    EF 55-60%,  There is physiological to trace amount of pericardial fluid present.  H/O percutaneous transluminal coronary angioplasty 04/30/2019    LAD stent and PIC of restent stenosis also in LAD.  Hiatal hernia     History of complete ECG     07/20/2012=NSR, leftward axis, prob normal for age, poor r-waveprogression, inferior ST-T abnormalities, consider ischemia    History of echocardiogram 01/26/2017    Ejection fraction is visually estimated at 55%. Mild concentric left ventricular hypertrophy. Mildly dilated left atrium. Mild mitral regurgitation is present.  History of nuclear stress test 01/26/2017    cardiolite-moderate ischemia mid anteroseptal,EF70%    History of nuclear stress test 07/12/2018    Normal    Hx of 24 hour EKG monitoring 12/21/2011    brief run of SVT, otherwise noth clinically sig. arrhythmia noted    Hx of cardiovascular stress test 7/25/14 1/23/12 7/14 EF70% normal study 1/23/12=thallium stress=no scintigraphic inducible myocardial ischemia, EF 70%;  03/2011=EF 70%; 03/2010=EF 70%; 12/1997; 02/1997    Hx of CT scan     05/08/2011=CT chest with contrast=examination is moderately degraded by respiratory motion. No evidence for acute pulmonary thromboembolic disease.  Dilated main pulmonary artery suggesting elevated pulm artery pressures    Hx of Doppler ultrasound     bilateral carotid 03/11/2011= no significant stenosis present    Hx of echocardiogram     03/11/2011=mildly hypertreophied left ventricle, EF 60%, mild pulm. htn, mild aortic stenosis; 02/1997    [facility]     Primary Care Physician Is Dr. Lety Sommers Hyperlipidemia     Hypertension     Hyperthyroidism     Hypothyroidism, adult     Kidney stones Last Episode In     \"Passed Kidney Stones Three Different Times\"    Left hip pain     \"for months- had hip pain\"for steroid injection 2016    Migraines     Normal cardiac stress test 16    EF70% Normal    ORLIN (obstructive sleep apnea)     doesnt use cpap, uses o2 2l as needed at night    Post PTCA     , ,     RECEIVED BLOOD TRANSFUSION     RECEIVED BLOOD TRANSFUSION, NO REACTION TO THE BLOOD TRANSFUSION RECEIVED    Shortness of breath on exertion     Sleep apnea     NO CPAP- last sleep study done     Teeth missing     Upper And Lower    Ulcer     Stomach, GI Bleeding, Received Blood Transfusions    Urinary incontinence     \"wear a pad all the time\"    UTI (urinary tract infection) In Past     No Current Symptoms    Venous US Dup     Wears glasses     Wears partial dentures     Upper     Past Surgical History:   Procedure Laterality Date    CARPAL TUNNEL RELEASE Bilateral  Right      Left     SECTION  1975    Tubal Ligation Also Done In  With     CHOLECYSTECTOMY      COLONOSCOPY  Last Done In     COLONOSCOPY  3/23/15    Internal hemorrhoids    COLONOSCOPY  04/10/2019    COLONOSCOPY N/A 4/10/2019    COLONOSCOPY POLYPECTOMY SNARE/COLD BIOPSY performed by Nhan Silva MD at [place] [address]  2010    balloon angioplasty of distal mid LAD;     CORONARY ANGIOPLASTY  2008    LAD stent 2.75x16 taxus    CORONARY ANGIOPLASTY  2008    2. 5x8 taxus stent to the ostium of the circ.  DENTAL SURGERY      Teeth Extracted In Past    DIAGNOSTIC CARDIAC CATH LAB PROCEDURE  2010    balloon angioplasty of distal mid LAD;     DIAGNOSTIC CARDIAC CATH LAB PROCEDURE  2008    LAD stent 2.75x16 taxus    DIAGNOSTIC CARDIAC CATH LAB PROCEDURE  2008    2. 5x8 0.25     Years: 38.00     Pack years: 9.50     Types: Cigarettes     Start date: 1962     Last attempt to quit: 2000     Years since quittin.1    Smokeless tobacco: Never Used    Tobacco comment: \"never a heavy smoker just occ smoker\"   Substance Use Topics    Alcohol use: No      [unfilled]  Review of Systems:   · Constitutional: No Fever or Weight Loss   · Eyes: No Decreased Vision  · ENT: No Headaches, Hearing Loss or Vertigo  · Cardiovascular: No chest pain, dyspnea on exertion, palpitations or loss of consciousness  · Respiratory: No cough or wheezing    · Gastrointestinal: No abdominal pain, appetite loss, blood in stools, constipation, diarrhea or heartburn  · Genitourinary: No dysuria, trouble voiding, or hematuria  · Musculoskeletal:  No gait disturbance, weakness or joint complaints  · Integumentary: No rash or pruritis  · Neurological: No TIA or stroke symptoms  · Psychiatric: No anxiety or depression  · Endocrine: No malaise, fatigue or temperature intolerance  · Hematologic/Lymphatic: No bleeding problems, blood clots or swollen lymph nodes  · Allergic/Immunologic: No nasal congestion or hives  All systems negative except as marked. Objective:  /80 (Site: Left Upper Arm, Position: Sitting, Cuff Size: Large Adult)   Pulse 80   Ht 5' 2\" (1.575 m)   Wt 229 lb 9.6 oz (104.1 kg)   BMI 41.99 kg/m²   Wt Readings from Last 3 Encounters:   10/09/20 229 lb 9.6 oz (104.1 kg)   20 230 lb (104.3 kg)   20 231 lb (104.8 kg)     Body mass index is 41.99 kg/m². GENERAL - Alert, oriented, pleasant, in no apparent distress,normal grooming  HEENT - pupils are reactive to light and accomodation, cornea intact, conjunctive normal color, ears are normal in exam,throat exam in normal, teeth, gum and palate are normal, oral mucosa is normal without any notation of pallor or cyanosis  Neck - Supple. No jugular venous distention noted.  No carotid bruits, no apical -carotid delay  Respiratory:  Normal breath sounds, No respiratory distress, No wheezing, No chest tenderness. ,no use of accessory muscles, diaphragm movement is normal  Cardiovascular: (PMI) apex non displaced,no lifts no thrills, no s3,no s4, Normal heart rate, Normal rhythm, No murmurs, No rubs, No gallops. Carotid arteries pulse and amplitude are normal no bruit, no abdominal bruit noted ( normal abdominal aorta ausculation), femoral arteries pulse and amplitude are normal no bruit, pedal pulses are normal  Femoral pulses have normal amplitude, no bruits   Extremities - No cyanosis, clubbing, or significant edema, no varicose veins    Abdomen - No masses, tenderness, or organomegaly, no hepato-splenomegally, no bruits  Musculoskeletal - No significant edema, no kyphosis or scoliosis, no deformity in any extremity noted, muscle strength and tone are normal  Skin: no ulcer,no scar,no stasis dermatitis   Neurologic - alert oriented times 3,Cranial nerves II through XII are grossly intact. There were no gross focal neurologic abnormalities. All sensory and motor nerves examined and were normal  Psychiatric: normal mood and affect    Lab Results   Component Value Date    CKTOTAL 164 07/05/2014    CKMB 1.4 12/20/2011    TROPONINI <0.006 01/23/2014    TROPONINI <0.006 12/21/2011     BNP:    Lab Results   Component Value Date    BNP 33 01/23/2014     PT/INR:  No results found for: PTINR  Lab Results   Component Value Date    LABA1C 7.3 (H) 06/16/2017    LABA1C 7.4 (H) 03/08/2017     Lab Results   Component Value Date    CHOL 129 10/16/2019    TRIG 191 10/16/2019    HDL 49 10/16/2019    LDLCALC 42 10/16/2019    LDLDIRECT 53 06/16/2017     Lab Results   Component Value Date    ALT 28 04/04/2019    AST 29 04/04/2019     TSH:  No results found for: TSH    Impression:  Nolvia Up is a 68 y. o.year old who  has a past medical history of Acid reflux, Angina at rest [facility]), Arthritis, Asthma, Broken teeth, CAD (coronary artery disease), Chronic back pain, Degenerative joint disease, Diabetes mellitus ([facility] [address] GI bleed, H/O Doppler venous ultrasound, H/O echocardiogram, H/O echocardiogram, H/O percutaneous transluminal coronary angioplasty, Hiatal hernia, History of complete ECG, History of echocardiogram, History of nuclear stress test, History of nuclear stress test, Hx of 24 hour EKG monitoring, Hx of cardiovascular stress test, Hx of CT scan, Hx of Doppler ultrasound, Hx of echocardiogram, HX OTHER MEDICAL, Hyperlipidemia, Hypertension, Hyperthyroidism, Hypothyroidism, adult, Kidney stones, Left hip pain, Migraines, Normal cardiac stress test, ORLIN (obstructive sleep apnea), Post PTCA, RECEIVED BLOOD TRANSFUSION, Shortness of breath on exertion, Sleep apnea, Teeth missing, Ulcer, Urinary incontinence, UTI (urinary tract infection), Venous US Dup, Wears glasses, and Wears partial dentures. and presents with     Plan:  1. CAD AND h/o 4/19 PCI of LAD and PTCA of instent stent restenosis Continue aspirin, BB, ace inhibitor and statis, ranexa,echo is normal, she uses oxygen at home as she has possible COPD, PFT is done also  2. Leg swelling:venous doppler is normal, recommend compression socks  3. DM: stable continue metformin    4. Dyslipidemia: continue statins  5. HTN: stable, continue coreg and lisinopril  6. Health maintenance: exerise and diet  All labs, medications and tests reviewed, continue all other medications of all above medical condition listed as is.

## 2020-10-09 NOTE — LETTER
Venice Ireland  [birth year]  [id number]    Have you had any Chest Pain - Yes  If Yes DO EKG - How does it feel - Sharp Pain   How long does the pain last - hours   How long have you been having the pain - Years   Did you take a NONE   And did it relieve the pain - it goes away on it's own    Have you had any Shortness of Breath - Yes  If Yes - When on exertion    Have you had any dizziness - Yes, pt takes Meclizine for Vertigo  If Yes DO ORTHOSTATIC BP - when do you feel dizzy with position change   How long does it last seconds    Have you had any palpitations - No     Is the patient on any of the following medications - NONE  If Yes DO EKG    Do you have any edema - swelling in legs  To feet  If Yes - CHECK TO SEE IF THE EDEMA IS PITTING  How long have they been having edema - Months  If Yes - Have they worn compression stockings No    Check Venous \"LEG PROBLEM Questionnaire\"    Do you have a surgery or procedure scheduled in the near future - No  If Yes- DO EKG    Ask patient [facility] if [facility] are not already [facility]    Check to see if we have an E-MAIL on file for the patient    Check medication list thoroughly!!!  BE SURE TO ASK PATIENT IF THEY NEED MEDICATION REFILLS

## 2021-02-17 ENCOUNTER — VIRTUAL VISIT (OUTPATIENT)
Dept: PULMONOLOGY | Age: 71
End: 2021-02-17
Payer: MEDICARE

## 2021-02-17 DIAGNOSIS — Z01.818 PREOP TESTING: ICD-10-CM

## 2021-02-17 DIAGNOSIS — G47.34 NOCTURNAL HYPOXIA: ICD-10-CM

## 2021-02-17 DIAGNOSIS — J98.4 RESTRICTIVE LUNG DISEASE SECONDARY TO OBESITY: Primary | ICD-10-CM

## 2021-02-17 DIAGNOSIS — E66.01 MORBID OBESITY WITH BMI OF 40.0-44.9, ADULT (HCC): ICD-10-CM

## 2021-02-17 DIAGNOSIS — Z00.00 HEALTHCARE MAINTENANCE: ICD-10-CM

## 2021-02-17 DIAGNOSIS — E66.9 RESTRICTIVE LUNG DISEASE SECONDARY TO OBESITY: Primary | ICD-10-CM

## 2021-02-17 DIAGNOSIS — R06.09 DYSPNEA ON EXERTION: ICD-10-CM

## 2021-02-17 PROCEDURE — 3017F COLORECTAL CA SCREEN DOC REV: CPT | Performed by: NURSE PRACTITIONER

## 2021-02-17 PROCEDURE — 4040F PNEUMOC VAC/ADMIN/RCVD: CPT | Performed by: NURSE PRACTITIONER

## 2021-02-17 PROCEDURE — G8399 PT W/DXA RESULTS DOCUMENT: HCPCS | Performed by: NURSE PRACTITIONER

## 2021-02-17 PROCEDURE — 1123F ACP DISCUSS/DSCN MKR DOCD: CPT | Performed by: NURSE PRACTITIONER

## 2021-02-17 PROCEDURE — 1090F PRES/ABSN URINE INCON ASSESS: CPT | Performed by: NURSE PRACTITIONER

## 2021-02-17 PROCEDURE — G8427 DOCREV CUR MEDS BY ELIG CLIN: HCPCS | Performed by: NURSE PRACTITIONER

## 2021-02-17 PROCEDURE — 99214 OFFICE O/P EST MOD 30 MIN: CPT | Performed by: NURSE PRACTITIONER

## 2021-02-17 RX ORDER — ALBUTEROL SULFATE 90 UG/1
2 AEROSOL, METERED RESPIRATORY (INHALATION) EVERY 6 HOURS PRN
Qty: 3 INHALER | Refills: 3 | Status: SHIPPED | OUTPATIENT
Start: 2021-02-17 | End: 2021-08-18

## 2021-02-17 RX ORDER — BUDESONIDE AND FORMOTEROL FUMARATE DIHYDRATE 160; 4.5 UG/1; UG/1
2 AEROSOL RESPIRATORY (INHALATION) 2 TIMES DAILY
Qty: 1 INHALER | Refills: 5 | Status: SHIPPED | OUTPATIENT
Start: 2021-02-17 | End: 2021-04-02

## 2021-02-17 NOTE — PROGRESS NOTES
[address] Pulmonary   Virtual Visit Note      Dania Ventura is a 65 y.o. female evaluated via my chart video on 2/17/2021. Video via doxy. Consent:  Pursuant to emergency declaration under the [address] in the [employer], 1135 waiver authorization in the [facility] Act, this virtual visit was completed with patient's consent, to reduce the patient's risk of exposure to COVID-19 and provide necessary medical care. She and/or health care decision maker is aware that that she may receive a bill for this telephone service, depending on her insurance coverage, and has provided verbal consent to proceed. Patient is at his home and Provider is currently in Pulmonary Clinic at [address] [place] [address]. Documentation:  I last saw Gwen Coon in the office 8/20/2020. Since that time she has done very well. She has not required any hospitalizations, visits to the emergency room, urgent care or walk-in clinic for respiratory related illness. She is using her Symbicort twice a day and rinsing her mouth well after each use. She does use her albuterol rescue inhaler once a day sometimes more depending upon activity. She states she has found herself lately being out at the store about 20 minutes from leaving the house and will have bronchospasms and shortness of breath unfortunately she forgets to take her albuterol rescue inhaler with her sometimes to use when she is short of breath. She remains on her oxygen 2 L/min wearing oxygen at bedtime, her sleep study that was completed in 2019 demonstrated AHI of 4.6 but did demonstrate nocturnal hypoxemia. Both she and her  are scheduled to receive their first dose of the COVID-19 vaccine [facility] She has been monitoring her sat at home with activity and at rest.  She states her O2 sat when she woke up this morning was 94%.   She states occasionally she will get short of breath if she is \"doing a lot of activity at home\" she will slowdown use her albuterol rescue inhaler and then feels better. Flory Porter states that she has been trying to lose weight she states she is lost a few pounds but has been very difficult losing weight while remaining isolated as much as she can in her home due to the COVID-19 virus. Flory Porter states while they are isolating as much as they can when she does go out in public she is wearing her mask, washing her hands frequently or using hand . She denies any recent contact with anyone positive COVID-19 or under investigation for possible COVID-19 exposure. She denies any fever, chills, headache, sinus discomfort or congestion, lightheadedness, change in sensation of taste or smell, nausea, diarrhea, abdominal pain, fatigue. Past Medical History:   Diagnosis Date    Acid reflux     Angina at rest [facility]) In Past    Denies Chest Pain At This Time    Arthritis     \"Knees, Back And Hips\"    Asthma     Broken teeth     Upper    CAD (coronary artery disease)     Sees Dr. Ana Laura Platt a couple of heart stents- had stress test in May 2018 all ok\"    Chronic back pain     Degenerative joint disease     Back     Diabetes mellitus ([facility] [address] Dx 2003    GI bleed 11-09    H/O Doppler venous ultrasound 02/11/2019    No significant insufficiency noted.  H/O echocardiogram 07/14/2014    EF 50%, low normal systolic function, no wall motion abnormalities, mild concentric hypertrophy and signs of diastolic dysfunction, mildly dilated left atrium,  no pericardial effusion, mild mitral and tricuspid regurg.  H/O echocardiogram 11/2219    EF 55-60%,  There is physiological to trace amount of pericardial fluid present.  H/O percutaneous transluminal coronary angioplasty 04/30/2019    LAD stent and PIC of restent stenosis also in LAD.     Hiatal hernia     History of complete ECG     07/20/2012=NSR, leftward axis, prob normal for age, poor r-waveprogression, inferior ST-T abnormalities, consider ischemia    History of echocardiogram 01/26/2017    Ejection fraction is visually estimated at 55%. Mild concentric left ventricular hypertrophy. Mildly dilated left atrium. Mild mitral regurgitation is present.  History of nuclear stress test 01/26/2017    cardiolite-moderate ischemia mid anteroseptal,EF70%    History of nuclear stress test 07/12/2018    Normal    Hx of 24 hour EKG monitoring 12/21/2011    brief run of SVT, otherwise noth clinically sig. arrhythmia noted    Hx of cardiovascular stress test 7/25/14 1/23/12 7/14 EF70% normal study 1/23/12=thallium stress=no scintigraphic inducible myocardial ischemia, EF 70%;  03/2011=EF 70%; 03/2010=EF 70%; 12/1997; 02/1997    Hx of CT scan     05/08/2011=CT chest with contrast=examination is moderately degraded by respiratory motion. No evidence for acute pulmonary thromboembolic disease.  Dilated main pulmonary artery suggesting elevated pulm artery pressures    Hx of Doppler ultrasound     bilateral carotid 03/11/2011= no significant stenosis present    Hx of echocardiogram     03/11/2011=mildly hypertreophied left ventricle, EF 60%, mild pulm. htn, mild aortic stenosis; 02/1997   [facility] OTHER MEDICAL     Primary Care Physician Is Dr. Elizabeth Wade Hyperlipidemia     Hypertension     Hyperthyroidism     Hypothyroidism, adult     Kidney stones Last Episode In 2000's    \"Passed Kidney Stones Three Different Times\"    Left hip pain     \"for months- had hip pain\"for steroid injection 7/5/2016    Migraines     Normal cardiac stress test 05/12/16    EF70% Normal    ORLIN (obstructive sleep apnea)     doesnt use cpap, uses o2 2l as needed at night    Post PTCA     2008, 2010, 2016    RECEIVED BLOOD TRANSFUSION 11-09    RECEIVED BLOOD TRANSFUSION, NO REACTION TO THE BLOOD TRANSFUSION RECEIVED    Shortness of breath on exertion     Sleep apnea     NO CPAP- last sleep study done 2011    Teeth missing     Upper And Lower    Ulcer 11-09    Stomach, GI Bleeding, Received Blood Transfusions    Urinary incontinence     \"wear a pad all the time\"    UTI (urinary tract infection) In Past     No Current Symptoms    Venous US Dup     Wears glasses     Wears partial dentures     Upper         Current Outpatient Medications:     albuterol sulfate  (90 Base) MCG/ACT inhaler, Inhale 2 puffs into the lungs every 6 hours as needed for Wheezing, Disp: 3 Inhaler, Rfl: 3    SYMBICORT 160-4.5 MCG/ACT AERO, Inhale 2 puffs into the lungs 2 times daily, Disp: 1 Inhaler, Rfl: 5    Spacer/Aero-Holding Chambers HEATHER, 1 Device by Does not apply route daily as needed (use with albuterol rescue inhaler), Disp: 1 Device, Rfl: 0    Ferrous Sulfate (IRON) 28 MG TABS, Take by mouth daily, Disp: , Rfl:     ranolazine (RANEXA) 500 MG extended release tablet, Take 1 tablet by mouth 2 times daily, Disp: 60 tablet, Rfl: 5    pantoprazole (PROTONIX) 40 MG tablet, Take 40 mg by mouth daily, Disp: , Rfl:     metFORMIN (GLUCOPHAGE) 500 MG tablet, TK 2 T PO QAM AND 3 T PO QPM WITH MEALS, Disp: 60 tablet, Rfl: 11    clopidogrel (PLAVIX) 75 MG tablet, Take 1 tablet by mouth daily, Disp: 30 tablet, Rfl: 3    OXYGEN, Inhale into the lungs, Disp: , Rfl:     quinapril (ACCUPRIL) 40 MG tablet, Take 1 tablet by mouth nightly, Disp: 90 tablet, Rfl: 3    carvedilol (COREG) 12.5 MG tablet, Take 1 tablet by mouth 2 times daily (with meals) (Patient taking differently: Take 6.25 mg by mouth 2 times daily (with meals) ), Disp: 1180 tablet, Rfl: 3    atorvastatin (LIPITOR) 20 MG tablet, Take 1 tablet by mouth nightly, Disp: 90 tablet, Rfl: 3    hydrochlorothiazide (HYDRODIURIL) 25 MG tablet, Take 1 tablet by mouth daily Takes 12.5 daily 1/2 of a 25 mg tablet, Disp: 90 tablet, Rfl: 3    meclizine (ANTIVERT) 25 MG tablet, TK 1 T PO  Q 4 H PRF  DIZZINESS, Disp: , Rfl: 3    B Complex Vitamins (VITAMIN B COMPLEX PO), Take by mouth every morning , Disp: , Rfl:     vitamin D (CHOLECALCIFEROL) 1000 UNIT TABS tablet, Take 1,000 Units by mouth daily, Disp: , Rfl:     ondansetron (ZOFRAN) 4 MG tablet, Take 1 tablet by mouth every 8 hours as needed for Nausea or Vomiting, Disp: 30 tablet, Rfl: 3    HYDROcodone-acetaminophen (NORCO) 5-325 MG per tablet, Take 1 tablet by mouth every 6 hours as needed , Disp: , Rfl: 0    aspirin 81 MG tablet, Take 81 mg by mouth every morning. Last Dose Taken 9-2-14 Due To Scheduled Surgery, Disp: , Rfl:     glipiZIDE (GLUCOTROL) 5 MG tablet, Take 5 mg by mouth 2 times daily (before meals). , Disp: , Rfl:     levothyroxine (SYNTHROID) 150 MCG tablet, Take 150 mcg by mouth nightly., Disp: , Rfl:     ROS:   Constitutional: Denies fever, denies chills   Cardiovascular: Denies chest pain, denies palpitations. Pulmonary: Denies cough, occasional bronchospasms with SOB when out in cold weather, denies wheeze  Musculoskeletal:  Denies joint pain   Psychiatric/Behavioral: Negative for dysphoric mood    Physical exam via video exam per My chart/Doxy:    Gen: No distress. Normal appearance  Eyes:  No sclera icterus. No conjunctival injection. ENT:  External appearance of ears and nose normal.  Resp: No respiratory distress noted, no conversational dyspnea, no cough present during Video exam. O2 sat per patient home monitor 94%  Skin: Exposed areas appear in tact  M/S: No cyanosis. No clubbing. Neuro: no focal deficit, mental status at baseline  Psych: Oriented x 3. Appropriate mood and behavior. Intact judgement and insight. PFT testing:  3/2/2020  FVC 70% predicted, FEV1 77% predicted, FEV1/% predicted, FEF 25-75% 106% predicted, , , DLCO 58  Following administration of bronchodilator there was no response to bronchodilator.    She shows slight decrease in pulmonary function from her last pulmonary function test that was completed 3/29/2019 with decrease is noted in FVC and FEV1      Diagnosis: ICD-10-CM    1. Restrictive lung disease secondary to obesity  J98.4     E66.9    2. Dyspnea on exertion  R06.00    3. Nocturnal hypoxia  G47.34    4. Morbid obesity with BMI of 40.0-44.9, adult ([facility]  E66.01     Z68.41    5. Preop testing  Z01.818 Covid-19 Ambulatory   6. Healthcare maintenance  Z00.00           Plan:  I discussed with Santy Lee use of her albuterol rescue inhaler. I have recommended and encouraged her to use approximately 10 minutes before leaving the house when she is going out in the cold weather to help diminish bronchospasms and shortness of breath. She does not have a spacer and I am recommending that she use a spacer with both her Symbicort which she is using 2 puffs twice a day and her albuterol rescue inhaler. I reminded her the importance of taking her albuterol rescue inhaler and spacer with her when she is away from home. We will repeat a pulmonary function test in March, she will be required to complete a pretesting screen Covid test prior to pulmonary function test.  I have informed her that central scheduling will call her to set up both appointments. I have recommended that we repeat an apnea screening at home with oximetry to again assess an AHI. Her last sleep study which was completed 2019 which demonstrated AHI of 4.6 with nocturnal hypoxia. She has been wearing 2 L O2 at night, I have informed her I would like to assess her apnea and nocturnal hypoxemia with the overnight apnea screen. I have informed her that [facility] AND [facility] medical equipment will call her to schedule time for her to  the equipment. Santy Lee states she has lost a few pounds but is very hard to lose weight. We have discussed eating patterns and quantities of food to monitor at this time since she is unable to get out of the home for any type of walking activity. Santy Lee and her  are scheduled for COVID-19 immunizations at the [facility] on Friday, 2/19/2021. We have discussed key

## 2021-03-18 DIAGNOSIS — Z01.818 PRE-OP TESTING: Primary | ICD-10-CM

## 2021-03-24 ENCOUNTER — HOSPITAL ENCOUNTER (OUTPATIENT)
Dept: LAB | Age: 71
Discharge: HOME | End: 2021-03-24
Payer: MEDICARE

## 2021-03-24 LAB
SARS-COV-2: NOT DETECTED
SOURCE: NORMAL

## 2021-03-24 PROCEDURE — U0003 INFECTIOUS AGENT DETECTION BY NUCLEIC ACID (DNA OR RNA); SEVERE ACUTE RESPIRATORY SYNDROME CORONAVIRUS 2 (SARS-COV-2) (CORONAVIRUS DISEASE [COVID-19]), AMPLIFIED PROBE TECHNIQUE, MAKING USE OF HIGH THROUGHPUT TECHNOLOGIES AS DESCRIBED BY CMS-2020-01-R: HCPCS

## 2021-03-24 PROCEDURE — C9803 HOPD COVID-19 SPEC COLLECT: HCPCS

## 2021-03-29 ENCOUNTER — HOSPITAL ENCOUNTER (OUTPATIENT)
Dept: PULMONOLOGY | Age: 71
Discharge: HOME | End: 2021-03-29
Payer: MEDICARE

## 2021-03-29 DIAGNOSIS — J98.4 RESTRICTIVE LUNG DISEASE SECONDARY TO OBESITY: ICD-10-CM

## 2021-03-29 DIAGNOSIS — E66.9 RESTRICTIVE LUNG DISEASE SECONDARY TO OBESITY: ICD-10-CM

## 2021-03-29 LAB
DLCO %PRED: 81 %
DLCO PRED: NORMAL
DLCO/VA %PRED: NORMAL
DLCO/VA PRED: NORMAL
DLCO/VA: NORMAL
DLCO: NORMAL
EXPIRATORY TIME-POST: NORMAL
EXPIRATORY TIME: NORMAL
FEF 25-75% %CHNG: NORMAL
FEF 25-75% %PRED-POST: NORMAL
FEF 25-75% %PRED-PRE: NORMAL
FEF 25-75% PRED: NORMAL
FEF 25-75%-POST: NORMAL
FEF 25-75%-PRE: NORMAL
FEV1 %PRED-POST: 76 %
FEV1 %PRED-PRE: 74 %
FEV1 PRED: NORMAL
FEV1-POST: NORMAL
FEV1-PRE: NORMAL
FEV1/FVC %PRED-POST: NORMAL
FEV1/FVC %PRED-PRE: NORMAL
FEV1/FVC PRED: NORMAL
FEV1/FVC-POST: 86 %
FEV1/FVC-PRE: 83 %
FVC %PRED-POST: NORMAL
FVC %PRED-PRE: NORMAL
FVC PRED: NORMAL
FVC-POST: NORMAL
FVC-PRE: NORMAL
GAW %PRED: NORMAL
GAW PRED: NORMAL
GAW: NORMAL
IC %PRED: NORMAL
IC PRED: NORMAL
IC: NORMAL
MEP: NORMAL
MIP: NORMAL
MVV %PRED-PRE: NORMAL
MVV PRED: NORMAL
MVV-PRE: NORMAL
PEF %PRED-POST: NORMAL
PEF %PRED-PRE: NORMAL
PEF PRED: NORMAL
PEF%CHNG: NORMAL
PEF-POST: NORMAL
PEF-PRE: NORMAL
RAW %PRED: NORMAL
RAW PRED: NORMAL
RAW: NORMAL
RV %PRED: NORMAL
RV PRED: NORMAL
RV: NORMAL
SVC %PRED: NORMAL
SVC PRED: NORMAL
SVC: NORMAL
TLC %PRED: 76 %
TLC PRED: NORMAL
TLC: NORMAL
VA %PRED: NORMAL
VA PRED: NORMAL
VA: NORMAL
VTG %PRED: NORMAL
VTG PRED: NORMAL
VTG: NORMAL

## 2021-03-29 PROCEDURE — 94060 EVALUATION OF WHEEZING: CPT

## 2021-03-29 PROCEDURE — 94729 DIFFUSING CAPACITY: CPT

## 2021-03-29 PROCEDURE — 94726 PLETHYSMOGRAPHY LUNG VOLUMES: CPT

## 2021-03-29 ASSESSMENT — PULMONARY FUNCTION TESTS
FEV1_PERCENT_PREDICTED_PRE: 74
FEV1_PERCENT_PREDICTED_POST: 76
FEV1/FVC_POST: 86
FEV1/FVC_PRE: 83

## 2021-04-02 DIAGNOSIS — J98.4 RESTRICTIVE LUNG DISEASE SECONDARY TO OBESITY: ICD-10-CM

## 2021-04-02 DIAGNOSIS — E66.9 RESTRICTIVE LUNG DISEASE SECONDARY TO OBESITY: ICD-10-CM

## 2021-04-02 RX ORDER — BUDESONIDE AND FORMOTEROL FUMARATE DIHYDRATE 160; 4.5 UG/1; UG/1
AEROSOL RESPIRATORY (INHALATION)
Qty: 1 INHALER | Refills: 5 | Status: SHIPPED | OUTPATIENT
Start: 2021-04-02

## 2021-04-16 ENCOUNTER — OFFICE VISIT (OUTPATIENT)
Dept: CARDIOLOGY CLINIC | Age: 71
End: 2021-04-16
Payer: MEDICARE

## 2021-04-16 VITALS
WEIGHT: 238 LBS | HEIGHT: 63 IN | HEART RATE: 80 BPM | DIASTOLIC BLOOD PRESSURE: 80 MMHG | BODY MASS INDEX: 42.17 KG/M2 | SYSTOLIC BLOOD PRESSURE: 126 MMHG

## 2021-04-16 DIAGNOSIS — I25.10 CORONARY ARTERY DISEASE INVOLVING NATIVE CORONARY ARTERY OF NATIVE HEART WITHOUT ANGINA PECTORIS: Primary | ICD-10-CM

## 2021-04-16 DIAGNOSIS — R07.89 OTHER CHEST PAIN: ICD-10-CM

## 2021-04-16 PROCEDURE — 4040F PNEUMOC VAC/ADMIN/RCVD: CPT | Performed by: INTERNAL MEDICINE

## 2021-04-16 PROCEDURE — G8399 PT W/DXA RESULTS DOCUMENT: HCPCS | Performed by: INTERNAL MEDICINE

## 2021-04-16 PROCEDURE — 1036F TOBACCO NON-USER: CPT | Performed by: INTERNAL MEDICINE

## 2021-04-16 PROCEDURE — G8417 CALC BMI ABV UP PARAM F/U: HCPCS | Performed by: INTERNAL MEDICINE

## 2021-04-16 PROCEDURE — 3017F COLORECTAL CA SCREEN DOC REV: CPT | Performed by: INTERNAL MEDICINE

## 2021-04-16 PROCEDURE — G8427 DOCREV CUR MEDS BY ELIG CLIN: HCPCS | Performed by: INTERNAL MEDICINE

## 2021-04-16 PROCEDURE — 1123F ACP DISCUSS/DSCN MKR DOCD: CPT | Performed by: INTERNAL MEDICINE

## 2021-04-16 PROCEDURE — 1090F PRES/ABSN URINE INCON ASSESS: CPT | Performed by: INTERNAL MEDICINE

## 2021-04-16 PROCEDURE — 93000 ELECTROCARDIOGRAM COMPLETE: CPT | Performed by: INTERNAL MEDICINE

## 2021-04-16 PROCEDURE — 99214 OFFICE O/P EST MOD 30 MIN: CPT | Performed by: INTERNAL MEDICINE

## 2021-04-16 NOTE — LETTER
Alesha Ulrich  [birth year]  [id number]    Have you had any Chest Pain that is not new? - Yes  If Yes DO EKG - How does it feel - Sharp Pain   How long does the pain last - a few days All Day    Did you take a no       Have you had any Shortness of Breath - Yes  If Yes - When on exertion    Have you had any dizziness - No     Have you had any palpitations that are not new? - No       Is the patient on any of the following medications - no  If Yes DO EKG - Needs done every 6 months    Do you have any edema - swelling in No      If Yes - Have they worn compression stockings No    Vein \"LEG PROBLEM Questionnaire\"  1. Do you have prominent leg veins? No   2. Do you have any skin discoloration? No  3. Do you have any healed or active sores? No  4. Do you have swelling of the legs? No  5. Do you have a family history of varicose veins? No  6. Does your profession involve pro-longed        standing or heavy lifting? No  7. Have you been fighting overweight problems? No  8. Do you have restless legs? Yes  9. Do you have any night time cramps? No  10.  Do you have any of the following in your legs:        Aching     When did you have your last labs drawn no    Do you have a surgery or procedure scheduled in the near future - No

## 2021-04-16 NOTE — PROGRESS NOTES
Vero Alex MD        OFFICE  FOLLOWUP NOTE    Chief complaints: patient is here for management of CAD, HTN, DYSLPIDEMIA, DM,leg swelling, sleep apnea    Subjective: patient feels better, + chest pain, no shortness of breath, no dizziness, no palpitations    HPI Darren Liang is a 65 y. o.year old who  has a past medical history of Acid reflux, Angina at rest [facility]), Arthritis, Asthma, Broken teeth, CAD (coronary artery disease), Chronic back pain, Degenerative joint disease, Diabetes mellitus ([facility] [address] GI bleed, H/O Doppler venous ultrasound, H/O echocardiogram, H/O echocardiogram, H/O percutaneous transluminal coronary angioplasty, Hiatal hernia, History of complete ECG, History of echocardiogram, History of nuclear stress test, History of nuclear stress test, Hx of 24 hour EKG monitoring, Hx of cardiovascular stress test, Hx of CT scan, Hx of Doppler ultrasound, Hx of echocardiogram, HX OTHER MEDICAL, Hyperlipidemia, Hypertension, Hyperthyroidism, Hypothyroidism, adult, Kidney stones, Left hip pain, Migraines, Normal cardiac stress test, ORLIN (obstructive sleep apnea), Post PTCA, RECEIVED BLOOD TRANSFUSION, Shortness of breath on exertion, Sleep apnea, Teeth missing, Ulcer, Urinary incontinence, UTI (urinary tract infection), Venous US Dup, Wears glasses, and Wears partial dentures.  and presents for management of CAD, HTN, DYSLPIDEMIA, DM,leg swelling, sleep apnea which are well controlled    Patient is on home oxygen at night ( not on CPAP), she felt chest pain, it was similar to the one she had before LAD PCI, its ok now  Current Outpatient Medications   Medication Sig Dispense Refill    SYMBICORT 160-4.5 MCG/ACT AERO INHALE 2 PUFFS INTO THE LUNGS TWICE DAILY 1 Inhaler 5    albuterol sulfate  (90 Base) MCG/ACT inhaler Inhale 2 puffs into the lungs every 6 hours as needed for Wheezing 3 Inhaler 3    Spacer/Aero-Holding Chambers HEATHER 1 Device by Does not apply route daily as needed (use with albuterol rescue inhaler) 1 Device 0    Ferrous Sulfate (IRON) 28 MG TABS Take by mouth daily      ranolazine (RANEXA) 500 MG extended release tablet Take 1 tablet by mouth 2 times daily 60 tablet 5    pantoprazole (PROTONIX) 40 MG tablet Take 40 mg by mouth daily      metFORMIN (GLUCOPHAGE) 500 MG tablet TK 2 T PO QAM AND 3 T PO QPM WITH MEALS 60 tablet 11    clopidogrel (PLAVIX) 75 MG tablet Take 1 tablet by mouth daily 30 tablet 3    OXYGEN Inhale into the lungs      quinapril (ACCUPRIL) 40 MG tablet Take 1 tablet by mouth nightly 90 tablet 3    carvedilol (COREG) 12.5 MG tablet Take 1 tablet by mouth 2 times daily (with meals) (Patient taking differently: Take 6.25 mg by mouth 2 times daily (with meals) ) 1180 tablet 3    hydrochlorothiazide (HYDRODIURIL) 25 MG tablet Take 1 tablet by mouth daily Takes 12.5 daily 1/2 of a 25 mg tablet 90 tablet 3    meclizine (ANTIVERT) 25 MG tablet TK 1 T PO  Q 4 H PRF  DIZZINESS  3    B Complex Vitamins (VITAMIN B COMPLEX PO) Take by mouth every morning       vitamin D (CHOLECALCIFEROL) 1000 UNIT TABS tablet Take 1,000 Units by mouth daily      ondansetron (ZOFRAN) 4 MG tablet Take 1 tablet by mouth every 8 hours as needed for Nausea or Vomiting 30 tablet 3    HYDROcodone-acetaminophen (NORCO) 5-325 MG per tablet Take 1 tablet by mouth every 6 hours as needed   0    aspirin 81 MG tablet Take 81 mg by mouth every morning. Last Dose Taken 9-2-14 Due To Scheduled Surgery      glipiZIDE (GLUCOTROL) 5 MG tablet Take 5 mg by mouth 2 times daily (before meals).  levothyroxine (SYNTHROID) 150 MCG tablet Take 150 mcg by mouth nightly.  atorvastatin (LIPITOR) 20 MG tablet Take 1 tablet by mouth nightly (Patient not taking: Reported on 4/16/2021) 90 tablet 3     No current facility-administered medications for this visit.       Allergies: Codeine  Past Medical History:   Diagnosis Date    Acid reflux     Angina at rest [facility]) In Past    Denies Chest Pain At This Time    Arthritis     \"Knees, Back And Hips\"    Asthma     Broken teeth     Upper    CAD (coronary artery disease)     Sees Dr. Marcos Bent a couple of heart stents- had stress test in May 2018 all ok\"    Chronic back pain     Degenerative joint disease     Back     Diabetes mellitus ([address] Dx 2003    GI bleed 11-09    H/O Doppler venous ultrasound 02/11/2019    No significant insufficiency noted.  H/O echocardiogram 07/14/2014    EF 50%, low normal systolic function, no wall motion abnormalities, mild concentric hypertrophy and signs of diastolic dysfunction, mildly dilated left atrium,  no pericardial effusion, mild mitral and tricuspid regurg.  H/O echocardiogram 11/2219    EF 55-60%,  There is physiological to trace amount of pericardial fluid present.  H/O percutaneous transluminal coronary angioplasty 04/30/2019    LAD stent and PIC of restent stenosis also in LAD.  Hiatal hernia     History of complete ECG     07/20/2012=NSR, leftward axis, prob normal for age, poor r-waveprogression, inferior ST-T abnormalities, consider ischemia    History of echocardiogram 01/26/2017    Ejection fraction is visually estimated at 55%. Mild concentric left ventricular hypertrophy. Mildly dilated left atrium. Mild mitral regurgitation is present.  History of nuclear stress test 01/26/2017    cardiolite-moderate ischemia mid anteroseptal,EF70%    History of nuclear stress test 07/12/2018    Normal    Hx of 24 hour EKG monitoring 12/21/2011    brief run of SVT, otherwise noth clinically sig. arrhythmia noted    Hx of cardiovascular stress test 7/25/14 1/23/12 7/14 EF70% normal study 1/23/12=thallium stress=no scintigraphic inducible myocardial ischemia, EF 70%;  03/2011=EF 70%; 03/2010=EF 70%; 12/1997; 02/1997    Hx of CT scan     05/08/2011=CT chest with contrast=examination is moderately degraded by respiratory motion. No evidence for acute pulmonary thromboembolic disease.  Dilated main 2.5x8 taxus stent to the ostium of the circ.  DENTAL SURGERY      Teeth Extracted In Past    DIAGNOSTIC CARDIAC CATH LAB PROCEDURE  2010    balloon angioplasty of distal mid LAD;     DIAGNOSTIC CARDIAC CATH LAB PROCEDURE  2008    LAD stent 2.75x16 taxus    DIAGNOSTIC CARDIAC CATH LAB PROCEDURE  2008    2. 5x8 taxus stent to the ostium of the circ.  ENDOSCOPY, COLON, DIAGNOSTIC  \"Several\"    ENDOSCOPY, COLON, DIAGNOSTIC  3/23/15    small hiatal hernia, anatomy consistent with banding, gastritis    FINGER TRIGGER RELEASE  10-09 \"One On Each Hand Trigger Finger Release\"     \"Left Middle Finger Trigger Finger Release\"    HYSTERECTOMY  1982    Partial Abdominal Hysterectomy, \"Pinned The Bladder Up\"    JOINT REPLACEMENT Right     Total Right Hip    JOINT REPLACEMENT Right     Total Right Knee    JOINT REPLACEMENT Left     Total Left Knee    JOINT REPLACEMENT Left     Revision Total Left Knee    KNEE SURGERY Right ,     KNEE SURGERY Left     Left Knee Bone Graft    KNEE SURGERY Right     Right Knee Bone Graft    OTHER SURGICAL HISTORY      \"Stomach Stapling\" Pre Surgery Weight Was 230 lbs    OTHER SURGICAL HISTORY Left 04/10/2017    left hip steroid injection    PTCA  16    Stenting of the LAD.     SHOULDER ARTHROSCOPY Right 2-10    SHOULDER ARTHROSCOPY Left 09/10/14    TONSILLECTOMY AND ADENOIDECTOMY  1964    TUBAL LIGATION      Done With     WISDOM TOOTH EXTRACTION      All Four Addison Teeth Extracted In Past     Family History   Problem Relation Age of Onset    Cancer Mother         Liver Cancer    Heart Disease Mother     High Blood Pressure Mother     Asthma Mother     Cancer Father         Colon Cancer    Heart Disease Father     High Blood Pressure Father     Colon Cancer Father     Arthritis Sister     Early Death Sister 52    High Blood Pressure Sister     Other Sister         \"Breathing Problem\"    Heart Disease Son         Open Heart Surgery    Diabetes Son     High Blood Pressure Son     Mental Illness Daughter         Bipolar    Early Death Sister 61        Liver Cancer    Cancer Sister         Liver Cancer    Stomach Cancer Neg Hx      Social History     Tobacco Use    Smoking status: Former Smoker     Packs/day: 0.25     Years: 38.00     Pack years: 9.50     Types: Cigarettes     Start date: 1962     Quit date: 2000     Years since quittin.6    Smokeless tobacco: Never Used    Tobacco comment: \"never a heavy smoker just occ smoker\"   Substance Use Topics    Alcohol use: No      [unfilled]  Review of Systems:   · Constitutional: No Fever or Weight Loss   · Eyes: No Decreased Vision  · ENT: No Headaches, Hearing Loss or Vertigo  · Cardiovascular: + chest pain, dyspnea on exertion, palpitations or loss of consciousness  · Respiratory: No cough or wheezing    · Gastrointestinal: No abdominal pain, appetite loss, blood in stools, constipation, diarrhea or heartburn  · Genitourinary: No dysuria, trouble voiding, or hematuria  · Musculoskeletal:  No gait disturbance, weakness or joint complaints  · Integumentary: No rash or pruritis  · Neurological: No TIA or stroke symptoms  · Psychiatric: No anxiety or depression  · Endocrine: No malaise, fatigue or temperature intolerance  · Hematologic/Lymphatic: No bleeding problems, blood clots or swollen lymph nodes  · Allergic/Immunologic: No nasal congestion or hives  All systems negative except as marked. Objective:  /80   Pulse 80   Ht 5' 2.5\" (1.588 m)   Wt 238 lb (108 kg)   BMI 42.84 kg/m²   Wt Readings from Last 3 Encounters:   21 238 lb (108 kg)   10/09/20 229 lb 9.6 oz (104.1 kg)   20 230 lb (104.3 kg)     Body mass index is 42.84 kg/m².   GENERAL - Alert, oriented, pleasant, in no apparent distress,normal grooming  HEENT  pupils are intact, cornea intact, conjunctive normal color, ears are normal in exam,  Neck - ultrasound, H/O echocardiogram, H/O echocardiogram, H/O percutaneous transluminal coronary angioplasty, Hiatal hernia, History of complete ECG, History of echocardiogram, History of nuclear stress test, History of nuclear stress test, Hx of 24 hour EKG monitoring, Hx of cardiovascular stress test, Hx of CT scan, Hx of Doppler ultrasound, Hx of echocardiogram, HX OTHER MEDICAL, Hyperlipidemia, Hypertension, Hyperthyroidism, Hypothyroidism, adult, Kidney stones, Left hip pain, Migraines, Normal cardiac stress test, ORLIN (obstructive sleep apnea), Post PTCA, RECEIVED BLOOD TRANSFUSION, Shortness of breath on exertion, Sleep apnea, Teeth missing, Ulcer, Urinary incontinence, UTI (urinary tract infection), Venous US Dup, Wears glasses, and Wears partial dentures. and presents with     Plan:  1. Chest pain: stress test ordered, she has h/o 4/19 PCI of LAD and PTCA of instent stent restenosis Continue aspirin, BB, ace inhibitor and statis, ranexa,echo is normal, she uses oxygen at home as she has possible COPD, PFT is done also  2. Leg swelling:venous doppler is normal, recommend compression socks  3. DM: stable continue metformin    4. Dyslipidemia: continue statins  5. HTN: stable, continue coreg and lisinopril  All labs, medications and tests reviewed, continue all other medications of all above medical condition listed as is.

## 2021-05-04 ENCOUNTER — PROCEDURE VISIT (OUTPATIENT)
Dept: CARDIOLOGY CLINIC | Age: 71
End: 2021-05-04
Payer: MEDICARE

## 2021-05-04 DIAGNOSIS — R07.89 OTHER CHEST PAIN: ICD-10-CM

## 2021-05-04 DIAGNOSIS — I25.10 CORONARY ARTERY DISEASE INVOLVING NATIVE CORONARY ARTERY OF NATIVE HEART WITHOUT ANGINA PECTORIS: ICD-10-CM

## 2021-05-04 LAB
LV EF: 68 %
LVEF MODALITY: NORMAL

## 2021-05-04 PROCEDURE — 93017 CV STRESS TEST TRACING ONLY: CPT | Performed by: INTERNAL MEDICINE

## 2021-05-04 PROCEDURE — 93018 CV STRESS TEST I&R ONLY: CPT | Performed by: INTERNAL MEDICINE

## 2021-05-04 PROCEDURE — 93016 CV STRESS TEST SUPVJ ONLY: CPT | Performed by: INTERNAL MEDICINE

## 2021-05-04 PROCEDURE — 78452 HT MUSCLE IMAGE SPECT MULT: CPT | Performed by: INTERNAL MEDICINE

## 2021-05-04 PROCEDURE — A9500 TC99M SESTAMIBI: HCPCS | Performed by: INTERNAL MEDICINE

## 2021-05-05 ENCOUNTER — TELEPHONE (OUTPATIENT)
Dept: CARDIOLOGY CLINIC | Age: 71
End: 2021-05-05

## 2021-05-05 NOTE — TELEPHONE ENCOUNTER
Conclusions       [facility] .    Normal LV function. normal EDV    There is normal isotope uptake following exercise and at rest. There is no    evidence of exercise induced ischemia.    This is a normal study.        Recommendation    Recommendation: Routine follow-up.      Left message on voice mail regarding results of stress test.

## 2021-05-13 ENCOUNTER — APPOINTMENT (OUTPATIENT)
Dept: GENERAL RADIOLOGY | Age: 71
End: 2021-05-13
Payer: MEDICARE

## 2021-05-13 ENCOUNTER — HOSPITAL ENCOUNTER (EMERGENCY)
Age: 71
Discharge: HOME | End: 2021-05-13
Attending: EMERGENCY MEDICINE
Payer: MEDICARE

## 2021-05-13 ENCOUNTER — TELEPHONE (OUTPATIENT)
Dept: ORTHOPEDIC SURGERY | Age: 71
End: 2021-05-13

## 2021-05-13 VITALS
TEMPERATURE: 98.4 F | BODY MASS INDEX: 43.24 KG/M2 | WEIGHT: 235 LBS | OXYGEN SATURATION: 94 % | SYSTOLIC BLOOD PRESSURE: 101 MMHG | HEIGHT: 62 IN | DIASTOLIC BLOOD PRESSURE: 69 MMHG | RESPIRATION RATE: 16 BRPM | HEART RATE: 74 BPM
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
## 2021-05-13 DIAGNOSIS — M25.552 ACUTE PAIN OF LEFT HIP: ICD-10-CM

## 2021-05-13 DIAGNOSIS — M25.522 LEFT ELBOW PAIN: ICD-10-CM

## 2021-05-13 DIAGNOSIS — S42.492A OTHER CLOSED DISPLACED FRACTURE OF DISTAL END OF LEFT HUMERUS, INITIAL ENCOUNTER: ICD-10-CM

## 2021-05-13 LAB
ALBUMIN SERPL-MCNC: 3.9 GM/DL (ref 3.4–5)
ALP BLD-CCNC: 62 IU/L (ref 40–128)
ALT SERPL-CCNC: 59 U/L (ref 10–40)
ANION GAP SERPL CALCULATED.3IONS-SCNC: 12 MMOL/L (ref 4–16)
AST SERPL-CCNC: 66 IU/L (ref 15–37)
BASOPHILS ABSOLUTE: 0.1 K/CU MM
BASOPHILS RELATIVE PERCENT: 1.1 % (ref 0–1)
BILIRUB SERPL-MCNC: 0.6 MG/DL (ref 0–1)
BUN BLDV-MCNC: 12 MG/DL (ref 6–23)
CALCIUM SERPL-MCNC: 9 MG/DL (ref 8.3–10.6)
CHLORIDE BLD-SCNC: 103 MMOL/L (ref 99–110)
CO2: 24 MMOL/L (ref 21–32)
CREAT SERPL-MCNC: 0.6 MG/DL (ref 0.6–1.1)
DIFFERENTIAL TYPE: ABNORMAL
EOSINOPHILS ABSOLUTE: 0.2 K/CU MM
EOSINOPHILS RELATIVE PERCENT: 3 % (ref 0–3)
GFR AFRICAN AMERICAN: >60 ML/MIN/1.73M2
GFR NON-AFRICAN AMERICAN: >60 ML/MIN/1.73M2
GLUCOSE BLD-MCNC: 202 MG/DL (ref 70–99)
HCT VFR BLD CALC: 42.8 % (ref 37–47)
HEMOGLOBIN: 13.8 GM/DL (ref 12.5–16)
IMMATURE NEUTROPHIL %: 0.6 % (ref 0–0.43)
LYMPHOCYTES ABSOLUTE: 1.7 K/CU MM
LYMPHOCYTES RELATIVE PERCENT: 23.1 % (ref 24–44)
MCH RBC QN AUTO: 29.7 PG (ref 27–31)
MCHC RBC AUTO-ENTMCNC: 32.2 % (ref 32–36)
MCV RBC AUTO: 92.2 FL (ref 78–100)
MONOCYTES ABSOLUTE: 0.7 K/CU MM
MONOCYTES RELATIVE PERCENT: 9 % (ref 0–4)
NUCLEATED RBC %: 0 %
PDW BLD-RTO: 14 % (ref 11.7–14.9)
PLATELET # BLD: 190 K/CU MM (ref 140–440)
PMV BLD AUTO: 11.5 FL (ref 7.5–11.1)
POTASSIUM SERPL-SCNC: 4 MMOL/L (ref 3.5–5.1)
RBC # BLD: 4.64 M/CU MM (ref 4.2–5.4)
SEGMENTED NEUTROPHILS ABSOLUTE COUNT: 4.6 K/CU MM
SEGMENTED NEUTROPHILS RELATIVE PERCENT: 63.2 % (ref 36–66)
SODIUM BLD-SCNC: 139 MMOL/L (ref 135–145)
TOTAL IMMATURE NEUTOROPHIL: 0.04 K/CU MM
TOTAL NUCLEATED RBC: 0 K/CU MM
TOTAL PROTEIN: 6.6 GM/DL (ref 6.4–8.2)
WBC # BLD: 7.3 K/CU MM (ref 4–10.5)

## 2021-05-13 PROCEDURE — 80053 COMPREHEN METABOLIC PANEL: CPT

## 2021-05-13 PROCEDURE — 96374 THER/PROPH/DIAG INJ IV PUSH: CPT

## 2021-05-13 PROCEDURE — 73501 X-RAY EXAM HIP UNI 1 VIEW: CPT

## 2021-05-13 PROCEDURE — 6360000002 HC RX W HCPCS: Performed by: EMERGENCY MEDICINE

## 2021-05-13 PROCEDURE — 96375 TX/PRO/DX INJ NEW DRUG ADDON: CPT

## 2021-05-13 PROCEDURE — 73080 X-RAY EXAM OF ELBOW: CPT

## 2021-05-13 PROCEDURE — 85025 COMPLETE CBC W/AUTO DIFF WBC: CPT

## 2021-05-13 PROCEDURE — 29105 APPLICATION LONG ARM SPLINT: CPT

## 2021-05-13 PROCEDURE — 71046 X-RAY EXAM CHEST 2 VIEWS: CPT

## 2021-05-13 PROCEDURE — 93005 ELECTROCARDIOGRAM TRACING: CPT | Performed by: EMERGENCY MEDICINE

## 2021-05-13 PROCEDURE — 99284 EMERGENCY DEPT VISIT MOD MDM: CPT

## 2021-05-13 RX ORDER — MORPHINE SULFATE 4 MG/ML
4 INJECTION, SOLUTION INTRAMUSCULAR; INTRAVENOUS ONCE
Status: COMPLETED | OUTPATIENT
Start: 2021-05-13 | End: 2021-05-13

## 2021-05-13 RX ORDER — FENTANYL CITRATE 50 UG/ML
25 INJECTION, SOLUTION INTRAMUSCULAR; INTRAVENOUS ONCE
Status: COMPLETED | OUTPATIENT
Start: 2021-05-13 | End: 2021-05-13

## 2021-05-13 RX ORDER — HYDROCODONE BITARTRATE AND ACETAMINOPHEN 5; 325 MG/1; MG/1
1 TABLET ORAL EVERY 6 HOURS PRN
Qty: 12 TABLET | Refills: 0 | Status: SHIPPED | OUTPATIENT
Start: 2021-05-13 | End: 2021-05-16

## 2021-05-13 RX ADMIN — FENTANYL CITRATE 25 MCG: 50 INJECTION, SOLUTION INTRAMUSCULAR; INTRAVENOUS at 12:55

## 2021-05-13 RX ADMIN — MORPHINE SULFATE 4 MG: 4 INJECTION, SOLUTION INTRAMUSCULAR; INTRAVENOUS at 12:17

## 2021-05-13 ASSESSMENT — PAIN SCALES - GENERAL
PAINLEVEL_OUTOF10: 10
PAINLEVEL_OUTOF10: 2

## 2021-05-13 ASSESSMENT — PAIN DESCRIPTION - ORIENTATION: ORIENTATION: LEFT

## 2021-05-13 ASSESSMENT — PAIN DESCRIPTION - PAIN TYPE: TYPE: ACUTE PAIN

## 2021-05-13 ASSESSMENT — PAIN DESCRIPTION - LOCATION: LOCATION: ARM;ELBOW;HIP

## 2021-05-13 NOTE — ED PROVIDER NOTES
11/2009    H/O Doppler venous ultrasound 02/11/2019    No significant insufficiency noted.  H/O echocardiogram 07/14/2014    EF 50%, low normal systolic function, no wall motion abnormalities, mild concentric hypertrophy and signs of diastolic dysfunction, mildly dilated left atrium,  no pericardial effusion, mild mitral and tricuspid regurg.  H/O echocardiogram 11/2219    EF 55-60%,  There is physiological to trace amount of pericardial fluid present.  H/O percutaneous transluminal coronary angioplasty 04/30/2019    LAD stent and PIC of restent stenosis also in LAD.  Hiatal hernia     History of complete ECG     07/20/2012=NSR, leftward axis, prob normal for age, poor r-waveprogression, inferior ST-T abnormalities, consider ischemia    History of echocardiogram 01/26/2017    Ejection fraction is visually estimated at 55%. Mild concentric left ventricular hypertrophy. Mildly dilated left atrium. Mild mitral regurgitation is present.  History of nuclear stress test 01/26/2017    cardiolite-moderate ischemia mid anteroseptal,EF70%    History of nuclear stress test 07/12/2018    Normal    Hx of 24 hour EKG monitoring 12/21/2011    brief run of SVT, otherwise noth clinically sig. arrhythmia noted    Hx of cardiovascular stress test 7/25/14 1/23/12 7/14 EF70% normal study 1/23/12=thallium stress=no scintigraphic inducible myocardial ischemia, EF 70%;  03/2011=EF 70%; 03/2010=EF 70%; 12/1997; 02/1997    Hx of cardiovascular stress test 05/04/2020    Normal stress test    Hx of CT scan     05/08/2011=CT chest with contrast=examination is moderately degraded by respiratory motion. No evidence for acute pulmonary thromboembolic disease.  Dilated main pulmonary artery suggesting elevated pulm artery pressures    Hx of Doppler ultrasound     bilateral carotid 03/11/2011= no significant stenosis present    Hx of echocardiogram     03/11/2011=mildly hypertreophied left ventricle, EF 60%, mild pulm. htn, mild aortic stenosis; 1997   [facility] OTHER MEDICAL     Primary Care Physician Is Dr. Fei Hauser Hyperlipidemia     Hypertension     Hyperthyroidism     Hypothyroidism, adult     Kidney stones Last Episode In     \"Passed Kidney Stones Three Different Times\"    Left hip pain     \"for months- had hip pain\"for steroid injection 2016    Migraines     Normal cardiac stress test 2016    EF70% Normal    ORLIN (obstructive sleep apnea)     doesnt use cpap, uses o2 2l as needed at night    Post PTCA     , ,     RECEIVED BLOOD TRANSFUSION 2009    RECEIVED BLOOD TRANSFUSION, NO REACTION TO THE BLOOD TRANSFUSION RECEIVED    Shortness of breath on exertion     Sleep apnea     NO CPAP- last sleep study done     Teeth missing     Upper And Lower    Ulcer 2009    Stomach, GI Bleeding, Received Blood Transfusions    Urinary incontinence     \"wear a pad all the time\"    UTI (urinary tract infection) In Past     No Current Symptoms    Venous US Dup     Wears glasses     Wears partial dentures     Upper       Past Surgical History:   Procedure Laterality Date    CARPAL TUNNEL RELEASE Bilateral  Right      Left     SECTION  1975    Tubal Ligation Also Done In  With    [address]  Last Done In     COLONOSCOPY  3/23/15    Internal hemorrhoids    COLONOSCOPY  04/10/2019    COLONOSCOPY N/A 4/10/2019    COLONOSCOPY POLYPECTOMY SNARE/COLD BIOPSY performed by Racquel Murillo MD at [place] [address]  2010    balloon angioplasty of distal mid LAD;     CORONARY ANGIOPLASTY  2008    LAD stent 2.75x16 taxus    CORONARY ANGIOPLASTY  2008    2. 5x8 taxus stent to the ostium of the circ.     DENTAL SURGERY      Teeth Extracted In Past    DIAGNOSTIC CARDIAC CATH LAB PROCEDURE  2010    balloon angioplasty of distal mid LAD;     DIAGNOSTIC CARDIAC CATH LAB PROCEDURE  2008 LAD stent 2.75x16 taxus    DIAGNOSTIC CARDIAC CATH LAB PROCEDURE  2008    2. 5x8 taxus stent to the ostium of the circ.  ENDOSCOPY, COLON, DIAGNOSTIC  \"Several\"    ENDOSCOPY, COLON, DIAGNOSTIC  3/23/15    small hiatal hernia, anatomy consistent with banding, gastritis    FINGER TRIGGER RELEASE  10-09 \"One On Each Hand Trigger Finger Release\"     \"Left Middle Finger Trigger Finger Release\"    HYSTERECTOMY      Partial Abdominal Hysterectomy, \"Pinned The Bladder Up\"    JOINT REPLACEMENT Right     Total Right Hip    JOINT REPLACEMENT Right     Total Right Knee    JOINT REPLACEMENT Left     Total Left Knee    JOINT REPLACEMENT Left     Revision Total Left Knee    KNEE SURGERY Right ,     KNEE SURGERY Left     Left Knee Bone Graft    KNEE SURGERY Right     Right Knee Bone Graft    OTHER SURGICAL HISTORY      \"Stomach Stapling\" Pre Surgery Weight Was 230 lbs    OTHER SURGICAL HISTORY Left 04/10/2017    left hip steroid injection    PTCA  16    Stenting of the LAD.     SHOULDER ARTHROSCOPY Right 2-10    SHOULDER ARTHROSCOPY Left 09/10/14    TONSILLECTOMY AND ADENOIDECTOMY  1964    TUBAL LIGATION      Done With     WISDOM TOOTH EXTRACTION      All Four Roy Teeth Extracted In Past       Family History   Problem Relation Age of Onset    Cancer Mother         Liver Cancer    Heart Disease Mother     High Blood Pressure Mother     Asthma Mother     Cancer Father         Colon Cancer    Heart Disease Father     High Blood Pressure Father     Colon Cancer Father     Arthritis Sister     Early Death Sister 52    High Blood Pressure Sister     Other Sister         \"Breathing Problem\"    Heart Disease Son         Open Heart Surgery    Diabetes Son     High Blood Pressure Son     Mental Illness Daughter         Bipolar    Early Death Sister 61        Liver Cancer    Cancer Sister         Liver Cancer    Stomach Cancer Neg this visit     Labs:  Results for orders placed or performed during the hospital encounter of 05/13/21   CBC Auto Differential   Result Value Ref Range    WBC 7.3 4.0 - 10.5 K/CU MM    RBC 4.64 4.2 - 5.4 M/CU MM    Hemoglobin 13.8 12.5 - 16.0 GM/DL    Hematocrit 42.8 37 - 47 %    MCV 92.2 78 - 100 FL    MCH 29.7 27 - 31 PG    MCHC 32.2 32.0 - 36.0 %    RDW 14.0 11.7 - 14.9 %    Platelets 372 044 - 980 K/CU MM    MPV 11.5 (H) 7.5 - 11.1 FL    Differential Type AUTOMATED DIFFERENTIAL     Segs Relative 63.2 36 - 66 %    Lymphocytes % 23.1 (L) 24 - 44 %    Monocytes % 9.0 (H) 0 - 4 %    Eosinophils % 3.0 0 - 3 %    Basophils % 1.1 (H) 0 - 1 %    Segs Absolute 4.6 K/CU MM    Lymphocytes Absolute 1.7 K/CU MM    Monocytes Absolute 0.7 K/CU MM    Eosinophils Absolute 0.2 K/CU MM    Basophils Absolute 0.1 K/CU MM    Nucleated RBC % 0.0 %    Total Nucleated RBC 0.0 K/CU MM    Total Immature Neutrophil 0.04 K/CU MM    Immature Neutrophil % 0.6 (H) 0 - 0.43 %   Comprehensive Metabolic Panel w/ Reflex to MG   Result Value Ref Range    Sodium 139 135 - 145 MMOL/L    Potassium 4.0 3.5 - 5.1 MMOL/L    Chloride 103 99 - 110 mMol/L    CO2 24 21 - 32 MMOL/L    BUN 12 6 - 23 MG/DL    CREATININE 0.6 0.6 - 1.1 MG/DL    Glucose 202 (H) 70 - 99 MG/DL    Calcium 9.0 8.3 - 10.6 MG/DL    Albumin 3.9 3.4 - 5.0 GM/DL    Total Protein 6.6 6.4 - 8.2 GM/DL    Total Bilirubin 0.6 0.0 - 1.0 MG/DL    ALT 59 (H) 10 - 40 U/L    AST 66 (H) 15 - 37 IU/L    Alkaline Phosphatase 62 40 - 128 IU/L    GFR Non-African American >60 >60 mL/min/1.73m2    GFR African American >60 >60 mL/min/1.73m2    Anion Gap 12 4 - 16        Radiographs:  Radiologist's Report Reviewed:  Xr Chest (2 Vw)    Result Date: 5/13/2021  EXAMINATION: TWO XRAY VIEWS OF THE CHEST 5/13/2021 10:09 am COMPARISON: April 29, 2019 HISTORY: ORDERING SYSTEM PROVIDED HISTORY: fall TECHNOLOGIST PROVIDED HISTORY: Reason for exam:->fall Reason for Exam: fall FINDINGS: Stable cardiomediastinal silhouette. Low lung volumes are noted. No definite focal consolidation, pleural effusion, or pneumothorax is noted. The osseous structures are stable. Low lung volumes. No focal consolidation. Xr Elbow Left (min 3 Views)    Result Date: 5/13/2021  EXAMINATION: THREE XRAY VIEWS OF THE LEFT ELBOW 5/13/2021 10:09 am COMPARISON: None. HISTORY: ORDERING SYSTEM PROVIDED HISTORY: fall TECHNOLOGIST PROVIDED HISTORY: Reason for exam:->fall Reason for Exam: fall Acute injury after a fall. Initial study. FINDINGS: The examination is somewhat limited secondary to patient positioning. There is an impacted, mildly displaced fracture of the distal left humerus. There is marginal osteophyte formation along the radial neck, without a definite fracture evident. No significant joint effusion. There is diffuse soft tissue swelling. Mildly displaced fracture of the distal left humerus. Xr Hip 1 Vw W Pelvis Left    Result Date: 5/13/2021  EXAMINATION: ONE XRAY VIEW OF THE PELVIS AND TWO XRAY VIEWS LEFT HIP 5/13/2021 10:09 am COMPARISON: 02/28/2018 HISTORY: ORDERING SYSTEM PROVIDED HISTORY: fall TECHNOLOGIST PROVIDED HISTORY: Reason for exam:->fall Reason for Exam: fall Acute hip injury. Initial study. FINDINGS: Status post right hip arthroplasty. There is anatomic alignment and position of the orthopedic hardware. No evidence of hardware complication. No acute fracture or dislocation is demonstrated. There is moderate left hip osteoarthritis. Soft tissues are unremarkable. 1. No acute osseous abnormality of the pelvis or hips. 2. Intact right hip arthroplasty. 3. Moderate left hip osteoarthritis. MDM:  Patient was seen and evaluated in the emergency department by myself. A thorough history and physical exam were performed, prior medical records reviewed. Upon arrival, patient's vital signs were noted patient is hemodynamically stable.   Labs demonstrate no leukocytosis, anemia, thrombocytopenia able to move her fingers. Instructed to follow-up with primary care provider in 3 to 5 days for evaluation. Instructed follow-up with orthopedic surgery Dr. Faye Conway in 1 week for reevaluation. May require additional imaging. Instructed to return to the emergency department immediately with any new, worsening, concerning symptoms. Prescription for Norco was printed for pain. Side effect profile of medications discussed with the patient. Additional verbal and printed discharge instructions were provided. Patient verbalized understanding is agreeable discharge plan. Patient is discharged in stable, ambulatory condition. Clinical Impression:  1. Fall from standing, initial encounter    2. Left elbow pain    3. Other closed displaced fracture of distal end of left humerus, initial encounter    4. Acute pain of left hip        Disposition referral:  [facility] Emergency Department  [facility] [id number] [phone]  Go to   Immediately with any new, worsening, concerning symptoms. Kaylee Martell MD  [address]  [address]  [phone]    In 3 days  Please follow-up with your primary care provider for evaluation. Errol Feliz DO  [address]  [place] [phone]    In 1 week  Please follow-up with orthopedic surgery for reevaluation. He is rest, ice, compress, elevate the extremity. No weightbearing. Please utilize sling for comfort. Disposition medications:  New Prescriptions    HYDROCODONE-ACETAMINOPHEN (NORCO) 5-325 MG PER TABLET    Take 1 tablet by mouth every 6 hours as needed for Pain for up to 3 days. Intended supply: 3 days.  Take lowest dose possible to manage pain       ED Provider Disposition:  DISPOSITION Decision To Discharge 05/13/2021 01:29:05 PM      Comment: Please note this report has been produced using speech recognition software and may contain errors related to that system including errors in grammar, punctuation, and spelling, as well as words and phrases that may be inappropriate. If there are any questions or concerns please feel free to contact the dictating provider for clarification.        Vanessa Santiago DO  05/13/21 [id number]

## 2021-05-13 NOTE — TELEPHONE ENCOUNTER
Patient is calling me for a closed displaced fracture of distal end of left humerus. Patient is requesting to be seen by Dr. Briana Matthews. When would you like patient put onto your [facility] schedule.

## 2021-05-13 NOTE — ED NOTES
.Discharge instructions / prescriptions given and reviewed. Signature obtained. Patient instructed to return if symptoms get worse or any new problems or discomforts arise. No further questions at this time.      Jose Rahman RN  05/13/21 [id number]

## 2021-05-14 LAB
EKG ATRIAL RATE: 60 BPM
EKG DIAGNOSIS: NORMAL
EKG Q-T INTERVAL: 372 MS
EKG QRS DURATION: 98 MS
EKG QTC CALCULATION (BAZETT): 462 MS
EKG R AXIS: -39 DEGREES
EKG T AXIS: 73 DEGREES
EKG VENTRICULAR RATE: 93 BPM

## 2021-05-14 PROCEDURE — 93010 ELECTROCARDIOGRAM REPORT: CPT | Performed by: INTERNAL MEDICINE

## 2021-05-17 ENCOUNTER — OFFICE VISIT (OUTPATIENT)
Dept: ORTHOPEDIC SURGERY | Age: 71
End: 2021-05-17
Payer: MEDICARE

## 2021-05-17 VITALS
RESPIRATION RATE: 18 BRPM | WEIGHT: 235 LBS | BODY MASS INDEX: 41.64 KG/M2 | HEIGHT: 63 IN | OXYGEN SATURATION: 94 % | HEART RATE: 70 BPM

## 2021-05-17 DIAGNOSIS — S42.402A DISPLACED FRACTURE OF DISTAL END OF LEFT HUMERUS: Primary | ICD-10-CM

## 2021-05-17 PROCEDURE — 99203 OFFICE O/P NEW LOW 30 MIN: CPT | Performed by: ORTHOPAEDIC SURGERY

## 2021-05-17 PROCEDURE — G8417 CALC BMI ABV UP PARAM F/U: HCPCS | Performed by: ORTHOPAEDIC SURGERY

## 2021-05-17 PROCEDURE — 1036F TOBACCO NON-USER: CPT | Performed by: ORTHOPAEDIC SURGERY

## 2021-05-17 PROCEDURE — 1090F PRES/ABSN URINE INCON ASSESS: CPT | Performed by: ORTHOPAEDIC SURGERY

## 2021-05-17 PROCEDURE — 3017F COLORECTAL CA SCREEN DOC REV: CPT | Performed by: ORTHOPAEDIC SURGERY

## 2021-05-17 PROCEDURE — 1123F ACP DISCUSS/DSCN MKR DOCD: CPT | Performed by: ORTHOPAEDIC SURGERY

## 2021-05-17 PROCEDURE — 4040F PNEUMOC VAC/ADMIN/RCVD: CPT | Performed by: ORTHOPAEDIC SURGERY

## 2021-05-17 PROCEDURE — G8427 DOCREV CUR MEDS BY ELIG CLIN: HCPCS | Performed by: ORTHOPAEDIC SURGERY

## 2021-05-17 PROCEDURE — 24530 CLTX SPRCNDYLR HUMERAL FX WO: CPT | Performed by: ORTHOPAEDIC SURGERY

## 2021-05-17 PROCEDURE — G8399 PT W/DXA RESULTS DOCUMENT: HCPCS | Performed by: ORTHOPAEDIC SURGERY

## 2021-05-17 RX ORDER — OXYCODONE HYDROCHLORIDE AND ACETAMINOPHEN 5; 325 MG/1; MG/1
1 TABLET ORAL EVERY 6 HOURS PRN
Qty: 25 TABLET | Refills: 0 | Status: SHIPPED | OUTPATIENT
Start: 2021-05-17 | End: 2021-05-24

## 2021-05-17 NOTE — PATIENT INSTRUCTIONS
Fitted for ROM elbow brace locked in at 60 degrees  Wear brace when ambulatory but may remove for hygiene  Continue to take Norco as needed   Rest, ice, and elevate as needed  Avoid heavy lifting, pushing or pulling with the left arm  Follow up next week

## 2021-05-17 NOTE — PROGRESS NOTES
Patient presents to the office as an ED follow up from [facility] after patient was evaluated in the ED on on 5/13/21 after she fell while coming down the stairs and landed on her left upper extremity. X-rays reviewed from the ED revealed a mildly displaced fracture of the distal left humerus. Patient has been placed in splint and sling. Discharged home with the medication of Norco which patient takes and receives no relief. Pain is rated at a 10/10 which patient describes as a constant, sharp stabbing pain along the ulnar aspect of the forearm and lateral side of the elbow. She continues to have bruising and swelling to the extremity and hands, but has good sensation throughout the fingers and hand. XR ELBOW LEFT (MIN 3 VIEWS)  Impression   Mildly displaced fracture of the distal left humerus.

## 2021-05-18 ASSESSMENT — ENCOUNTER SYMPTOMS
COLOR CHANGE: 0
SHORTNESS OF BREATH: 0

## 2021-05-18 NOTE — PROGRESS NOTES
Subjective:      Patient ID: Myron Liz is a 65 y.o. female. Patient presents to the office as an ED follow up from [facility] after patient was evaluated in the ED on on 5/13/21 after she fell while coming down the stairs and landed on her left upper extremity. X-rays reviewed from the ED revealed a mildly displaced fracture of the distal left humerus. Patient has been placed in splint and sling. Discharged home with the medication of Norco which patient takes and receives no relief. Pain is rated at a 10/10 which patient describes as a constant, sharp stabbing pain along the ulnar aspect of the forearm and lateral side of the elbow. She continues to have bruising and swelling to the extremity and hands, but has good sensation throughout the fingers and hand. XR ELBOW LEFT (MIN 3 VIEWS)  Impression  Mildly displaced fracture of the distal left humerus. She comes in today for her first visit with me in regards to her left elbow injury. She states that immediately after she fell she began having deep, throbbing pain globally in the left elbow. She states that since the injury she has kept her splint on and has remained nonweightbearing with the left arm. Patient denies any new injury to the involved extremity/ joint, denies numbness or tingling in the involved extremity and denies fever or chills. Review of Systems   Constitutional: Negative for activity change, chills and fever. Respiratory: Negative for shortness of breath. Cardiovascular: Negative for chest pain. Musculoskeletal: Positive for arthralgias, joint swelling and myalgias. Negative for gait problem. Skin: Negative for color change, pallor, rash and wound. Neurological: Positive for weakness. Negative for numbness.        Past Medical History:   Diagnosis Date    Acid reflux     Angina at rest [facility]) In Past    Denies Chest Pain At This Time    Arthritis     \"Knees, Back And Hips\"    Asthma     Broken teeth Upper    CAD (coronary artery disease)     Sees Dr. Marilyn Polanco a couple of heart stents- had stress test in May 2018 all ok\"    Chronic back pain     Degenerative joint disease     Back     Diabetes mellitus ([address] Dx 2003    GI bleed 11/2009    H/O Doppler venous ultrasound 02/11/2019    No significant insufficiency noted.  H/O echocardiogram 07/14/2014    EF 50%, low normal systolic function, no wall motion abnormalities, mild concentric hypertrophy and signs of diastolic dysfunction, mildly dilated left atrium,  no pericardial effusion, mild mitral and tricuspid regurg.  H/O echocardiogram 11/2219    EF 55-60%,  There is physiological to trace amount of pericardial fluid present.  H/O percutaneous transluminal coronary angioplasty 04/30/2019    LAD stent and PIC of restent stenosis also in LAD.  Hiatal hernia     History of complete ECG     07/20/2012=NSR, leftward axis, prob normal for age, poor r-waveprogression, inferior ST-T abnormalities, consider ischemia    History of echocardiogram 01/26/2017    Ejection fraction is visually estimated at 55%. Mild concentric left ventricular hypertrophy. Mildly dilated left atrium. Mild mitral regurgitation is present.  History of nuclear stress test 01/26/2017    cardiolite-moderate ischemia mid anteroseptal,EF70%    History of nuclear stress test 07/12/2018    Normal    Hx of 24 hour EKG monitoring 12/21/2011    brief run of SVT, otherwise noth clinically sig. arrhythmia noted    Hx of cardiovascular stress test 7/25/14 1/23/12 7/14 EF70% normal study 1/23/12=thallium stress=no scintigraphic inducible myocardial ischemia, EF 70%;  03/2011=EF 70%; 03/2010=EF 70%; 12/1997; 02/1997    Hx of cardiovascular stress test 05/04/2020    Normal stress test    Hx of CT scan     05/08/2011=CT chest with contrast=examination is moderately degraded by respiratory motion. No evidence for acute pulmonary thromboembolic disease.  Dilated main pulmonary artery suggesting elevated pulm artery pressures    Hx of Doppler ultrasound     bilateral carotid 03/11/2011= no significant stenosis present    Hx of echocardiogram     03/11/2011=mildly hypertreophied left ventricle, EF 60%, mild pulm. htn, mild aortic stenosis; 02/1997   [facility] OTHER [facility]ICAL     Primary Care Physician Is Dr. Quentin Guillaume Hyperlipidemia     Hypertension     Hyperthyroidism     Hypothyroidism, adult     Kidney stones Last Episode In 2000's    \"Passed Kidney Stones Three Different Times\"    Left hip pain     \"for months- had hip pain\"for steroid injection 7/5/2016    Migraines     Normal cardiac stress test 05/12/2016    EF70% Normal    ORLIN (obstructive sleep apnea)     doesnt use cpap, uses o2 2l as needed at night    Post PTCA     2008, 2010, 2016    RECEIVED BLOOD TRANSFUSION 11/2009    RECEIVED BLOOD TRANSFUSION, NO REACTION TO THE BLOOD TRANSFUSION RECEIVED    Shortness of breath on exertion     Sleep apnea     NO CPAP- last sleep study done 2011    Teeth missing     Upper And Lower    Ulcer 11/2009    Stomach, GI Bleeding, Received Blood Transfusions    Urinary incontinence     \"wear a pad all the time\"    UTI (urinary tract infection) In Past     No Current Symptoms    Venous US Dup     Wears glasses     Wears partial dentures     Upper       Objective:   Physical Exam  Constitutional:       Appearance: She is well-developed. HENT:      Head: Normocephalic and atraumatic. Eyes:      Pupils: Pupils are equal, round, and reactive to light. Pulmonary:      Effort: Pulmonary effort is normal.   Musculoskeletal:         General: Swelling, tenderness and signs of injury present. No deformity. Right elbow: No swelling, deformity, effusion or lacerations. Normal range of motion. No tenderness. No radial head, medial epicondyle, lateral epicondyle or olecranon process tenderness. Left elbow: Swelling present. No deformity, effusion or lacerations.  Decreased range of motion. Tenderness present. No radial head, medial epicondyle, lateral epicondyle or olecranon process tenderness. Right wrist: Normal.      Left wrist: Normal.      Cervical back: Normal range of motion. Skin:     General: Skin is warm and dry. Capillary Refill: Capillary refill takes less than 2 seconds. Coloration: Skin is not pale. Findings: No erythema or rash. Neurological:      Mental Status: She is alert and oriented to person, place, and time. Left elbow-there is a superficial abrasion over the posterior aspect of the olecranon. Range of motion not assessed due to recent injury. No tenderness over the DRUJ    XRAY  X-ray 3 views of the left elbow from May 13, 2021 reviewed by me today in the office demonstrates age appropriate bone density throughout with a short oblique fracture through the lateral condyle of the distal humerus with mild displacement laterally and near anatomic alignment in flexion and extension, no subluxation or dislocation noted. Assessment:      Left lateral condyle distal humerus fracture      Plan:      I discussed with her today her x-ray findings. I explained to her that she does have a fracture in her left elbow. I did discuss conservative versus surgical options with her and after explaining both options she would like to proceed with nonoperative treatment. I placed her into a hinged elbow brace locked at 60 degrees of flexion. She is to wear the brace at all times except for bathing while at rest.  Avoid range of motion to the left elbow. No lifting, pushing, pulling with affected arm. Pain medication as needed. Ice as needed. Follow-up in 1 week for recheck with x-rays of the left elbow.         Chace Concepcion, DO

## 2021-05-24 ENCOUNTER — OFFICE VISIT (OUTPATIENT)
Dept: ORTHOPEDIC SURGERY | Age: 71
End: 2021-05-24

## 2021-05-24 VITALS
WEIGHT: 235 LBS | BODY MASS INDEX: 41.64 KG/M2 | OXYGEN SATURATION: 93 % | RESPIRATION RATE: 18 BRPM | HEART RATE: 81 BPM | HEIGHT: 63 IN

## 2021-05-24 DIAGNOSIS — S42.402A DISPLACED FRACTURE OF DISTAL END OF LEFT HUMERUS: Primary | ICD-10-CM

## 2021-05-24 PROCEDURE — 99024 POSTOP FOLLOW-UP VISIT: CPT | Performed by: ORTHOPAEDIC SURGERY

## 2021-05-25 ASSESSMENT — ENCOUNTER SYMPTOMS
SHORTNESS OF BREATH: 0
COLOR CHANGE: 0

## 2021-05-25 NOTE — PROGRESS NOTES
Subjective:      Patient ID: Carmenza López is a 65 y.o. female. Patient returns to the office for a follow up on her distal left humerus fracture that occurred on 5/13/21. She has remained in sling and hinged elbow brace as instructed with no changes in previously reported symptoms with pain rated at a 10/10. She has had swelling subside since her initial injury last week with continued bruising. Patient did strike the elbow on the edge of the counter today while walking towards x-rays but has had no further injury. She continues to have occasional numbness and tingling within the fingers with the majority of pain felt from lateral aspect of the forearm towards the bicep. She comes in today for a recheck of her left distal humerus fracture which occurred 2 weeks ago. She states that since I saw her last she has been wearing her elbow brace locked at 90 degrees of flexion but she continues having a deep, throbbing pain globally in the left elbow. Patient denies any new injury to the involved extremity/ joint, denies numbness or tingling in the involved extremity and denies fever or chills. Review of Systems   Constitutional: Negative for activity change, chills and fever. Respiratory: Negative for shortness of breath. Cardiovascular: Negative for chest pain. Musculoskeletal: Positive for arthralgias, joint swelling and myalgias. Negative for gait problem. Skin: Negative for color change, pallor, rash and wound. Neurological: Positive for weakness. Negative for numbness.        Past Medical History:   Diagnosis Date    Acid reflux     Angina at rest [facility]) In Past    Denies Chest Pain At This Time    Arthritis     \"Knees, Back And Hips\"    Asthma     Broken teeth     Upper    CAD (coronary artery disease)     Sees Dr. Kenya Yanes a couple of heart stents- had stress test in May 2018 all ok\"    Chronic back pain     Degenerative joint disease     Back     Diabetes mellitus [facility]) Dx 2003    GI bleed 11/2009    H/O Doppler venous ultrasound 02/11/2019    No significant insufficiency noted.  H/O echocardiogram 07/14/2014    EF 50%, low normal systolic function, no wall motion abnormalities, mild concentric hypertrophy and signs of diastolic dysfunction, mildly dilated left atrium,  no pericardial effusion, mild mitral and tricuspid regurg.  H/O echocardiogram 11/2219    EF 55-60%,  There is physiological to trace amount of pericardial fluid present.  H/O percutaneous transluminal coronary angioplasty 04/30/2019    LAD stent and PIC of restent stenosis also in LAD.  Hiatal hernia     History of complete ECG     07/20/2012=NSR, leftward axis, prob normal for age, poor r-waveprogression, inferior ST-T abnormalities, consider ischemia    History of echocardiogram 01/26/2017    Ejection fraction is visually estimated at 55%. Mild concentric left ventricular hypertrophy. Mildly dilated left atrium. Mild mitral regurgitation is present.  History of nuclear stress test 01/26/2017    cardiolite-moderate ischemia mid anteroseptal,EF70%    History of nuclear stress test 07/12/2018    Normal    Hx of 24 hour EKG monitoring 12/21/2011    brief run of SVT, otherwise noth clinically sig. arrhythmia noted    Hx of cardiovascular stress test 7/25/14 1/23/12 7/14 EF70% normal study 1/23/12=thallium stress=no scintigraphic inducible myocardial ischemia, EF 70%;  03/2011=EF 70%; 03/2010=EF 70%; 12/1997; 02/1997    Hx of cardiovascular stress test 05/04/2020    Normal stress test    Hx of CT scan     05/08/2011=CT chest with contrast=examination is moderately degraded by respiratory motion. No evidence for acute pulmonary thromboembolic disease.  Dilated main pulmonary artery suggesting elevated pulm artery pressures    Hx of Doppler ultrasound     bilateral carotid 03/11/2011= no significant stenosis present    Hx of echocardiogram     03/11/2011=mildly hypertreophied left ventricle, EF 60%, mild pulm. htn, mild aortic stenosis; 02/1997   [facility] OTHER MEDICAL     Primary Care Physician Is Dr. Bertha Araujo Hyperlipidemia     Hypertension     Hyperthyroidism     Hypothyroidism, adult     Kidney stones Last Episode In 2000's    \"Passed Kidney Stones Three Different Times\"    Left hip pain     \"for months- had hip pain\"for steroid injection 7/5/2016    Migraines     Normal cardiac stress test 05/12/2016    EF70% Normal    ORLIN (obstructive sleep apnea)     doesnt use cpap, uses o2 2l as needed at night    Post PTCA     2008, 2010, 2016    RECEIVED BLOOD TRANSFUSION 11/2009    RECEIVED BLOOD TRANSFUSION, NO REACTION TO THE BLOOD TRANSFUSION RECEIVED    Shortness of breath on exertion     Sleep apnea     NO CPAP- last sleep study done 2011    Teeth missing     Upper And Lower    Ulcer 11/2009    Stomach, GI Bleeding, Received Blood Transfusions    Urinary incontinence     \"wear a pad all the time\"    UTI (urinary tract infection) In Past     No Current Symptoms    Venous US Dup     Wears glasses     Wears partial dentures     Upper       Objective:   Physical Exam  Constitutional:       Appearance: She is well-developed. HENT:      Head: Normocephalic and atraumatic. Eyes:      Pupils: Pupils are equal, round, and reactive to light. Pulmonary:      Effort: Pulmonary effort is normal.   Musculoskeletal:         General: Swelling, tenderness and signs of injury present. No deformity. Right elbow: No swelling, deformity, effusion or lacerations. Normal range of motion. No tenderness. No radial head, medial epicondyle, lateral epicondyle or olecranon process tenderness. Left elbow: Swelling present. No deformity, effusion or lacerations. Decreased range of motion. Tenderness present. No radial head, medial epicondyle, lateral epicondyle or olecranon process tenderness. Right wrist: Normal.      Left wrist: Normal.      Cervical back: Normal range of motion. Skin:     General: Skin is warm and dry. Capillary Refill: Capillary refill takes less than 2 seconds. Coloration: Skin is not pale. Findings: No erythema or rash. Neurological:      Mental Status: She is alert and oriented to person, place, and time. Left elbow-there is a superficial abrasion over the posterior aspect of the olecranon.     XR ELBOW LEFT STANDARD    Result Date: 5/24/2021  XRAY X-ray 3 views of the left elbow obtained and reviewed by me today in the office demonstrates age appropriate bone density throughout with continued short oblique fracture through the supracondylar region of the distal humerus which begins in the lateral condyle and extends out through the medial epicondyle, there has been no new displacement or angulation compared to prior x-rays with no subluxation or dislocation noted. Impression: Stable left supracondylar distal humerus fracture. Assessment:      Left lateral condyle distal humerus fracture, 2 weeks      Plan:      I discussed with her today her x-ray findings. I explained to her that her fracture remains in good alignment and should heal with conservative treatment. I also again discussed surgical option for quicker return to range of motion for the left elbow. At this point she would like to avoid surgery and continue with conservative treatment. I did discuss the possibility of loss of motion. I opened up her elbow brace from 70 to 100 degrees of range of motion. She is to wear the brace at all times except for bathing while at rest.  Avoid range of motion to the left elbow. No lifting, pushing, pulling with affected arm. Pain medication as needed. Ice as needed. Follow-up in 2 week for recheck with x-rays of the left elbow at which point we will increase her elbow range of motion to full.         Chace 97, DO

## 2021-05-26 DIAGNOSIS — S42.402A DISPLACED FRACTURE OF DISTAL END OF LEFT HUMERUS: Primary | ICD-10-CM

## 2021-05-27 RX ORDER — OXYCODONE HYDROCHLORIDE AND ACETAMINOPHEN 5; 325 MG/1; MG/1
1 TABLET ORAL EVERY 6 HOURS PRN
Qty: 25 TABLET | Refills: 0 | Status: SHIPPED | OUTPATIENT
Start: 2021-05-27 | End: 2021-06-03

## 2021-06-07 ENCOUNTER — OFFICE VISIT (OUTPATIENT)
Dept: ORTHOPEDIC SURGERY | Age: 71
End: 2021-06-07

## 2021-06-07 VITALS
HEIGHT: 63 IN | RESPIRATION RATE: 15 BRPM | BODY MASS INDEX: 41.64 KG/M2 | OXYGEN SATURATION: 95 % | WEIGHT: 235 LBS | HEART RATE: 68 BPM

## 2021-06-07 DIAGNOSIS — S42.402A DISPLACED FRACTURE OF DISTAL END OF LEFT HUMERUS: Primary | ICD-10-CM

## 2021-06-07 PROCEDURE — 99024 POSTOP FOLLOW-UP VISIT: CPT | Performed by: ORTHOPAEDIC SURGERY

## 2021-06-07 RX ORDER — DOXYCYCLINE HYCLATE 100 MG/1
CAPSULE ORAL
COMMUNITY
Start: 2021-05-10 | End: 2022-01-21

## 2021-06-07 RX ORDER — FAMOTIDINE 20 MG/1
TABLET, FILM COATED ORAL
COMMUNITY
Start: 2021-05-06

## 2021-06-07 RX ORDER — OMEPRAZOLE 20 MG/1
20 CAPSULE, DELAYED RELEASE ORAL DAILY
COMMUNITY
End: 2022-01-21

## 2021-06-07 RX ORDER — HYDROCODONE BITARTRATE AND ACETAMINOPHEN 5; 325 MG/1; MG/1
1 TABLET ORAL EVERY 8 HOURS PRN
Qty: 20 TABLET | Refills: 0 | Status: SHIPPED | OUTPATIENT
Start: 2021-06-07 | End: 2021-06-14

## 2021-06-07 RX ORDER — BLOOD SUGAR DIAGNOSTIC
STRIP MISCELLANEOUS
COMMUNITY
Start: 2021-04-06

## 2021-06-07 RX ORDER — LANCETS 33 GAUGE
EACH MISCELLANEOUS
COMMUNITY
Start: 2021-05-01

## 2021-06-07 NOTE — PATIENT INSTRUCTIONS
Continue range of motion exercises as instructed. Ice and elevate as needed. Tylenol or Motrin for pain. Follow up in 6 weeks  Use brace when out in public  May remove brace at home   Still no pushing pulling or lifting with the left arm  May open brace up to full ROM        After today visits you will receive a survey about your care: Please let us know how we did today at your visit. Thank you [address] [place]

## 2021-06-07 NOTE — PROGRESS NOTES
Patient returns to the office today for FU of the left humerus DOI 5/13/21. Pt states pain today in the elbow is a 6/10 pt states she is having some increase pain in the left wrist today. Pt states she wrist is very achy. Pt states she has been removing her sling and brace a rest. Pt states she is still using ice daily to help with the swelling in the elbow.  Pt denies any new injury

## 2021-06-08 ASSESSMENT — ENCOUNTER SYMPTOMS
SHORTNESS OF BREATH: 0
COLOR CHANGE: 0

## 2021-06-08 NOTE — PROGRESS NOTES
Subjective:      Patient ID: Tavo Anderson is a 65 y.o. female. Patient returns to the office today for FU of the left humerus DOI 5/13/21. Pt states pain today in the elbow is a 6/10 pt states she is having some increase pain in the left wrist today. Pt states she wrist is very achy. Pt states she has been removing her sling and brace a rest. Pt states she is still using ice daily to help with the swelling in the elbow. Pt denies any new injury       She comes in today for a recheck of her left distal humerus fracture which occurred for 4 weeks ago. She states that since I saw her last she has been wearing her elbow brace with partial range of motion. She states that she continues dealing with stiffness and deep, throbbing pain globally in the left elbow. Patient denies any new injury to the involved extremity/ joint, denies numbness or tingling in the involved extremity and denies fever or chills. Review of Systems   Constitutional: Negative for activity change, chills and fever. Respiratory: Negative for shortness of breath. Cardiovascular: Negative for chest pain. Musculoskeletal: Positive for arthralgias, joint swelling and myalgias. Negative for gait problem. Skin: Negative for color change, pallor, rash and wound. Neurological: Positive for weakness. Negative for numbness. Past Medical History:   Diagnosis Date    Acid reflux     Angina at rest [facility]) In Past    Denies Chest Pain At This Time    Arthritis     \"Knees, Back And Hips\"    Asthma     Broken teeth     Upper    CAD (coronary artery disease)     Sees Dr. Jeannine Ware a couple of heart stents- had stress test in May 2018 all ok\"    Chronic back pain     Degenerative joint disease     Back     Diabetes mellitus ([facility] [address] Dx 2003    GI bleed 11/2009    H/O Doppler venous ultrasound 02/11/2019    No significant insufficiency noted.     H/O echocardiogram 07/14/2014    EF 50%, low normal systolic function, no wall motion abnormalities, mild concentric hypertrophy and signs of diastolic dysfunction, mildly dilated left atrium,  no pericardial effusion, mild mitral and tricuspid regurg.  H/O echocardiogram 11/2219    EF 55-60%,  There is physiological to trace amount of pericardial fluid present.  H/O percutaneous transluminal coronary angioplasty 04/30/2019    LAD stent and PIC of restent stenosis also in LAD.  Hiatal hernia     History of complete ECG     07/20/2012=NSR, leftward axis, prob normal for age, poor r-waveprogression, inferior ST-T abnormalities, consider ischemia    History of echocardiogram 01/26/2017    Ejection fraction is visually estimated at 55%. Mild concentric left ventricular hypertrophy. Mildly dilated left atrium. Mild mitral regurgitation is present.  History of nuclear stress test 01/26/2017    cardiolite-moderate ischemia mid anteroseptal,EF70%    History of nuclear stress test 07/12/2018    Normal    Hx of 24 hour EKG monitoring 12/21/2011    brief run of SVT, otherwise noth clinically sig. arrhythmia noted    Hx of cardiovascular stress test 7/25/14 1/23/12 7/14 EF70% normal study 1/23/12=thallium stress=no scintigraphic inducible myocardial ischemia, EF 70%;  03/2011=EF 70%; 03/2010=EF 70%; 12/1997; 02/1997    Hx of cardiovascular stress test 05/04/2020    Normal stress test    Hx of CT scan     05/08/2011=CT chest with contrast=examination is moderately degraded by respiratory motion. No evidence for acute pulmonary thromboembolic disease.  Dilated main pulmonary artery suggesting elevated pulm artery pressures    Hx of Doppler ultrasound     bilateral carotid 03/11/2011= no significant stenosis present    Hx of echocardiogram     03/11/2011=mildly hypertreophied left ventricle, EF 60%, mild pulm. htn, mild aortic stenosis; 02/1997   [facility] OTHER MEDICAL     Primary Care Physician Is Dr. Félix De La Torre Hyperlipidemia     Hypertension     Hyperthyroidism     Hypothyroidism, adult     Kidney stones Last Episode In 2000's    \"Passed Kidney Stones Three Different Times\"    Left hip pain     \"for months- had hip pain\"for steroid injection 7/5/2016    Migraines     Normal cardiac stress test 05/12/2016    EF70% Normal    ORLIN (obstructive sleep apnea)     doesnt use cpap, uses o2 2l as needed at night    Post PTCA     2008, 2010, 2016    RECEIVED BLOOD TRANSFUSION 11/2009    RECEIVED BLOOD TRANSFUSION, NO REACTION TO THE BLOOD TRANSFUSION RECEIVED    Shortness of breath on exertion     Sleep apnea     NO CPAP- last sleep study done 2011    Teeth missing     Upper And Lower    Ulcer 11/2009    Stomach, GI Bleeding, Received Blood Transfusions    Urinary incontinence     \"wear a pad all the time\"    UTI (urinary tract infection) In Past     No Current Symptoms    Venous US Dup     Wears glasses     Wears partial dentures     Upper       Objective:   Physical Exam  Constitutional:       Appearance: She is well-developed. HENT:      Head: Normocephalic and atraumatic. Eyes:      Pupils: Pupils are equal, round, and reactive to light. Pulmonary:      Effort: Pulmonary effort is normal.   Musculoskeletal:         General: Swelling, tenderness and signs of injury present. No deformity. Right elbow: No swelling, deformity, effusion or lacerations. Normal range of motion. No tenderness. No radial head, medial epicondyle, lateral epicondyle or olecranon process tenderness. Left elbow: Swelling present. No deformity, effusion or lacerations. Decreased range of motion. Tenderness present. No radial head, medial epicondyle, lateral epicondyle or olecranon process tenderness. Right wrist: Normal.      Left wrist: Normal.      Cervical back: Normal range of motion. Skin:     General: Skin is warm and dry. Capillary Refill: Capillary refill takes less than 2 seconds. Coloration: Skin is not pale. Findings: No erythema or rash. Neurological:      Mental Status: She is alert and oriented to person, place, and time. Left elbow-Skin intact with no erythema, ecchymosis or lacerations present.     XR ELBOW LEFT STANDARD    Result Date: 6/7/2021  XRAY X-ray 3 views of the left elbow obtained and reviewed by me today in the office demonstrates age appropriate bone density throughout with continued mildly displaced supracondylar distal humerus fracture originating in the lateral epicondyle and extending transversely, there has been no new displacement or angulation compared to prior x-rays, there has been abundant interval callus formation compared to prior x-rays, no subluxation or dislocation noted. Impression: Stable left supracondylar distal humerus fracture with routine healing. Assessment:      Left lateral condyle distal humerus fracture, 4 weeks      Plan:      I discussed with her today her x-ray findings. I explained to her that her fracture remains in good alignment and is beginning to heal very well. At this point I opened up her elbow brace to full range of motion to be worn during at risk activities or when walking on unlevel ground. She can remove the brace and begin working on flexion and extension as well as rotation exercises as instructed and as tolerated. She can begin using the left arm for daily activities. No lifting, pushing, pulling with affected arm. Ice as needed. Follow-up in 6 week for recheck with x-rays of the left elbow. I did give her 1 last refill on her Norco to be taken as needed.         Chace 97, DO

## 2021-06-24 ENCOUNTER — TELEPHONE (OUTPATIENT)
Dept: ORTHOPEDIC SURGERY | Age: 71
End: 2021-06-24

## 2021-06-24 NOTE — TELEPHONE ENCOUNTER
Patient called states she is going out of [place] for a birth of a grand baby and would like a few pain medication to get her through the travel and in case anyone bumps her. Patient also stated that she is doing home PT and at the end she hurts a bit. I informed the patient of your last office visit on 06/07/2021 to take Tylenol or Motrin for the pain, she states has been doing this with no relief. We gave her a script of Hydrocodone on 06/07 and she just ran out this morning. [facility] on [address] in [facility].   Ashley Aly

## 2021-07-19 ENCOUNTER — OFFICE VISIT (OUTPATIENT)
Dept: ORTHOPEDIC SURGERY | Age: 71
End: 2021-07-19

## 2021-07-19 VITALS
HEART RATE: 84 BPM | OXYGEN SATURATION: 97 % | RESPIRATION RATE: 15 BRPM | HEIGHT: 63 IN | BODY MASS INDEX: 41.64 KG/M2 | WEIGHT: 235 LBS

## 2021-07-19 DIAGNOSIS — S42.402A DISPLACED FRACTURE OF DISTAL END OF LEFT HUMERUS: ICD-10-CM

## 2021-07-19 DIAGNOSIS — M25.522 LEFT ELBOW PAIN: Primary | ICD-10-CM

## 2021-07-19 PROCEDURE — 99024 POSTOP FOLLOW-UP VISIT: CPT | Performed by: ORTHOPAEDIC SURGERY

## 2021-07-19 NOTE — PROGRESS NOTES
Patient presents to the office today for FU of the left distal humerus DOI 5/13/21. Pt states pain today is a 5/10 pain will increase to a 7/10 when she hits her elbow pain will increase. She is still having difficult time with extending her left arm fully.  Pt denies any new injury or accident

## 2021-07-19 NOTE — PATIENT INSTRUCTIONS
May start weightbearing as tolerated   Continue range of motion exercises as instructed. Ice and elevate as needed. Tylenol or Motrin for pain.   discontinue the brace   Follow up 2 months

## 2021-07-21 ASSESSMENT — ENCOUNTER SYMPTOMS
COLOR CHANGE: 0
SHORTNESS OF BREATH: 0

## 2021-07-21 NOTE — PROGRESS NOTES
Subjective:      Patient ID: Suma Gimenez is a 65 y.o. female. Patient presents to the office today for FU of the left distal humerus DOI 5/13/21. Pt states pain today is a 5/10 pain will increase to a 7/10 when she hits her elbow pain will increase. She is still having difficult time with extending her left arm fully. Pt denies any new injury or accident      She comes in today for a 2-month recheck of her left distal humerus fracture which we are treating conservatively. She states that since I saw her last she has been wearing her elbow brace with full range of motion. She states that she continues dealing with stiffness and deep, throbbing pain globally in the left elbow. Patient denies any new injury to the involved extremity/ joint, denies numbness or tingling in the involved extremity and denies fever or chills. Review of Systems   Constitutional: Negative for activity change, chills and fever. Respiratory: Negative for shortness of breath. Cardiovascular: Negative for chest pain. Musculoskeletal: Positive for arthralgias, joint swelling and myalgias. Negative for gait problem. Skin: Negative for color change, pallor, rash and wound. Neurological: Positive for weakness. Negative for numbness. Past Medical History:   Diagnosis Date    Acid reflux     Angina at rest [facility]) In Past    Denies Chest Pain At This Time    Arthritis     \"Knees, Back And Hips\"    Asthma     Broken teeth     Upper    CAD (coronary artery disease)     Sees Dr. Beto Lindo a couple of heart stents- had stress test in May 2018 all ok\"    Chronic back pain     Degenerative joint disease     Back     Diabetes mellitus ([facility] [address] Dx 2003    GI bleed 11/2009    H/O Doppler venous ultrasound 02/11/2019    No significant insufficiency noted.     H/O echocardiogram 07/14/2014    EF 50%, low normal systolic function, no wall motion abnormalities, mild concentric hypertrophy and signs of diastolic Three Different Times\"    Left hip pain     \"for months- had hip pain\"for steroid injection 7/5/2016    Migraines     Normal cardiac stress test 05/12/2016    EF70% Normal    ORLIN (obstructive sleep apnea)     doesnt use cpap, uses o2 2l as needed at night    Post PTCA     2008, 2010, 2016    RECEIVED BLOOD TRANSFUSION 11/2009    RECEIVED BLOOD TRANSFUSION, NO REACTION TO THE BLOOD TRANSFUSION RECEIVED    Shortness of breath on exertion     Sleep apnea     NO CPAP- last sleep study done 2011    Teeth missing     Upper And Lower    Ulcer 11/2009    Stomach, GI Bleeding, Received Blood Transfusions    Urinary incontinence     \"wear a pad all the time\"    UTI (urinary tract infection) In Past     No Current Symptoms    Venous US Dup     Wears glasses     Wears partial dentures     Upper       Objective:   Physical Exam  Constitutional:       Appearance: She is well-developed. HENT:      Head: Normocephalic and atraumatic. Eyes:      Pupils: Pupils are equal, round, and reactive to light. Pulmonary:      Effort: Pulmonary effort is normal.   Musculoskeletal:         General: Tenderness present. No swelling, deformity or signs of injury. Right elbow: No swelling, deformity, effusion or lacerations. Normal range of motion. No tenderness. No radial head, medial epicondyle, lateral epicondyle or olecranon process tenderness. Left elbow: No swelling, deformity, effusion or lacerations. Decreased range of motion. Tenderness present. No radial head, medial epicondyle, lateral epicondyle or olecranon process tenderness. Right wrist: Normal.      Left wrist: Normal.      Cervical back: Normal range of motion. Skin:     General: Skin is warm and dry. Capillary Refill: Capillary refill takes less than 2 seconds. Coloration: Skin is not pale. Findings: No erythema or rash. Neurological:      Mental Status: She is alert and oriented to person, place, and time.      Left elbow-Skin intact with no erythema, ecchymosis or lacerations present. Flexion 120  Extension 5    XR ELBOW LEFT STANDARD    Result Date: 7/19/2021  XRAY X-ray 3 views of the left elbow obtained and reviewed by me today in the office demonstrates age appropriate bone density throughout with continued minimally displaced long oblique fracture through the lateral condyle of the distal humerus, there has been no new displacement or angulation compared to prior x-rays with normal interval callus summation, no subluxation or dislocation noted. Impression: Stable left supracondylar distal humerus fracture with routine healing. Assessment:      Left lateral condyle distal humerus fracture, 2 months      Plan:      I discussed with her today her x-ray findings. I explained to her that her fracture remains in good alignment and is healing very well. At this point she can discontinue the brace. She can resume all activities with no restrictions with the left arm. I explained to her that she will always have some loss of motion in the left elbow as a result of this injury. I did offer her formal occupational therapy but she declined and would like to do this on her own. Continue weight-bearing as tolerated. Continue range of motion exercises as instructed. Ice and elevate as needed. Tylenol or Motrin for pain. Follow up in 2 months for recheck of range of motion and x-rays of the left elbow.             Chace Concepcion, DO

## 2021-08-17 DIAGNOSIS — R06.09 DYSPNEA ON EXERTION: ICD-10-CM

## 2021-08-17 DIAGNOSIS — E66.9 RESTRICTIVE LUNG DISEASE SECONDARY TO OBESITY: ICD-10-CM

## 2021-08-17 DIAGNOSIS — J98.4 RESTRICTIVE LUNG DISEASE SECONDARY TO OBESITY: ICD-10-CM

## 2021-08-18 RX ORDER — ALBUTEROL SULFATE 90 UG/1
2 AEROSOL, METERED RESPIRATORY (INHALATION) EVERY 6 HOURS PRN
Qty: 76.5 G | Refills: 3 | Status: SHIPPED | OUTPATIENT
Start: 2021-08-18 | End: 2022-07-15

## 2021-08-30 ENCOUNTER — OFFICE VISIT (OUTPATIENT)
Dept: ORTHOPEDIC SURGERY | Age: 71
End: 2021-08-30
Payer: MEDICARE

## 2021-08-30 VITALS
HEART RATE: 74 BPM | BODY MASS INDEX: 41.41 KG/M2 | RESPIRATION RATE: 18 BRPM | WEIGHT: 225 LBS | HEIGHT: 62 IN | OXYGEN SATURATION: 97 %

## 2021-08-30 DIAGNOSIS — Z01.818 PRE-OP EXAMINATION: Primary | ICD-10-CM

## 2021-08-30 DIAGNOSIS — M16.12 PRIMARY OSTEOARTHRITIS OF LEFT HIP: Primary | ICD-10-CM

## 2021-08-30 DIAGNOSIS — M16.12 PRIMARY OSTEOARTHRITIS OF LEFT HIP: ICD-10-CM

## 2021-08-30 PROCEDURE — 1123F ACP DISCUSS/DSCN MKR DOCD: CPT | Performed by: ORTHOPAEDIC SURGERY

## 2021-08-30 PROCEDURE — 3017F COLORECTAL CA SCREEN DOC REV: CPT | Performed by: ORTHOPAEDIC SURGERY

## 2021-08-30 PROCEDURE — G8399 PT W/DXA RESULTS DOCUMENT: HCPCS | Performed by: ORTHOPAEDIC SURGERY

## 2021-08-30 PROCEDURE — 1090F PRES/ABSN URINE INCON ASSESS: CPT | Performed by: ORTHOPAEDIC SURGERY

## 2021-08-30 PROCEDURE — 1036F TOBACCO NON-USER: CPT | Performed by: ORTHOPAEDIC SURGERY

## 2021-08-30 PROCEDURE — 4040F PNEUMOC VAC/ADMIN/RCVD: CPT | Performed by: ORTHOPAEDIC SURGERY

## 2021-08-30 PROCEDURE — G8427 DOCREV CUR MEDS BY ELIG CLIN: HCPCS | Performed by: ORTHOPAEDIC SURGERY

## 2021-08-30 PROCEDURE — 99214 OFFICE O/P EST MOD 30 MIN: CPT | Performed by: ORTHOPAEDIC SURGERY

## 2021-08-30 PROCEDURE — G8417 CALC BMI ABV UP PARAM F/U: HCPCS | Performed by: ORTHOPAEDIC SURGERY

## 2021-08-30 ASSESSMENT — ENCOUNTER SYMPTOMS
COLOR CHANGE: 0
CHEST TIGHTNESS: 0
BACK PAIN: 0

## 2021-08-30 NOTE — PROGRESS NOTES
Subjective:      Patient ID: Susie Kaiser is a 65 y.o. female. Patient states that she has had L Hip pain for a few years. Injections have helped temporarily. Was told by Dr. Devan Ruth that she would need a replacement at some point. Patient says pain starts in joint and wraps around to groin and goes into thigh. She comes in today for evaluation of her left hip pain. She states that she has been dealing with sharp, stabbing pain in her left hip and groin over the past several years but it has progressively worsened over the past 6 months. She states that now she is having difficulty with her daily activities or walking due to the pain. Patient denies any new injury to the involved extremity/ joint, denies numbness or tingling in the involved extremity and denies fever or chills. Review of Systems   Constitutional: Negative for activity change, chills and fever. Respiratory: Negative for chest tightness. Cardiovascular: Negative for chest pain. Musculoskeletal: Positive for arthralgias, gait problem and myalgias. Negative for back pain and joint swelling. Skin: Negative for color change, pallor, rash and wound. Neurological: Negative for weakness and numbness. Past Medical History:   Diagnosis Date    Acid reflux     Angina at rest [facility]) In Past    Denies Chest Pain At This Time    Arthritis     \"Knees, Back And Hips\"    Asthma     Broken teeth     Upper    CAD (coronary artery disease)     Sees Dr. Avril Johnson a couple of heart stents- had stress test in May 2018 all ok\"    Chronic back pain     Degenerative joint disease     Back     [facility]    GI bleed 11/2009    H/O Doppler venous ultrasound 02/11/2019    No significant insufficiency noted.     H/O echocardiogram 07/14/2014    EF 50%, low normal systolic function, no wall motion abnormalities, mild concentric hypertrophy and signs of diastolic dysfunction, mildly dilated left atrium,  no pericardial effusion, mild mitral and tricuspid regurg.  H/O echocardiogram 11/2219    EF 55-60%,  There is physiological to trace amount of pericardial fluid present.  H/O percutaneous transluminal coronary angioplasty 04/30/2019    LAD stent and PIC of restent stenosis also in LAD.  Hiatal hernia     History of complete ECG     07/20/2012=NSR, leftward axis, prob normal for age, poor r-waveprogression, inferior ST-T abnormalities, consider ischemia    History of echocardiogram 01/26/2017    Ejection fraction is visually estimated at 55%. Mild concentric left ventricular hypertrophy. Mildly dilated left atrium. Mild mitral regurgitation is present.  History of nuclear stress test 01/26/2017    cardiolite-moderate ischemia mid anteroseptal,EF70%    History of nuclear stress test 07/12/2018    Normal    Hx of 24 hour EKG monitoring 12/21/2011    brief run of SVT, otherwise noth clinically sig. arrhythmia noted    Hx of cardiovascular stress test 7/25/14 1/23/12 7/14 EF70% normal study 1/23/12=thallium stress=no scintigraphic inducible myocardial ischemia, EF 70%;  03/2011=EF 70%; 03/2010=EF 70%; 12/1997; 02/1997    Hx of cardiovascular stress test 05/04/2020    Normal stress test    Hx of CT scan     05/08/2011=CT chest with contrast=examination is moderately degraded by respiratory motion. No evidence for acute pulmonary thromboembolic disease.  Dilated main pulmonary artery suggesting elevated pulm artery pressures    Hx of Doppler ultrasound     bilateral carotid 03/11/2011= no significant stenosis present    Hx of echocardiogram     03/11/2011=mildly hypertreophied left ventricle, EF 60%, mild pulm. htn, mild aortic stenosis; 02/1997   [facility] OTHER MEDICAL     Primary Care Physician Is Dr. Elsa Tracy Hyperlipidemia     Hypertension     Hyperthyroidism     Hypothyroidism, adult     Kidney stones Last Episode In 2000's    \"Passed Kidney Stones Three Different Times\"    Left hip pain \"for months- had hip pain\"for steroid injection 7/5/2016    Migraines     Normal cardiac stress test 05/12/2016    EF70% Normal    ORLIN (obstructive sleep apnea)     doesnt use cpap, uses o2 2l as needed at night    Post PTCA     2008, 2010, 2016    RECEIVED BLOOD TRANSFUSION 11/2009    RECEIVED BLOOD TRANSFUSION, NO REACTION TO THE BLOOD TRANSFUSION RECEIVED    Shortness of breath on exertion     Sleep apnea     NO CPAP- last sleep study done 2011    Teeth missing     Upper And Lower    Ulcer 11/2009    Stomach, GI Bleeding, Received Blood Transfusions    Urinary incontinence     \"wear a pad all the time\"    UTI (urinary tract infection) In Past     No Current Symptoms    Venous US Dup     Wears glasses     Wears partial dentures     Upper       Objective:   Physical Exam  Constitutional:       Appearance: She is well-developed. HENT:      Head: Normocephalic. Eyes:      Pupils: Pupils are equal, round, and reactive to light. Pulmonary:      Effort: Pulmonary effort is normal.   Musculoskeletal:         General: Tenderness present. No deformity. Cervical back: Normal range of motion. Right hip: No deformity, lacerations, tenderness, bony tenderness or crepitus. Normal range of motion. Normal strength. Left hip: Tenderness and bony tenderness present. No deformity, lacerations or crepitus. Decreased range of motion. Normal strength. Right knee: Normal.      Left knee: Normal.   Skin:     General: Skin is warm and dry. Capillary Refill: Capillary refill takes less than 2 seconds. Coloration: Skin is not pale. Findings: No erythema or rash. Neurological:      Mental Status: She is alert and oriented to person, place, and time. Left hip-Skin intact with no erythema, ecchymosis or lacerations present. Severe pain in the left hip and groin with range of motion of the left hip.     XRAY  X-ray 2 views of the left hip from May 13, 2021 reviewed by me today in the office demonstrates age appropriate bone density throughout with moderate to severe degenerative changes with joint space collapse, no subluxation or dislocation noted, incidentally is noted that there is a well-positioned right total hip arthroplasty with no signs of loosening or wear, no acute osseous abnormalities. Assessment:      Left hip DJD, moderate to severe      Plan:      I discussed with her today her x-ray findings. I explained to her that she does have severe arthritis in the left hip. At this point given her persistent worsening symptoms despite conservative treatment and with her x-ray findings I recommend surgical treatment. I  had a lengthy discussion with her today in regards to left total hip replacement surgery. I explained risks, benefits, possible complications of the procedure and answered all questions for the patient. I explained postoperative rehabilitation protocol and expectations with the patient today. The patient understands and consents to the procedure. Patient will follow up with their primary care physician prior to surgical treatment for preoperative clearance. We will schedule surgery at soonest convenience. Continue weight-bearing as tolerated. Continue range of motion exercises as instructed. Ice and elevate as needed. Tylenol or Motrin for pain. Follow up in 3 weeks postop. She would like to spend the night in the hospital after her surgery.           Chace Concepcion, DO

## 2021-08-30 NOTE — PATIENT INSTRUCTIONS
Continue weight-bearing as tolerated. Continue range of motion exercises as instructed. Ice and elevate as needed. Tylenol or Motrin for pain. We will schedule surgery at soonest convenience. If you have any questions regarding your surgery please call our office and ask to speak with Barbie.  [phone]

## 2021-08-31 ENCOUNTER — TELEPHONE (OUTPATIENT)
Dept: CARDIOLOGY CLINIC | Age: 71
End: 2021-08-31

## 2021-09-03 ENCOUNTER — TELEPHONE (OUTPATIENT)
Dept: ORTHOPEDIC SURGERY | Age: 71
End: 2021-09-03

## 2021-09-03 NOTE — TELEPHONE ENCOUNTER
Called patient's insurance spoke with Kaih Reese prior Yessy Vasquez is required for cpt code [id number]  Pending at this time  Ref# [id number]

## 2021-09-20 ENCOUNTER — OFFICE VISIT (OUTPATIENT)
Dept: ORTHOPEDIC SURGERY | Age: 71
End: 2021-09-20
Payer: MEDICARE

## 2021-09-20 VITALS
HEART RATE: 89 BPM | BODY MASS INDEX: 41.41 KG/M2 | HEIGHT: 62 IN | WEIGHT: 225 LBS | RESPIRATION RATE: 15 BRPM | OXYGEN SATURATION: 96 %

## 2021-09-20 DIAGNOSIS — S42.402A DISPLACED FRACTURE OF DISTAL END OF LEFT HUMERUS: Primary | ICD-10-CM

## 2021-09-20 PROCEDURE — 99212 OFFICE O/P EST SF 10 MIN: CPT | Performed by: ORTHOPAEDIC SURGERY

## 2021-09-20 PROCEDURE — G8417 CALC BMI ABV UP PARAM F/U: HCPCS | Performed by: ORTHOPAEDIC SURGERY

## 2021-09-20 PROCEDURE — 1090F PRES/ABSN URINE INCON ASSESS: CPT | Performed by: ORTHOPAEDIC SURGERY

## 2021-09-20 PROCEDURE — 3017F COLORECTAL CA SCREEN DOC REV: CPT | Performed by: ORTHOPAEDIC SURGERY

## 2021-09-20 PROCEDURE — 4040F PNEUMOC VAC/ADMIN/RCVD: CPT | Performed by: ORTHOPAEDIC SURGERY

## 2021-09-20 PROCEDURE — 1123F ACP DISCUSS/DSCN MKR DOCD: CPT | Performed by: ORTHOPAEDIC SURGERY

## 2021-09-20 PROCEDURE — G8399 PT W/DXA RESULTS DOCUMENT: HCPCS | Performed by: ORTHOPAEDIC SURGERY

## 2021-09-20 PROCEDURE — G8428 CUR MEDS NOT DOCUMENT: HCPCS | Performed by: ORTHOPAEDIC SURGERY

## 2021-09-20 PROCEDURE — 1036F TOBACCO NON-USER: CPT | Performed by: ORTHOPAEDIC SURGERY

## 2021-09-20 NOTE — PROGRESS NOTES
Patient presents to the office today for fu of the left distal humerus fx DOI 5/13/21. Patient states pain today is a 3/10. Pt states that she is having stiffness in the left elbow.  Pt states at times she will have some sharp stabbing pain in the left elbow

## 2021-09-21 ASSESSMENT — ENCOUNTER SYMPTOMS
SHORTNESS OF BREATH: 0
COLOR CHANGE: 0

## 2021-09-21 NOTE — PROGRESS NOTES
Subjective:      Patient ID: Milagros Lopez is a 65 y.o. female. Patient presents to the office today for fu of the left distal humerus fx DOI 5/13/21. Patient states pain today is a 3/10. Pt states that she is having stiffness in the left elbow. Pt states at times she will have some sharp stabbing pain in the left elbow      She comes in today for a 4 -month recheck of her left distal humerus fracture which we are treating conservatively. She states that she has been working on range of motion exercises and overall she is not having much pain in the elbow but does continue to have some stiffness but her range of motion has significantly improved. Patient denies any new injury to the involved extremity/ joint, denies numbness or tingling in the involved extremity and denies fever or chills. Review of Systems   Constitutional: Negative for activity change, chills and fever. Respiratory: Negative for shortness of breath. Cardiovascular: Negative for chest pain. Musculoskeletal: Negative for arthralgias, gait problem, joint swelling and myalgias. Skin: Negative for color change, pallor, rash and wound. Neurological: Negative for weakness and numbness. Past Medical History:   Diagnosis Date    Acid reflux     Angina at rest [facility]) In Past    Denies Chest Pain At This Time    Arthritis     \"Knees, Back And Hips\"    Asthma     Broken teeth     Upper    CAD (coronary artery disease)     Sees Dr. Jenny Springer a couple of heart stents- had stress test in May 2018 all ok\"    Chronic back pain     Degenerative joint disease     Back     Diabetes mellitus ([facility] [address] Dx 2003    GI bleed 11/2009    H/O Doppler venous ultrasound 02/11/2019    No significant insufficiency noted.     H/O echocardiogram 07/14/2014    EF 50%, low normal systolic function, no wall motion abnormalities, mild concentric hypertrophy and signs of diastolic dysfunction, mildly dilated left atrium,  no pericardial effusion, mild mitral and tricuspid regurg.  H/O echocardiogram 11/2219    EF 55-60%,  There is physiological to trace amount of pericardial fluid present.  H/O percutaneous transluminal coronary angioplasty 04/30/2019    LAD stent and PIC of restent stenosis also in LAD.  Hiatal hernia     History of complete ECG     07/20/2012=NSR, leftward axis, prob normal for age, poor r-waveprogression, inferior ST-T abnormalities, consider ischemia    History of echocardiogram 01/26/2017    Ejection fraction is visually estimated at 55%. Mild concentric left ventricular hypertrophy. Mildly dilated left atrium. Mild mitral regurgitation is present.  History of nuclear stress test 01/26/2017    cardiolite-moderate ischemia mid anteroseptal,EF70%    History of nuclear stress test 07/12/2018    Normal    Hx of 24 hour EKG monitoring 12/21/2011    brief run of SVT, otherwise noth clinically sig. arrhythmia noted    Hx of cardiovascular stress test 7/25/14 1/23/12 7/14 EF70% normal study 1/23/12=thallium stress=no scintigraphic inducible myocardial ischemia, EF 70%;  03/2011=EF 70%; 03/2010=EF 70%; 12/1997; 02/1997    Hx of cardiovascular stress test 05/04/2020    Normal stress test    Hx of CT scan     05/08/2011=CT chest with contrast=examination is moderately degraded by respiratory motion. No evidence for acute pulmonary thromboembolic disease.  Dilated main pulmonary artery suggesting elevated pulm artery pressures    Hx of Doppler ultrasound     bilateral carotid 03/11/2011= no significant stenosis present    Hx of echocardiogram     03/11/2011=mildly hypertreophied left ventricle, EF 60%, mild pulm. htn, mild aortic stenosis; 02/1997   [facility] OTHER MEDICAL     Primary Care Physician Is Dr. Jessica Adam Hyperlipidemia     Hypertension     Hyperthyroidism     Hypothyroidism, adult     Kidney stones Last Episode In 2000's    \"Passed Kidney Stones Three Different Times\"    Left hip pain     \"for months- had hip pain\"for steroid injection 7/5/2016    Migraines     Normal cardiac stress test 05/12/2016    EF70% Normal    ORLIN (obstructive sleep apnea)     doesnt use cpap, uses o2 2l as needed at night    Post PTCA     2008, 2010, 2016    RECEIVED BLOOD TRANSFUSION 11/2009    RECEIVED BLOOD TRANSFUSION, NO REACTION TO THE BLOOD TRANSFUSION RECEIVED    Shortness of breath on exertion     Sleep apnea     NO CPAP- last sleep study done 2011    Teeth missing     Upper And Lower    Ulcer 11/2009    Stomach, GI Bleeding, Received Blood Transfusions    Urinary incontinence     \"wear a pad all the time\"    UTI (urinary tract infection) In Past     No Current Symptoms    Venous US Dup     Wears glasses     Wears partial dentures     Upper       Objective:   Physical Exam  Constitutional:       Appearance: She is well-developed. HENT:      Head: Normocephalic and atraumatic. Eyes:      Pupils: Pupils are equal, round, and reactive to light. Pulmonary:      Effort: Pulmonary effort is normal.   Musculoskeletal:         General: No swelling, tenderness, deformity or signs of injury. Right elbow: No swelling, deformity, effusion or lacerations. Normal range of motion. No tenderness. No radial head, medial epicondyle, lateral epicondyle or olecranon process tenderness. Left elbow: No swelling, deformity, effusion or lacerations. Decreased range of motion. No radial head, medial epicondyle, lateral epicondyle or olecranon process tenderness. Right wrist: Normal.      Left wrist: Normal.      Cervical back: Normal range of motion. Skin:     General: Skin is warm and dry. Capillary Refill: Capillary refill takes less than 2 seconds. Coloration: Skin is not pale. Findings: No erythema or rash. Neurological:      Mental Status: She is alert and oriented to person, place, and time. Left elbow-Skin intact with no erythema, ecchymosis or lacerations present.   Flexion 140  Extension

## 2021-09-29 ENCOUNTER — HOSPITAL ENCOUNTER (OUTPATIENT)
Age: 71
Discharge: HOME | End: 2021-09-29
Payer: MEDICARE

## 2021-09-29 LAB
ALBUMIN SERPL-MCNC: 4 GM/DL (ref 3.4–5)
ALP BLD-CCNC: 83 IU/L (ref 40–128)
ALT SERPL-CCNC: 27 U/L (ref 10–40)
ANION GAP SERPL CALCULATED.3IONS-SCNC: 12 MMOL/L (ref 4–16)
AST SERPL-CCNC: 24 IU/L (ref 15–37)
BACTERIA: ABNORMAL /HPF
BASOPHILS ABSOLUTE: 0.1 K/CU MM
BASOPHILS RELATIVE PERCENT: 0.8 % (ref 0–1)
BILIRUB SERPL-MCNC: 0.7 MG/DL (ref 0–1)
BILIRUBIN URINE: NEGATIVE MG/DL
BLOOD, URINE: NEGATIVE
BUN BLDV-MCNC: 13 MG/DL (ref 6–23)
CALCIUM SERPL-MCNC: 9.7 MG/DL (ref 8.3–10.6)
CHLORIDE BLD-SCNC: 99 MMOL/L (ref 99–110)
CLARITY: ABNORMAL
CO2: 28 MMOL/L (ref 21–32)
COLOR: YELLOW
CREAT SERPL-MCNC: 0.8 MG/DL (ref 0.6–1.1)
DIFFERENTIAL TYPE: ABNORMAL
EOSINOPHILS ABSOLUTE: 0.3 K/CU MM
EOSINOPHILS RELATIVE PERCENT: 3.4 % (ref 0–3)
ERYTHROCYTE SEDIMENTATION RATE: 4 MM/HR (ref 0–30)
GFR AFRICAN AMERICAN: >60 ML/MIN/1.73M2
GFR NON-AFRICAN AMERICAN: >60 ML/MIN/1.73M2
GLUCOSE BLD-MCNC: 222 MG/DL (ref 70–99)
GLUCOSE, URINE: NEGATIVE MG/DL
HCT VFR BLD CALC: 43 % (ref 37–47)
HEMOGLOBIN: 13.5 GM/DL (ref 12.5–16)
IMMATURE NEUTROPHIL %: 0.3 % (ref 0–0.43)
KETONES, URINE: NEGATIVE MG/DL
LEUKOCYTE ESTERASE, URINE: ABNORMAL
LYMPHOCYTES ABSOLUTE: 2.6 K/CU MM
LYMPHOCYTES RELATIVE PERCENT: 34.9 % (ref 24–44)
MCH RBC QN AUTO: 28.6 PG (ref 27–31)
MCHC RBC AUTO-ENTMCNC: 31.4 % (ref 32–36)
MCV RBC AUTO: 91.1 FL (ref 78–100)
MONOCYTES ABSOLUTE: 0.6 K/CU MM
MONOCYTES RELATIVE PERCENT: 7.9 % (ref 0–4)
MUCUS: ABNORMAL HPF
NITRITE URINE, QUANTITATIVE: NEGATIVE
NUCLEATED RBC %: 0 %
PDW BLD-RTO: 14.3 % (ref 11.7–14.9)
PH, URINE: 5 (ref 5–8)
PLATELET # BLD: 192 K/CU MM (ref 140–440)
PMV BLD AUTO: 11.2 FL (ref 7.5–11.1)
POTASSIUM SERPL-SCNC: 5 MMOL/L (ref 3.5–5.1)
PROTEIN UA: NEGATIVE MG/DL
RBC # BLD: 4.72 M/CU MM (ref 4.2–5.4)
RBC URINE: 7 /HPF (ref 0–6)
SEGMENTED NEUTROPHILS ABSOLUTE COUNT: 3.9 K/CU MM
SEGMENTED NEUTROPHILS RELATIVE PERCENT: 52.7 % (ref 36–66)
SODIUM BLD-SCNC: 139 MMOL/L (ref 135–145)
SPECIFIC GRAVITY UA: 1.02 (ref 1–1.03)
SQUAMOUS EPITHELIAL: 7 /HPF
TOTAL IMMATURE NEUTOROPHIL: 0.02 K/CU MM
TOTAL NUCLEATED RBC: 0 K/CU MM
TOTAL PROTEIN: 6.6 GM/DL (ref 6.4–8.2)
TRICHOMONAS: ABNORMAL /HPF
UROBILINOGEN, URINE: NEGATIVE MG/DL (ref 0.2–1)
WBC # BLD: 7.3 K/CU MM (ref 4–10.5)
WBC UA: 71 /HPF (ref 0–5)

## 2021-09-29 PROCEDURE — 85025 COMPLETE CBC W/AUTO DIFF WBC: CPT

## 2021-09-29 PROCEDURE — 80053 COMPREHEN METABOLIC PANEL: CPT

## 2021-09-29 PROCEDURE — 93010 ELECTROCARDIOGRAM REPORT: CPT | Performed by: INTERNAL MEDICINE

## 2021-09-29 PROCEDURE — 87086 URINE CULTURE/COLONY COUNT: CPT

## 2021-09-29 PROCEDURE — 81001 URINALYSIS AUTO W/SCOPE: CPT

## 2021-09-29 PROCEDURE — 93005 ELECTROCARDIOGRAM TRACING: CPT | Performed by: ORTHOPAEDIC SURGERY

## 2021-09-29 PROCEDURE — 85652 RBC SED RATE AUTOMATED: CPT

## 2021-09-29 PROCEDURE — 36415 COLL VENOUS BLD VENIPUNCTURE: CPT

## 2021-09-29 PROCEDURE — 87186 SC STD MICRODIL/AGAR DIL: CPT

## 2021-09-29 PROCEDURE — 87088 URINE BACTERIA CULTURE: CPT

## 2021-10-01 LAB
CULTURE: ABNORMAL
CULTURE: ABNORMAL
Lab: ABNORMAL
SPECIMEN: ABNORMAL

## 2021-10-04 ENCOUNTER — TELEPHONE (OUTPATIENT)
Dept: ORTHOPEDIC SURGERY | Age: 71
End: 2021-10-04

## 2021-10-04 NOTE — TELEPHONE ENCOUNTER
Patient called per Dr. Kwame Lozada to inform her that her urine test results show that she has a UTI. Patient has been instructed to contact her PCP, to get started on an antibiotic. Patient understands and agrees.

## 2021-10-05 ENCOUNTER — HOSPITAL ENCOUNTER (OUTPATIENT)
Age: 71
Discharge: HOME | End: 2021-10-05
Payer: MEDICARE

## 2021-10-05 PROCEDURE — U0003 INFECTIOUS AGENT DETECTION BY NUCLEIC ACID (DNA OR RNA); SEVERE ACUTE RESPIRATORY SYNDROME CORONAVIRUS 2 (SARS-COV-2) (CORONAVIRUS DISEASE [COVID-19]), AMPLIFIED PROBE TECHNIQUE, MAKING USE OF HIGH THROUGHPUT TECHNOLOGIES AS DESCRIBED BY CMS-2020-01-R: HCPCS

## 2021-10-05 PROCEDURE — U0005 INFEC AGEN DETEC AMPLI PROBE: HCPCS

## 2021-10-06 LAB
SARS-COV-2: NOT DETECTED
SOURCE: NORMAL

## 2021-10-07 ENCOUNTER — ANESTHESIA EVENT (OUTPATIENT)
Dept: OPERATING ROOM | Age: 71
End: 2021-10-07
Payer: MEDICARE

## 2021-10-07 ASSESSMENT — ENCOUNTER SYMPTOMS: SHORTNESS OF BREATH: 1

## 2021-10-07 NOTE — ANESTHESIA PRE PROCEDURE
Department of Anesthesiology  Preprocedure Note       Name:  Chelsi Emanuel   Age:  65 y.o.  :  1950                                          MRN:  [id number]         Date:  10/7/2021      Surgeon: Corrine Duarte):  Josefa Brown DO    Procedure: LEFT HIP TOTAL ARTHROPLASTY (Left )    Medications prior to admission:   Prior to Admission medications    Medication Sig Start Date End Date Taking?  Authorizing Provider   albuterol sulfate  (90 Base) MCG/ACT inhaler INHALE 2 PUFFS INTO THE LUNGS EVERY 6 HOURS AS NEEDED FOR WHEEZING 21  YOBANI Rutherford CNP   omeprazole (PRILOSEC) 20 MG delayed release capsule Take 20 mg by mouth daily    Historical Provider, MD   Lancets ([address],  [address] [place]) [address] [facility] USE AS [address] 21   Historical Provider, MD   ONETOUCH ULTRA strip TEST AS DIRECTED TWICE DAILY 21   Historical Provider, MD   famotidine (PEPCID) 20 MG tablet  21   Historical Provider, MD   doxycycline hyclate (VIBRAMYCIN) 100 MG capsule  5/10/21   Historical Provider, MD   SYMBICORT 160-4.5 MCG/ACT AERO INHALE 2 PUFFS INTO THE LUNGS TWICE DAILY 21   YOBANI Rutherford CNP   Spacer/Aero-Holding Orie Eis 1 Device by Does not apply route daily as needed (use with albuterol rescue inhaler) 21   YOBANI Rutherford CNP   Ferrous Sulfate (IRON) 28 MG TABS Take by mouth daily     Historical Provider, MD   ranolazine (RANEXA) 500 MG extended release tablet Take 1 tablet by mouth 2 times daily 20   YOBANI Mcclellan CNP   pantoprazole (PROTONIX) 40 MG tablet Take 40 mg by mouth daily    Historical Provider, MD   metFORMIN (GLUCOPHAGE) 500 MG tablet TK 2 T PO QAM AND 3 T PO QPM WITH MEALS 19   YOBANI Montemayor CNP   clopidogrel (PLAVIX) 75 MG tablet Take 1 tablet by mouth daily 19   YOBANI Montemayor CNP   OXYGEN Inhale into the lungs    Historical Provider, MD   quinapril (ACCUPRIL) 40 MG tablet Inhaler 5    Spacer/Aero-Holding Chambers HEATHER 1 Device by Does not apply route daily as needed (use with albuterol rescue inhaler) 1 Device 0    Ferrous Sulfate (IRON) 28 MG TABS Take by mouth daily       ranolazine (RANEXA) 500 MG extended release tablet Take 1 tablet by mouth 2 times daily 60 tablet 5    pantoprazole (PROTONIX) 40 MG tablet Take 40 mg by mouth daily      metFORMIN (GLUCOPHAGE) 500 MG tablet TK 2 T PO QAM AND 3 T PO QPM WITH MEALS 60 tablet 11    clopidogrel (PLAVIX) 75 MG tablet Take 1 tablet by mouth daily 30 tablet 3    OXYGEN Inhale into the lungs      quinapril (ACCUPRIL) 40 MG tablet Take 1 tablet by mouth nightly 90 tablet 3    carvedilol (COREG) 12.5 MG tablet Take 1 tablet by mouth 2 times daily (with meals) (Patient taking differently: Take 6.25 mg by mouth 2 times daily (with meals) ) 1180 tablet 3    atorvastatin (LIPITOR) 20 MG tablet Take 1 tablet by mouth nightly 90 tablet 3    hydrochlorothiazide (HYDRODIURIL) 25 MG tablet Take 1 tablet by mouth daily Takes 12.5 daily 1/2 of a 25 mg tablet 90 tablet 3    meclizine (ANTIVERT) 25 MG tablet TK 1 T PO  Q 4 H PRF  DIZZINESS  3    B Complex Vitamins (VITAMIN B COMPLEX PO) Take by mouth every morning       vitamin D (CHOLECALCIFEROL) 1000 UNIT TABS tablet Take 1,000 Units by mouth daily      ondansetron (ZOFRAN) 4 MG tablet Take 1 tablet by mouth every 8 hours as needed for Nausea or Vomiting 30 tablet 3    aspirin 81 MG tablet Take 81 mg by mouth every morning. Last Dose Taken 9-2-14 Due To Scheduled Surgery      glipiZIDE (GLUCOTROL) 5 MG tablet Take 5 mg by mouth 2 times daily (before meals).  levothyroxine (SYNTHROID) 150 MCG tablet Take 150 mcg by mouth nightly. No current facility-administered medications for this visit. Allergies:     Allergies   Allergen Reactions    Codeine Nausea Only       Problem List:    Patient Active Problem List   Diagnosis Code    Chest pain R07.9    Pulmonary arterial hypertension (HCC) I27.21    Disease of thyroid gland E07.9    Hyperlipidemia E78.5    Hypertension I10    Diabetes mellitus ([facility] [address] E11.9    CAD (coronary artery disease) I25.10    Dyspnea on exertion R06.00    Arthritis of left hip M16.12    Type 2 diabetes mellitus without complication (HCC) E18.9    S/P PTCA (percutaneous transluminal coronary angioplasty) Z98.61    Arthritis M19.90    Radial styloid tenosynovitis of right hand M65.4    S/P cardiac cath Z98.890    Kidney disorder N28.9    Mental disorder F99    Obstructive sleep apnea G47.33    Change in bowel function R19.8    Percutaneous transluminal coronary angioplasty (PTCA) within last 15 to 24 months Z98.61    Unstable angina ([facility]) I20.0    Status post angioplasty Z98.62    Restrictive lung disease secondary to obesity J98.4, E66.9    Nocturnal hypoxia G47.34    Morbid obesity with BMI of 40.0-44.9, adult ([facility]) E66.01, Z68.41       Past Medical History:        Diagnosis Date    Acid reflux     Angina at rest [facility]) In Past    Denies Chest Pain At This Time    Arthritis     \"Knees, Back And Hips\"    Asthma     Broken teeth     Upper    CAD (coronary artery disease)     Sees Dr. Arminda Knight a couple of heart stents- had stress test in May 2018 all ok\"    Cancer [facility])     skin cancer on face    Chronic back pain     Degenerative joint disease     Back     [facility]    GI bleed 11/2009    H/O Doppler venous ultrasound 02/11/2019    No significant insufficiency noted.  H/O echocardiogram 07/14/2014    EF 50%, low normal systolic function, no wall motion abnormalities, mild concentric hypertrophy and signs of diastolic dysfunction, mildly dilated left atrium,  no pericardial effusion, mild mitral and tricuspid regurg.  H/O echocardiogram 11/2219    EF 55-60%,  There is physiological to trace amount of pericardial fluid present.     H/O percutaneous transluminal coronary angioplasty 04/30/2019 LAD stent and PIC of restent stenosis also in LAD.  Hiatal hernia     History of complete ECG     07/20/2012=NSR, leftward axis, prob normal for age, poor r-waveprogression, inferior ST-T abnormalities, consider ischemia    History of echocardiogram 01/26/2017    Ejection fraction is visually estimated at 55%. Mild concentric left ventricular hypertrophy. Mildly dilated left atrium. Mild mitral regurgitation is present.  History of nuclear stress test 01/26/2017    cardiolite-moderate ischemia mid anteroseptal,EF70%    History of nuclear stress test 07/12/2018    Normal    Hx of 24 hour EKG monitoring 12/21/2011    brief run of SVT, otherwise noth clinically sig. arrhythmia noted    Hx of cardiovascular stress test 7/25/14 1/23/12 7/14 EF70% normal study 1/23/12=thallium stress=no scintigraphic inducible myocardial ischemia, EF 70%;  03/2011=EF 70%; 03/2010=EF 70%; 12/1997; 02/1997    Hx of cardiovascular stress test 05/04/2020    Normal stress test    Hx of CT scan     05/08/2011=CT chest with contrast=examination is moderately degraded by respiratory motion. No evidence for acute pulmonary thromboembolic disease.  Dilated main pulmonary artery suggesting elevated pulm artery pressures    Hx of Doppler ultrasound     bilateral carotid 03/11/2011= no significant stenosis present    Hx of echocardiogram     03/11/2011=mildly hypertreophied left ventricle, EF 60%, mild pulm. htn, mild aortic stenosis; 02/1997    HX OTHER MEDICAL     Primary Care Physician Is Dr. Nata Lentz Hyperlipidemia     Hypertension     Hyperthyroidism     Hypothyroidism, adult     Kidney stones Last Episode In 2000's    \"Passed Kidney Stones Three Different Times\"    Left hip pain     \"for months- had hip pain\"for steroid injection 7/5/2016    Migraines     Normal cardiac stress test 05/12/2016    EF70% Normal    ORLIN (obstructive sleep apnea)     doesnt use cpap, uses o2 2l as needed at night    Post PTCA     2008, ,     RECEIVED BLOOD TRANSFUSION 2009    RECEIVED BLOOD TRANSFUSION, NO REACTION TO THE BLOOD TRANSFUSION RECEIVED    Shortness of breath on exertion     Sleep apnea     NO CPAP- last sleep study done     Teeth missing     Upper And Lower    Ulcer 2009    Stomach, GI Bleeding, Received Blood Transfusions    Urinary incontinence     \"wear a pad all the time\"    UTI (urinary tract infection) In Past     No Current Symptoms    Venous US Dup     Wears glasses     Wears partial dentures     Upper       Past Surgical History:        Procedure Laterality Date    CARPAL TUNNEL RELEASE Bilateral  Right     1989 Left     SECTION  ,     Tubal Ligation Also Done In  With     CHOLECYSTECTOMY      COLONOSCOPY  Last Done In     COLONOSCOPY  3/23/15    Internal hemorrhoids    COLONOSCOPY  04/10/2019    COLONOSCOPY N/A 4/10/2019    COLONOSCOPY POLYPECTOMY SNARE/COLD BIOPSY performed by Carmita Garvey MD at [place] [address]  2010    balloon angioplasty of distal mid LAD;     CORONARY ANGIOPLASTY  2008    LAD stent 2.75x16 taxus    CORONARY ANGIOPLASTY  2008    2. 5x8 taxus stent to the ostium of the circ.  DENTAL SURGERY      Teeth Extracted In Past    DIAGNOSTIC CARDIAC CATH LAB PROCEDURE  2010    balloon angioplasty of distal mid LAD;     DIAGNOSTIC CARDIAC CATH LAB PROCEDURE  2008    LAD stent 2.75x16 taxus    DIAGNOSTIC CARDIAC CATH LAB PROCEDURE  2008    2. 5x8 taxus stent to the ostium of the circ.     ENDOSCOPY, COLON, DIAGNOSTIC  \"Several\"    ENDOSCOPY, COLON, DIAGNOSTIC  3/23/15    small hiatal hernia, anatomy consistent with banding, gastritis    FINGER TRIGGER RELEASE  10- \"One On Each Hand Trigger Finger Release\"     \"Left Middle Finger Trigger Finger Release\"    HYSTERECTOMY  1982    Partial Abdominal Hysterectomy, \"Pinned The Bladder Up\"    JOINT REPLACEMENT Right  Total Right Hip    JOINT REPLACEMENT Right     Total Right Knee    JOINT REPLACEMENT Left     Total Left Knee    JOINT REPLACEMENT Left     Revision Total Left Knee    KNEE SURGERY Right ,     KNEE SURGERY Left     Left Knee Bone Graft    KNEE SURGERY Right     Right Knee Bone Graft    OTHER SURGICAL HISTORY      \"Stomach Stapling\" Pre Surgery Weight Was 230 lbs    OTHER SURGICAL HISTORY Left 04/10/2017    left hip steroid injection    PTCA  16    Stenting of the LAD.  SHOULDER ARTHROSCOPY Right 2-10    SHOULDER ARTHROSCOPY Left 09/10/14    TONSILLECTOMY AND ADENOIDECTOMY  1964    TUBAL LIGATION      Done With     WISDOM TOOTH EXTRACTION      All Four [place] Teeth Extracted In Past       Social History:    Social History     Tobacco Use    Smoking status: Former Smoker     Packs/day: 0.25     Years: 38.00     Pack years: 9.50     Types: Cigarettes     Start date: 1962     Quit date: 2000     Years since quittin.1    Smokeless tobacco: Never Used    Tobacco comment: \"never a heavy smoker just occ smoker\"   Substance Use Topics    Alcohol use: No                                Counseling given: Not Answered  Comment: \"never a heavy smoker just occ smoker\"      Vital Signs (Current): There were no vitals filed for this visit.                                            BP Readings from Last 3 Encounters:   21 101/69   21 126/80   10/09/20 118/80       NPO Status:                                                                                 BMI:   Wt Readings from Last 3 Encounters:   21 225 lb (102.1 kg)   21 225 lb (102.1 kg)   21 225 lb (102.1 kg)     There is no height or weight on file to calculate BMI.    CBC:   Lab Results   Component Value Date    WBC 7.3 2021    RBC 4.72 2021    HGB 13.5 2021    HCT 43.0 2021    MCV 91.1 2021    RDW 14.3 2021     09/29/2021       CMP:   Lab Results   Component Value Date     09/29/2021    K 5.0 09/29/2021    CL 99 09/29/2021    CO2 28 09/29/2021    BUN 13 09/29/2021    CREATININE 0.8 09/29/2021    GFRAA >60 09/29/2021    LABGLOM >60 09/29/2021    GLUCOSE 222 09/29/2021    PROT 6.6 09/29/2021    PROT 6.6 03/12/2013    CALCIUM 9.7 09/29/2021    BILITOT 0.7 09/29/2021    ALKPHOS 83 09/29/2021    AST 24 09/29/2021    ALT 27 09/29/2021       POC Tests: No results for input(s): POCGLU, POCNA, POCK, POCCL, POCBUN, POCHEMO, POCHCT in the last 72 hours.     Coags:   Lab Results   Component Value Date    PROTIME 11.5 04/29/2019    PROTIME 11.8 12/20/2011    INR 1.01 04/29/2019    APTT 23.5 04/29/2019       HCG (If Applicable): No results found for: PREGTESTUR, PREGSERUM, HCG, HCGQUANT     ABGs: No results found for: PHART, PO2ART, EPH8SUE, DAW1FOX, BEART, O3BACWXM     Type & Screen (If Applicable):  No results found for: LABABO, [address]    Anesthesia Evaluation  Patient summary reviewed no history of anesthetic complications:   Airway: Mallampati: III  TM distance: >3 FB   Neck ROM: limited  Mouth opening: < 3 FB Dental:      Comment: Poor dentition    Pulmonary:normal exam    (+) shortness of breath:  sleep apnea:  asthma:                           ROS comment: 2 l o2 hs prn    Cardiovascular:  Exercise tolerance: poor (<4 METS),   (+) hypertension:, valvular problems/murmurs: AS, angina:, CAD:, CABG/stent:, dysrhythmias: atrial fibrillation, ECHAVARRIA:, pulmonary hypertension: mild, hyperlipidemia      ECG reviewed      Echocardiogram reviewed  Stress test reviewed  Cleared by cardiology     Beta Blocker:  Dose within 24 Hrs      ROS comment:  Sinus rhythm with premature atrial complexes   Left axis deviation   Abnormal ECG   When compared with ECG of 13-MAY-2021 09:37,   Sinus rhythm has replaced Atrial fibrillation   No significant change was found     Confirmed by [facility] MD, Yadira Walker ([id number]) on 9/29/2021 11:55:14 AM    Resulting Yamila Rogers physician Dr. Gerry Rajan .   Normal LV function. normal EDV   There is normal isotope uptake following exercise and at rest. There is no   evidence of exercise induced ischemia.   This is a normal study.      Recommendation   Recommendation: Routine follow-up.      Signatures      ------------------------------------------------------------------   Electronically signed by Abhi Quesada MD   (Interpreting cardiologist) on 05/05/2021 at 16:19       Summary   LV function and size are normal, Ejection Fraction 55-60 %. Normal left ventricular wall thickness. No regional wall motion abnormalities were detected. Diastolic Dysfunction Grade I .   Mildly dilated left atrium. No significant valvular abnormalities. RVSP= 24 mmHg. There is physiological to trace amount of pericardial fluid present.       Signature      ------------------------------------------------------------------   Electronically signed by Abhi Quesada MD   (Interpreting physician) on 11/22/2019 at 03:15 PM     Procedure Summary   severe mid LAD stenosis and severe 2nd lesion at the distal mid   LAD restenosis, s/p successful PCI with OSWALD and successful PTCA   of instent restenosis with NC balloon      Recommendations   Load plavix and aspirin      Signatures      --------------------------------------------------------   Electronically signed by Abhi Quesada MD (Performing Physician) on 04/30/2019 at 09:02   --------------------------------------------------------    Tom Tamar was evaluated from a cardiac standpoint for her surgery or procedure and based on her history, diagnosis, recent cardiac testing, she is considered a medium risk candidate for any meghana-operative cardiac complications.     Patients with known CAD with moderate or high risk should have surgical procedures done where they have access to invasive cardiology services if emergently needed.     Antiplatelet/anticoagulant therapy can be held prior to the surgery or procedure at the discretion of the surgeon to be resumed as soon as possible if held.     Please call with any further questions.     Respectfully,     YOBANI Zavala-CNP  As/bl  This cardiac clearance is good for 6 months from 8/31/2021 unless new cardiac symptoms arise. Neuro/Psych:   (+) headaches: migraine headaches, psychiatric history:            GI/Hepatic/Renal:   (+) hiatal hernia, GERD:, morbid obesity (bmi 41.15)     (-) bowel prep       Endo/Other:    (+) DiabetesType II DM, , hypothyroidism, blood dyscrasia: anticoagulation therapy, arthritis: OA., malignancy/cancer (skin). Abdominal:             Vascular: negative vascular ROS. Other Findings:             Anesthesia Plan      MAC, spinal and regional     ASA 3       Induction: intravenous. Anesthetic plan and risks discussed with patient. Plan discussed with CRNA.               YOBANI Pillai - CRNA   10/7/2021

## 2021-10-12 ENCOUNTER — ANESTHESIA (OUTPATIENT)
Dept: OPERATING ROOM | Age: 71
End: 2021-10-12
Payer: MEDICARE

## 2021-10-12 ENCOUNTER — HOSPITAL ENCOUNTER (OUTPATIENT)
Age: 71
Discharge: HOME HEALTH | End: 2021-10-13
Attending: ORTHOPAEDIC SURGERY | Admitting: ORTHOPAEDIC SURGERY
Payer: MEDICARE

## 2021-10-12 ENCOUNTER — APPOINTMENT (OUTPATIENT)
Dept: GENERAL RADIOLOGY | Age: 71
End: 2021-10-12
Attending: ORTHOPAEDIC SURGERY
Payer: MEDICARE

## 2021-10-12 VITALS — TEMPERATURE: 97.7 F | DIASTOLIC BLOOD PRESSURE: 64 MMHG | SYSTOLIC BLOOD PRESSURE: 91 MMHG | OXYGEN SATURATION: 100 %

## 2021-10-12 DIAGNOSIS — Z96.642 HISTORY OF LEFT HIP REPLACEMENT: Primary | ICD-10-CM

## 2021-10-12 LAB
ANION GAP SERPL CALCULATED.3IONS-SCNC: 17 MMOL/L (ref 4–16)
BUN BLDV-MCNC: 15 MG/DL (ref 6–23)
CALCIUM SERPL-MCNC: 9.1 MG/DL (ref 8.3–10.6)
CHLORIDE BLD-SCNC: 98 MMOL/L (ref 99–110)
CO2: 23 MMOL/L (ref 21–32)
CREAT SERPL-MCNC: 1 MG/DL (ref 0.6–1.1)
GFR AFRICAN AMERICAN: >60 ML/MIN/1.73M2
GFR NON-AFRICAN AMERICAN: 55 ML/MIN/1.73M2
GLUCOSE BLD-MCNC: 217 MG/DL (ref 70–99)
GLUCOSE BLD-MCNC: 221 MG/DL (ref 70–99)
GLUCOSE BLD-MCNC: 503 MG/DL (ref 70–99)
HCT VFR BLD CALC: 40.3 % (ref 37–47)
HEMOGLOBIN: 12.2 GM/DL (ref 12.5–16)
MAGNESIUM: 1.1 MG/DL (ref 1.8–2.4)
MCH RBC QN AUTO: 28.4 PG (ref 27–31)
MCHC RBC AUTO-ENTMCNC: 30.3 % (ref 32–36)
MCV RBC AUTO: 93.9 FL (ref 78–100)
PDW BLD-RTO: 14.3 % (ref 11.7–14.9)
PLATELET # BLD: 225 K/CU MM (ref 140–440)
PMV BLD AUTO: 11.5 FL (ref 7.5–11.1)
POTASSIUM SERPL-SCNC: 5.3 MMOL/L (ref 3.5–5.1)
RBC # BLD: 4.29 M/CU MM (ref 4.2–5.4)
SODIUM BLD-SCNC: 138 MMOL/L (ref 135–145)
WBC # BLD: 14.7 K/CU MM (ref 4–10.5)

## 2021-10-12 PROCEDURE — 97535 SELF CARE MNGMENT TRAINING: CPT

## 2021-10-12 PROCEDURE — 3600000015 HC SURGERY LEVEL 5 ADDTL 15MIN: Performed by: ORTHOPAEDIC SURGERY

## 2021-10-12 PROCEDURE — 2580000003 HC RX 258: Performed by: ORTHOPAEDIC SURGERY

## 2021-10-12 PROCEDURE — 97530 THERAPEUTIC ACTIVITIES: CPT

## 2021-10-12 PROCEDURE — 7100000001 HC PACU RECOVERY - ADDTL 15 MIN: Performed by: ORTHOPAEDIC SURGERY

## 2021-10-12 PROCEDURE — 6360000002 HC RX W HCPCS: Performed by: NURSE ANESTHETIST, CERTIFIED REGISTERED

## 2021-10-12 PROCEDURE — 2500000003 HC RX 250 WO HCPCS: Performed by: ORTHOPAEDIC SURGERY

## 2021-10-12 PROCEDURE — 7100000000 HC PACU RECOVERY - FIRST 15 MIN: Performed by: ORTHOPAEDIC SURGERY

## 2021-10-12 PROCEDURE — 3700000001 HC ADD 15 MINUTES (ANESTHESIA): Performed by: ORTHOPAEDIC SURGERY

## 2021-10-12 PROCEDURE — 97163 PT EVAL HIGH COMPLEX 45 MIN: CPT

## 2021-10-12 PROCEDURE — 73502 X-RAY EXAM HIP UNI 2-3 VIEWS: CPT

## 2021-10-12 PROCEDURE — 27130 TOTAL HIP ARTHROPLASTY: CPT | Performed by: PHYSICIAN ASSISTANT

## 2021-10-12 PROCEDURE — 6370000000 HC RX 637 (ALT 250 FOR IP): Performed by: INTERNAL MEDICINE

## 2021-10-12 PROCEDURE — C1776 JOINT DEVICE (IMPLANTABLE): HCPCS | Performed by: ORTHOPAEDIC SURGERY

## 2021-10-12 PROCEDURE — 2500000003 HC RX 250 WO HCPCS: Performed by: NURSE ANESTHETIST, CERTIFIED REGISTERED

## 2021-10-12 PROCEDURE — 2709999900 HC NON-CHARGEABLE SUPPLY: Performed by: ORTHOPAEDIC SURGERY

## 2021-10-12 PROCEDURE — 6370000000 HC RX 637 (ALT 250 FOR IP): Performed by: ORTHOPAEDIC SURGERY

## 2021-10-12 PROCEDURE — C1713 ANCHOR/SCREW BN/BN,TIS/BN: HCPCS | Performed by: ORTHOPAEDIC SURGERY

## 2021-10-12 PROCEDURE — 97166 OT EVAL MOD COMPLEX 45 MIN: CPT

## 2021-10-12 PROCEDURE — 97116 GAIT TRAINING THERAPY: CPT

## 2021-10-12 PROCEDURE — 3600000005 HC SURGERY LEVEL 5 BASE: Performed by: ORTHOPAEDIC SURGERY

## 2021-10-12 PROCEDURE — 80048 BASIC METABOLIC PNL TOTAL CA: CPT

## 2021-10-12 PROCEDURE — 36415 COLL VENOUS BLD VENIPUNCTURE: CPT

## 2021-10-12 PROCEDURE — 3700000000 HC ANESTHESIA ATTENDED CARE: Performed by: ORTHOPAEDIC SURGERY

## 2021-10-12 PROCEDURE — 82962 GLUCOSE BLOOD TEST: CPT

## 2021-10-12 PROCEDURE — 6360000002 HC RX W HCPCS: Performed by: ORTHOPAEDIC SURGERY

## 2021-10-12 PROCEDURE — 6360000002 HC RX W HCPCS: Performed by: ANESTHESIOLOGY

## 2021-10-12 PROCEDURE — 27130 TOTAL HIP ARTHROPLASTY: CPT | Performed by: ORTHOPAEDIC SURGERY

## 2021-10-12 PROCEDURE — 64450 NJX AA&/STRD OTHER PN/BRANCH: CPT | Performed by: ANESTHESIOLOGY

## 2021-10-12 PROCEDURE — 2720000010 HC SURG SUPPLY STERILE: Performed by: ORTHOPAEDIC SURGERY

## 2021-10-12 PROCEDURE — 83735 ASSAY OF MAGNESIUM: CPT

## 2021-10-12 PROCEDURE — 94640 AIRWAY INHALATION TREATMENT: CPT

## 2021-10-12 PROCEDURE — 85027 COMPLETE CBC AUTOMATED: CPT

## 2021-10-12 RX ORDER — PANTOPRAZOLE SODIUM 40 MG/1
40 TABLET, DELAYED RELEASE ORAL
Status: DISCONTINUED | OUTPATIENT
Start: 2021-10-13 | End: 2021-10-13 | Stop reason: HOSPADM

## 2021-10-12 RX ORDER — SODIUM CHLORIDE 9 MG/ML
25 INJECTION, SOLUTION INTRAVENOUS PRN
Status: DISCONTINUED | OUTPATIENT
Start: 2021-10-12 | End: 2021-10-12 | Stop reason: HOSPADM

## 2021-10-12 RX ORDER — HYDROCODONE BITARTRATE AND ACETAMINOPHEN 5; 325 MG/1; MG/1
2 TABLET ORAL PRN
Status: DISCONTINUED | OUTPATIENT
Start: 2021-10-12 | End: 2021-10-12 | Stop reason: HOSPADM

## 2021-10-12 RX ORDER — ONDANSETRON 2 MG/ML
INJECTION INTRAMUSCULAR; INTRAVENOUS PRN
Status: DISCONTINUED | OUTPATIENT
Start: 2021-10-12 | End: 2021-10-12 | Stop reason: SDUPTHER

## 2021-10-12 RX ORDER — ONDANSETRON 2 MG/ML
4 INJECTION INTRAMUSCULAR; INTRAVENOUS EVERY 6 HOURS PRN
Status: DISCONTINUED | OUTPATIENT
Start: 2021-10-12 | End: 2021-10-13 | Stop reason: HOSPADM

## 2021-10-12 RX ORDER — SODIUM CHLORIDE 0.9 % (FLUSH) 0.9 %
10 SYRINGE (ML) INJECTION EVERY 12 HOURS SCHEDULED
Status: DISCONTINUED | OUTPATIENT
Start: 2021-10-12 | End: 2021-10-12 | Stop reason: HOSPADM

## 2021-10-12 RX ORDER — SODIUM CHLORIDE, SODIUM LACTATE, POTASSIUM CHLORIDE, CALCIUM CHLORIDE 600; 310; 30; 20 MG/100ML; MG/100ML; MG/100ML; MG/100ML
INJECTION, SOLUTION INTRAVENOUS CONTINUOUS
Status: DISCONTINUED | OUTPATIENT
Start: 2021-10-12 | End: 2021-10-12

## 2021-10-12 RX ORDER — DEXAMETHASONE SODIUM PHOSPHATE 4 MG/ML
INJECTION, SOLUTION INTRA-ARTICULAR; INTRALESIONAL; INTRAMUSCULAR; INTRAVENOUS; SOFT TISSUE PRN
Status: DISCONTINUED | OUTPATIENT
Start: 2021-10-12 | End: 2021-10-12 | Stop reason: SDUPTHER

## 2021-10-12 RX ORDER — HYDROCHLOROTHIAZIDE 25 MG/1
25 TABLET ORAL DAILY
Status: DISCONTINUED | OUTPATIENT
Start: 2021-10-12 | End: 2021-10-13 | Stop reason: HOSPADM

## 2021-10-12 RX ORDER — CEFAZOLIN SODIUM 2 G/100ML
2000 INJECTION, SOLUTION INTRAVENOUS
Status: COMPLETED | OUTPATIENT
Start: 2021-10-12 | End: 2021-10-12

## 2021-10-12 RX ORDER — DIPHENHYDRAMINE HYDROCHLORIDE 50 MG/ML
12.5 INJECTION INTRAMUSCULAR; INTRAVENOUS
Status: DISCONTINUED | OUTPATIENT
Start: 2021-10-12 | End: 2021-10-12 | Stop reason: HOSPADM

## 2021-10-12 RX ORDER — SODIUM CHLORIDE 9 MG/ML
25 INJECTION, SOLUTION INTRAVENOUS PRN
Status: DISCONTINUED | OUTPATIENT
Start: 2021-10-12 | End: 2021-10-13 | Stop reason: HOSPADM

## 2021-10-12 RX ORDER — OXYCODONE HYDROCHLORIDE 5 MG/1
5 TABLET ORAL EVERY 4 HOURS PRN
Status: DISCONTINUED | OUTPATIENT
Start: 2021-10-12 | End: 2021-10-13 | Stop reason: HOSPADM

## 2021-10-12 RX ORDER — ROPIVACAINE HYDROCHLORIDE 5 MG/ML
INJECTION, SOLUTION EPIDURAL; INFILTRATION; PERINEURAL
Status: COMPLETED | OUTPATIENT
Start: 2021-10-12 | End: 2021-10-12

## 2021-10-12 RX ORDER — MIDAZOLAM HYDROCHLORIDE 1 MG/ML
INJECTION INTRAMUSCULAR; INTRAVENOUS PRN
Status: DISCONTINUED | OUTPATIENT
Start: 2021-10-12 | End: 2021-10-12 | Stop reason: SDUPTHER

## 2021-10-12 RX ORDER — SENNA AND DOCUSATE SODIUM 50; 8.6 MG/1; MG/1
1 TABLET, FILM COATED ORAL 2 TIMES DAILY
Status: DISCONTINUED | OUTPATIENT
Start: 2021-10-12 | End: 2021-10-13 | Stop reason: HOSPADM

## 2021-10-12 RX ORDER — LABETALOL HYDROCHLORIDE 5 MG/ML
5 INJECTION, SOLUTION INTRAVENOUS EVERY 10 MIN PRN
Status: DISCONTINUED | OUTPATIENT
Start: 2021-10-12 | End: 2021-10-12 | Stop reason: HOSPADM

## 2021-10-12 RX ORDER — PHENYLEPHRINE HCL IN 0.9% NACL 1 MG/10 ML
SYRINGE (ML) INTRAVENOUS PRN
Status: DISCONTINUED | OUTPATIENT
Start: 2021-10-12 | End: 2021-10-12 | Stop reason: SDUPTHER

## 2021-10-12 RX ORDER — SODIUM CHLORIDE 0.9 % (FLUSH) 0.9 %
10 SYRINGE (ML) INJECTION PRN
Status: DISCONTINUED | OUTPATIENT
Start: 2021-10-12 | End: 2021-10-13 | Stop reason: HOSPADM

## 2021-10-12 RX ORDER — KETAMINE HCL 50MG/ML(1)
SYRINGE (ML) INTRAVENOUS PRN
Status: DISCONTINUED | OUTPATIENT
Start: 2021-10-12 | End: 2021-10-12 | Stop reason: SDUPTHER

## 2021-10-12 RX ORDER — KETOROLAC TROMETHAMINE 30 MG/ML
15 INJECTION, SOLUTION INTRAMUSCULAR; INTRAVENOUS EVERY 6 HOURS PRN
Status: DISCONTINUED | OUTPATIENT
Start: 2021-10-12 | End: 2021-10-13 | Stop reason: HOSPADM

## 2021-10-12 RX ORDER — SODIUM CHLORIDE, SODIUM LACTATE, POTASSIUM CHLORIDE, CALCIUM CHLORIDE 600; 310; 30; 20 MG/100ML; MG/100ML; MG/100ML; MG/100ML
INJECTION, SOLUTION INTRAVENOUS CONTINUOUS
Status: DISCONTINUED | OUTPATIENT
Start: 2021-10-12 | End: 2021-10-13 | Stop reason: HOSPADM

## 2021-10-12 RX ORDER — FENTANYL CITRATE 50 UG/ML
50 INJECTION, SOLUTION INTRAMUSCULAR; INTRAVENOUS EVERY 5 MIN PRN
Status: DISCONTINUED | OUTPATIENT
Start: 2021-10-12 | End: 2021-10-12 | Stop reason: HOSPADM

## 2021-10-12 RX ORDER — OXYCODONE HYDROCHLORIDE 10 MG/1
10 TABLET ORAL EVERY 4 HOURS PRN
Status: DISCONTINUED | OUTPATIENT
Start: 2021-10-12 | End: 2021-10-13 | Stop reason: HOSPADM

## 2021-10-12 RX ORDER — ACETAMINOPHEN 325 MG/1
650 TABLET ORAL EVERY 6 HOURS
Status: DISCONTINUED | OUTPATIENT
Start: 2021-10-12 | End: 2021-10-13 | Stop reason: HOSPADM

## 2021-10-12 RX ORDER — MEPERIDINE HYDROCHLORIDE 25 MG/ML
12.5 INJECTION INTRAMUSCULAR; INTRAVENOUS; SUBCUTANEOUS EVERY 5 MIN PRN
Status: DISCONTINUED | OUTPATIENT
Start: 2021-10-12 | End: 2021-10-12 | Stop reason: HOSPADM

## 2021-10-12 RX ORDER — ACETAMINOPHEN 500 MG
1000 TABLET ORAL ONCE
Status: COMPLETED | OUTPATIENT
Start: 2021-10-12 | End: 2021-10-12

## 2021-10-12 RX ORDER — HYDROMORPHONE HCL 110MG/55ML
0.5 PATIENT CONTROLLED ANALGESIA SYRINGE INTRAVENOUS EVERY 5 MIN PRN
Status: DISCONTINUED | OUTPATIENT
Start: 2021-10-12 | End: 2021-10-12 | Stop reason: HOSPADM

## 2021-10-12 RX ORDER — CLOPIDOGREL BISULFATE 75 MG/1
75 TABLET ORAL DAILY
Status: DISCONTINUED | OUTPATIENT
Start: 2021-10-13 | End: 2021-10-13 | Stop reason: HOSPADM

## 2021-10-12 RX ORDER — SODIUM CHLORIDE 0.9 % (FLUSH) 0.9 %
10 SYRINGE (ML) INJECTION EVERY 12 HOURS SCHEDULED
Status: DISCONTINUED | OUTPATIENT
Start: 2021-10-12 | End: 2021-10-13 | Stop reason: HOSPADM

## 2021-10-12 RX ORDER — RANOLAZINE 500 MG/1
500 TABLET, EXTENDED RELEASE ORAL 2 TIMES DAILY
Status: DISCONTINUED | OUTPATIENT
Start: 2021-10-12 | End: 2021-10-13 | Stop reason: HOSPADM

## 2021-10-12 RX ORDER — LEVOTHYROXINE SODIUM 0.07 MG/1
150 TABLET ORAL NIGHTLY
Status: DISCONTINUED | OUTPATIENT
Start: 2021-10-12 | End: 2021-10-13 | Stop reason: HOSPADM

## 2021-10-12 RX ORDER — ALBUTEROL SULFATE 90 UG/1
2 AEROSOL, METERED RESPIRATORY (INHALATION) EVERY 6 HOURS PRN
Status: DISCONTINUED | OUTPATIENT
Start: 2021-10-12 | End: 2021-10-13 | Stop reason: HOSPADM

## 2021-10-12 RX ORDER — CEFAZOLIN SODIUM 2 G/100ML
2000 INJECTION, SOLUTION INTRAVENOUS ONCE
Status: DISCONTINUED | OUTPATIENT
Start: 2021-10-12 | End: 2021-10-12 | Stop reason: SDUPTHER

## 2021-10-12 RX ORDER — GLIPIZIDE 5 MG/1
5 TABLET ORAL
Status: DISCONTINUED | OUTPATIENT
Start: 2021-10-12 | End: 2021-10-13

## 2021-10-12 RX ORDER — TRANEXAMIC ACID 10 MG/ML
1000 INJECTION, SOLUTION INTRAVENOUS
Status: ACTIVE | OUTPATIENT
Start: 2021-10-12 | End: 2021-10-12

## 2021-10-12 RX ORDER — PROPOFOL 10 MG/ML
INJECTION, EMULSION INTRAVENOUS CONTINUOUS PRN
Status: DISCONTINUED | OUTPATIENT
Start: 2021-10-12 | End: 2021-10-12 | Stop reason: SDUPTHER

## 2021-10-12 RX ORDER — CEFAZOLIN SODIUM 2 G/100ML
2000 INJECTION, SOLUTION INTRAVENOUS EVERY 8 HOURS
Status: COMPLETED | OUTPATIENT
Start: 2021-10-12 | End: 2021-10-13

## 2021-10-12 RX ORDER — ASPIRIN 81 MG/1
81 TABLET, CHEWABLE ORAL EVERY MORNING
Status: DISCONTINUED | OUTPATIENT
Start: 2021-10-13 | End: 2021-10-13 | Stop reason: HOSPADM

## 2021-10-12 RX ORDER — HYDRALAZINE HYDROCHLORIDE 20 MG/ML
5 INJECTION INTRAMUSCULAR; INTRAVENOUS EVERY 10 MIN PRN
Status: DISCONTINUED | OUTPATIENT
Start: 2021-10-12 | End: 2021-10-12 | Stop reason: HOSPADM

## 2021-10-12 RX ORDER — LIDOCAINE HYDROCHLORIDE 20 MG/ML
INJECTION, SOLUTION INTRAVENOUS PRN
Status: DISCONTINUED | OUTPATIENT
Start: 2021-10-12 | End: 2021-10-12 | Stop reason: SDUPTHER

## 2021-10-12 RX ORDER — PROMETHAZINE HYDROCHLORIDE 25 MG/ML
6.25 INJECTION, SOLUTION INTRAMUSCULAR; INTRAVENOUS
Status: DISCONTINUED | OUTPATIENT
Start: 2021-10-12 | End: 2021-10-12 | Stop reason: HOSPADM

## 2021-10-12 RX ORDER — TRANEXAMIC ACID 100 MG/ML
INJECTION, SOLUTION INTRAVENOUS
Status: COMPLETED | OUTPATIENT
Start: 2021-10-12 | End: 2021-10-12

## 2021-10-12 RX ORDER — METOCLOPRAMIDE HYDROCHLORIDE 5 MG/ML
10 INJECTION INTRAMUSCULAR; INTRAVENOUS
Status: DISCONTINUED | OUTPATIENT
Start: 2021-10-12 | End: 2021-10-12 | Stop reason: HOSPADM

## 2021-10-12 RX ORDER — HYDROCODONE BITARTRATE AND ACETAMINOPHEN 5; 325 MG/1; MG/1
1 TABLET ORAL PRN
Status: DISCONTINUED | OUTPATIENT
Start: 2021-10-12 | End: 2021-10-12 | Stop reason: HOSPADM

## 2021-10-12 RX ORDER — BUDESONIDE AND FORMOTEROL FUMARATE DIHYDRATE 160; 4.5 UG/1; UG/1
2 AEROSOL RESPIRATORY (INHALATION) 2 TIMES DAILY
Status: DISCONTINUED | OUTPATIENT
Start: 2021-10-12 | End: 2021-10-13 | Stop reason: HOSPADM

## 2021-10-12 RX ORDER — PROMETHAZINE HYDROCHLORIDE 25 MG/1
12.5 TABLET ORAL EVERY 6 HOURS PRN
Status: DISCONTINUED | OUTPATIENT
Start: 2021-10-12 | End: 2021-10-13 | Stop reason: HOSPADM

## 2021-10-12 RX ORDER — SODIUM CHLORIDE 0.9 % (FLUSH) 0.9 %
10 SYRINGE (ML) INJECTION PRN
Status: DISCONTINUED | OUTPATIENT
Start: 2021-10-12 | End: 2021-10-12 | Stop reason: HOSPADM

## 2021-10-12 RX ORDER — CARVEDILOL 6.25 MG/1
6.25 TABLET ORAL 2 TIMES DAILY WITH MEALS
Status: DISCONTINUED | OUTPATIENT
Start: 2021-10-12 | End: 2021-10-13 | Stop reason: HOSPADM

## 2021-10-12 RX ADMIN — Medication 100 MCG: at 11:21

## 2021-10-12 RX ADMIN — Medication 1000 UNITS: at 18:05

## 2021-10-12 RX ADMIN — KETOROLAC TROMETHAMINE 15 MG: 30 INJECTION, SOLUTION INTRAMUSCULAR; INTRAVENOUS at 15:58

## 2021-10-12 RX ADMIN — SODIUM CHLORIDE, POTASSIUM CHLORIDE, SODIUM LACTATE AND CALCIUM CHLORIDE: 600; 310; 30; 20 INJECTION, SOLUTION INTRAVENOUS at 12:54

## 2021-10-12 RX ADMIN — DEXAMETHASONE SODIUM PHOSPHATE 8 MG: 4 INJECTION, SOLUTION INTRAMUSCULAR; INTRAVENOUS at 10:50

## 2021-10-12 RX ADMIN — MIDAZOLAM 4 MG: 1 INJECTION INTRAMUSCULAR; INTRAVENOUS at 09:26

## 2021-10-12 RX ADMIN — SODIUM CHLORIDE, POTASSIUM CHLORIDE, SODIUM LACTATE AND CALCIUM CHLORIDE: 600; 310; 30; 20 INJECTION, SOLUTION INTRAVENOUS at 12:19

## 2021-10-12 RX ADMIN — ACETAMINOPHEN 1000 MG: 500 TABLET ORAL at 08:11

## 2021-10-12 RX ADMIN — PROPOFOL 100 MCG/KG/MIN: 10 INJECTION, EMULSION INTRAVENOUS at 10:50

## 2021-10-12 RX ADMIN — SODIUM CHLORIDE, POTASSIUM CHLORIDE, SODIUM LACTATE AND CALCIUM CHLORIDE: 600; 310; 30; 20 INJECTION, SOLUTION INTRAVENOUS at 21:33

## 2021-10-12 RX ADMIN — OXYCODONE HYDROCHLORIDE 10 MG: 10 TABLET ORAL at 17:17

## 2021-10-12 RX ADMIN — ROPIVACAINE HYDROCHLORIDE 30 ML: 5 INJECTION, SOLUTION EPIDURAL; INFILTRATION; PERINEURAL at 09:33

## 2021-10-12 RX ADMIN — DOCUSATE SODIUM 50 MG AND SENNOSIDES 8.6 MG 1 TABLET: 8.6; 5 TABLET, FILM COATED ORAL at 14:26

## 2021-10-12 RX ADMIN — LIDOCAINE HYDROCHLORIDE 50 MG: 20 INJECTION, SOLUTION INTRAVENOUS at 10:50

## 2021-10-12 RX ADMIN — CEFAZOLIN SODIUM 2000 MG: 2 INJECTION, SOLUTION INTRAVENOUS at 10:47

## 2021-10-12 RX ADMIN — GLIPIZIDE 5 MG: 5 TABLET ORAL at 18:05

## 2021-10-12 RX ADMIN — Medication 100 MCG: at 11:04

## 2021-10-12 RX ADMIN — CEFAZOLIN SODIUM 2000 MG: 2 INJECTION, SOLUTION INTRAVENOUS at 17:23

## 2021-10-12 RX ADMIN — SODIUM CHLORIDE 25 ML: 9 INJECTION, SOLUTION INTRAVENOUS at 17:21

## 2021-10-12 RX ADMIN — SODIUM CHLORIDE, PRESERVATIVE FREE 10 ML: 5 INJECTION INTRAVENOUS at 21:28

## 2021-10-12 RX ADMIN — RIVAROXABAN 10 MG: 10 TABLET, FILM COATED ORAL at 21:28

## 2021-10-12 RX ADMIN — ONDANSETRON 4 MG: 2 INJECTION INTRAMUSCULAR; INTRAVENOUS at 10:50

## 2021-10-12 RX ADMIN — RANOLAZINE 500 MG: 500 TABLET, EXTENDED RELEASE ORAL at 21:27

## 2021-10-12 RX ADMIN — Medication 25 MG: at 11:05

## 2021-10-12 RX ADMIN — Medication 25 MG: at 10:50

## 2021-10-12 RX ADMIN — CARVEDILOL 6.25 MG: 6.25 TABLET, FILM COATED ORAL at 18:05

## 2021-10-12 RX ADMIN — Medication 100 MCG: at 11:13

## 2021-10-12 RX ADMIN — DOCUSATE SODIUM 50 MG AND SENNOSIDES 8.6 MG 1 TABLET: 8.6; 5 TABLET, FILM COATED ORAL at 21:28

## 2021-10-12 RX ADMIN — BUDESONIDE AND FORMOTEROL FUMARATE DIHYDRATE 2 PUFF: 160; 4.5 AEROSOL RESPIRATORY (INHALATION) at 19:25

## 2021-10-12 RX ADMIN — ACETAMINOPHEN 650 MG: 325 TABLET ORAL at 14:26

## 2021-10-12 RX ADMIN — SODIUM CHLORIDE, POTASSIUM CHLORIDE, SODIUM LACTATE AND CALCIUM CHLORIDE: 600; 310; 30; 20 INJECTION, SOLUTION INTRAVENOUS at 08:06

## 2021-10-12 RX ADMIN — LEVOTHYROXINE SODIUM 150 MCG: 0.07 TABLET ORAL at 21:28

## 2021-10-12 RX ADMIN — OXYCODONE HYDROCHLORIDE 10 MG: 10 TABLET ORAL at 21:28

## 2021-10-12 RX ADMIN — ACETAMINOPHEN 650 MG: 325 TABLET ORAL at 21:28

## 2021-10-12 RX ADMIN — HYDROCHLOROTHIAZIDE 25 MG: 25 TABLET ORAL at 18:05

## 2021-10-12 ASSESSMENT — PULMONARY FUNCTION TESTS
PIF_VALUE: 1
PIF_VALUE: 1
PIF_VALUE: 0
PIF_VALUE: 1
PIF_VALUE: 0
PIF_VALUE: 1
PIF_VALUE: 1
PIF_VALUE: 0
PIF_VALUE: 1
PIF_VALUE: 0
PIF_VALUE: 1
PIF_VALUE: 0
PIF_VALUE: 0
PIF_VALUE: 1
PIF_VALUE: 0
PIF_VALUE: 1
PIF_VALUE: 0
PIF_VALUE: 1
PIF_VALUE: 1
PIF_VALUE: 0
PIF_VALUE: 1
PIF_VALUE: 1
PIF_VALUE: 0
PIF_VALUE: 1
PIF_VALUE: 0
PIF_VALUE: 1
PIF_VALUE: 0
PIF_VALUE: 1
PIF_VALUE: 0
PIF_VALUE: 1
PIF_VALUE: 0
PIF_VALUE: 1
PIF_VALUE: 0
PIF_VALUE: 1
PIF_VALUE: 0
PIF_VALUE: 0
PIF_VALUE: 1
PIF_VALUE: 0
PIF_VALUE: 1
PIF_VALUE: 0
PIF_VALUE: 0
PIF_VALUE: 1
PIF_VALUE: 0
PIF_VALUE: 1

## 2021-10-12 ASSESSMENT — PAIN DESCRIPTION - DESCRIPTORS
DESCRIPTORS: ACHING;DISCOMFORT
DESCRIPTORS: ACHING

## 2021-10-12 ASSESSMENT — PAIN - FUNCTIONAL ASSESSMENT
PAIN_FUNCTIONAL_ASSESSMENT: ACTIVITIES ARE NOT PREVENTED
PAIN_FUNCTIONAL_ASSESSMENT: PREVENTS OR INTERFERES SOME ACTIVE ACTIVITIES AND ADLS
PAIN_FUNCTIONAL_ASSESSMENT: PREVENTS OR INTERFERES SOME ACTIVE ACTIVITIES AND ADLS
PAIN_FUNCTIONAL_ASSESSMENT: 0-10
PAIN_FUNCTIONAL_ASSESSMENT: PREVENTS OR INTERFERES SOME ACTIVE ACTIVITIES AND ADLS

## 2021-10-12 ASSESSMENT — PAIN DESCRIPTION - ONSET: ONSET: ON-GOING

## 2021-10-12 ASSESSMENT — PAIN SCALES - GENERAL
PAINLEVEL_OUTOF10: 7
PAINLEVEL_OUTOF10: 8
PAINLEVEL_OUTOF10: 0
PAINLEVEL_OUTOF10: 0
PAINLEVEL_OUTOF10: 8
PAINLEVEL_OUTOF10: 5

## 2021-10-12 ASSESSMENT — PAIN DESCRIPTION - ORIENTATION
ORIENTATION: LEFT

## 2021-10-12 ASSESSMENT — PAIN DESCRIPTION - PAIN TYPE
TYPE: SURGICAL PAIN

## 2021-10-12 ASSESSMENT — PAIN DESCRIPTION - PROGRESSION
CLINICAL_PROGRESSION: GRADUALLY WORSENING
CLINICAL_PROGRESSION: GRADUALLY WORSENING

## 2021-10-12 ASSESSMENT — PAIN DESCRIPTION - LOCATION
LOCATION: HIP

## 2021-10-12 ASSESSMENT — PAIN DESCRIPTION - FREQUENCY
FREQUENCY: INTERMITTENT

## 2021-10-12 NOTE — PROGRESS NOTES
Pt states that she has 2 steps to go up at the front door and through the kitchen door is 4 steps. Pt lives in a single story home. Pt has personal front wheeled walker at bedside. Pt lives with  and son that are at the home. Pt has grab bars in bathroom. Pt prefers that she received [facility] for PT/OT.

## 2021-10-12 NOTE — ANESTHESIA POSTPROCEDURE EVALUATION
Department of Anesthesiology  Postprocedure Note    Patient: Sindy Barba  MRN: [id number]  YOB: 1950  Date of evaluation: 10/12/2021  Time:  1:41 PM     Procedure Summary     Date: 10/12/21 Room / Location: [address]    Anesthesia Start: 1027 Anesthesia Stop: 1244    Procedure: LEFT HIP TOTAL ARTHROPLASTY POSTERIOR (Left ) Diagnosis:       Primary osteoarthritis of left hip      (Primary osteoarthritis of left hip [M16.12])    Surgeons: Shelia Joyner DO Responsible Provider: Marcie Chen DO    Anesthesia Type: MAC, spinal, regional ASA Status: 3          Anesthesia Type: MAC, spinal, regional    Oskar Phase I: Oskar Score: 9    Oskar Phase II:      Last vitals: Reviewed and per EMR flowsheets.        Anesthesia Post Evaluation    Patient location during evaluation: PACU  Patient participation: complete - patient participated  Level of consciousness: sleepy but conscious  Airway patency: patent  Nausea & Vomiting: no nausea  Complications: no  Cardiovascular status: hemodynamically stable  Respiratory status: acceptable  Hydration status: euvolemic

## 2021-10-12 NOTE — CONSULTS
Consult Note    10/12/2021  Luis E Tadeo  1950    PCP: Tolu Abbasi MD    Cc: Medical management    HPI: Luis E Tadeo is a 65 y.o.  female admitted to hospital post left hip total arthroplasty for primary osteoarthritis. Hospitalist service was consulted for medical management. During my evaluation patient denied any medical complaints including chest pain, shortness of breath, fever/chills, headache, bowel/bladder habit changes. Says her appetite is good. PMHX:   Past Medical History:   Diagnosis Date    Acid reflux     Angina at rest [facility]) In Past    Denies Chest Pain At This Time    Arthritis     \"Knees, Back And Hips\"    Asthma     Broken teeth     Upper    CAD (coronary artery disease)     Sees Dr. Mark Conner a couple of heart stents- had stress test in May 2018 all ok\"    Cancer [facility])     skin cancer on face    Chronic back pain     Degenerative joint disease     Back     Diabetes mellitus ([facility] [place] [address] Dx 2003    GI bleed 11/2009    H/O Doppler venous ultrasound 02/11/2019    No significant insufficiency noted.  H/O echocardiogram 07/14/2014    EF 50%, low normal systolic function, no wall motion abnormalities, mild concentric hypertrophy and signs of diastolic dysfunction, mildly dilated left atrium,  no pericardial effusion, mild mitral and tricuspid regurg.  H/O echocardiogram 11/2219    EF 55-60%,  There is physiological to trace amount of pericardial fluid present.  H/O percutaneous transluminal coronary angioplasty 04/30/2019    LAD stent and PIC of restent stenosis also in LAD.  Hiatal hernia     History of complete ECG     07/20/2012=NSR, leftward axis, prob normal for age, poor r-waveprogression, inferior ST-T abnormalities, consider ischemia    History of echocardiogram 01/26/2017    Ejection fraction is visually estimated at 55%. Mild concentric left ventricular hypertrophy. Mildly dilated left atrium. Mild mitral regurgitation is present.     History of nuclear stress test 01/26/2017    cardiolite-moderate ischemia mid anteroseptal,EF70%    History of nuclear stress test 07/12/2018    Normal    Hx of 24 hour EKG monitoring 12/21/2011    brief run of SVT, otherwise noth clinically sig. arrhythmia noted    Hx of cardiovascular stress test 7/25/14 1/23/12 7/14 EF70% normal study 1/23/12=thallium stress=no scintigraphic inducible myocardial ischemia, EF 70%;  03/2011=EF 70%; 03/2010=EF 70%; 12/1997; 02/1997    Hx of cardiovascular stress test 05/04/2020    Normal stress test    Hx of CT scan     05/08/2011=CT chest with contrast=examination is moderately degraded by respiratory motion. No evidence for acute pulmonary thromboembolic disease.  Dilated main pulmonary artery suggesting elevated pulm artery pressures    Hx of Doppler ultrasound     bilateral carotid 03/11/2011= no significant stenosis present    Hx of echocardiogram     03/11/2011=mildly hypertreophied left ventricle, EF 60%, mild pulm. htn, mild aortic stenosis; 02/1997   [facility] OTHER MEDICAL     Primary Care Physician Is Dr. Dary Ulloa Hyperlipidemia     Hypertension     Hyperthyroidism     Hypothyroidism, adult     Kidney stones Last Episode In 2000's    \"Passed Kidney Stones Three Different Times\"    Left hip pain     \"for months- had hip pain\"for steroid injection 7/5/2016    Migraines     Normal cardiac stress test 05/12/2016    EF70% Normal    ORLIN (obstructive sleep apnea)     doesnt use cpap, uses o2 2l as needed at night    Post PTCA     2008, 2010, 2016    RECEIVED BLOOD TRANSFUSION 11/2009    RECEIVED BLOOD TRANSFUSION, NO REACTION TO THE BLOOD TRANSFUSION RECEIVED    Shortness of breath on exertion     Sleep apnea     NO CPAP- last sleep study done 2011    Teeth missing     Upper And Lower    Ulcer 11/2009    Stomach, GI Bleeding, Received Blood Transfusions    Urinary incontinence     \"wear a pad all the time\"    UTI (urinary tract infection) In Past     No Current Symptoms    Venous US Dup     Wears glasses     Wears partial dentures     Upper       PSHX:  has a past surgical history that includes  section (, ); Finger trigger release (10-09 \"One On Each Hand Trigger Finger Release\"); Diagnostic Cardiac Cath Lab Procedure (2010); Diagnostic Cardiac Cath Lab Procedure (2008); Diagnostic Cardiac Cath Lab Procedure (2008); knee surgery (Right, , ); Dental surgery; [place] tooth extraction; Cholecystectomy (); Tonsillectomy and adenoidectomy (); Shoulder arthroscopy (Right, -10); Tubal ligation (); other surgical history (); Coronary angioplasty (2010); Coronary angioplasty (2008); Coronary angioplasty (2008); Carpal tunnel release (Bilateral,  Right ); knee surgery (Left, ); knee surgery (Right, ); Hysterectomy (); Shoulder arthroscopy (Left, 09/10/14); Colonoscopy (Last Done In ); Colonoscopy (3/23/15); Endoscopy, colon, diagnostic (\"Several\"); Endoscopy, colon, diagnostic (3/23/15); Percutaneous Transluminal Coronary Angio (16); other surgical history (Left, 04/10/2017); Colonoscopy (04/10/2019); Colonoscopy (N/A, 4/10/2019); joint replacement (Right, 8-11); joint replacement (Right, 9-07); joint replacement (Left, 6-03); and joint replacement (Left, 12-13). FAM HX:family history includes Arthritis in her sister; Asthma in her mother; Cancer in her father, mother, and sister; Colon Cancer in her father; Diabetes in her son; Early Death (age of onset: 52) in her sister; Early Death (age of onset: 61) in her sister; Heart Disease in her father, mother, and son; High Blood Pressure in her father, mother, sister, and son; Mental Illness in her daughter; Other in her sister.     Soc HX:   Social History     Socioeconomic History    Marital status:      Spouse name: None    Number of children: None    Years of education: None    Highest education level: None Occupational History    None   Tobacco Use    Smoking status: Former Smoker     Packs/day: 0.25     Years: 38.00     Pack years: 9.50     Types: Cigarettes     Start date: 1962     Quit date: 2000     Years since quittin.1    Smokeless tobacco: Never Used    Tobacco comment: \"never a heavy smoker just occ smoker\"   Vaping Use    Vaping Use: Never used   Substance and Sexual Activity    Alcohol use: No    Drug use: No    Sexual activity: Yes     Partners: Male   Other Topics Concern    None   Social History Narrative    None     Social Determinants of Health     Financial Resource Strain:     Difficulty of Paying Living Expenses:    Food Insecurity:     Worried About Running Out of Food in the Last Year:     Ran Out of Food in the Last Year:    Transportation Needs:     Lack of Transportation (Medical):  Lack of Transportation (Non-Medical):    Physical Activity:     Days of Exercise per Week:     Minutes of Exercise per Session:    Stress:     Feeling of Stress :    Social Connections:     Frequency of Communication with Friends and Family:     Frequency of Social Gatherings with Friends and Family:     Attends Voodoo Services:     Active Member of Clubs or Organizations:     Attends Club or Organization Meetings:     Marital Status:    Intimate Partner Violence:     Fear of Current or Ex-Partner:     Emotionally Abused:     Physically Abused:     Sexually Abused:        Meds - list of home medications reviewed in electronic chart and confirmed    Allergies   Allergen Reactions    Codeine Nausea Only       ROS: As noted above in HPI, otherwise complete system review gone over with patient and is otherwise negative. EXAM:  Blood pressure 117/71, pulse 70, temperature 97.7 °F (36.5 °C), temperature source Oral, resp. rate 18, height 5' 2\" (1.575 m), weight 241 lb 1 oz (109.3 kg), SpO2 96 %, not currently breastfeeding.     GEN awake alert oriented x3  HEENT NC/AT, PERRLA, EOMI +  Lungs B/L clear with no rales/rhonchi/wheeze  Heart RRR, S1/, S2 heard no M/R/G  Abdomen S/NT/ND/BS +  Extremities no edema/clubbing/cyanosis  Musculoskeletal left hip postop changes +  Neuro nonfocal exam  Skin warm dry with no rashes      LABS:  Labs Reviewed   POCT GLUCOSE - Abnormal; Notable for the following components:       Result Value    POC Glucose 217 (*)     All other components within normal limits   POCT GLUCOSE - Abnormal; Notable for the following components:    POC Glucose 221 (*)     All other components within normal limits         ASSESSMENT AND PLAN:    Patient Active Problem List   Diagnosis Code    Chest pain R07.9    Pulmonary arterial hypertension ([facility]) I27.21    Disease of thyroid gland E07.9    Hyperlipidemia E78.5    Hypertension I10    Diabetes mellitus ([facility]) E11.9    CAD (coronary artery disease) I25.10    Dyspnea on exertion R06.00    Arthritis of left hip M16.12    Type 2 diabetes mellitus without complication ([facility]) F99.2    S/P PTCA (percutaneous transluminal coronary angioplasty) Z98.61    Arthritis M19.90    Radial styloid tenosynovitis of right hand M65.4    S/P cardiac cath Z98.890    Kidney disorder N28.9    Mental disorder F99    Obstructive sleep apnea G47.33    Change in bowel function R19.8    Percutaneous transluminal coronary angioplasty (PTCA) within last 14 to 24 months Z98.61    Unstable angina ([facility]) I20.0    Status post angioplasty Z98.62    Restrictive lung disease secondary to obesity J98.4, E66.9    Nocturnal hypoxia G47.34    Morbid obesity with BMI of 40.0-44.9, adult ([facility]) E66.01, Z68.41    History of left hip replacement Z96.642       Plan:    Left hip osteoarthritis s/p left hip total arthroplasty postop day 0. Pain control, PT/OT and DVT prophylaxis per Ortho. COPD/chronic hypoxic respiratory failure stable. Continue home nebs/inhalers. Diabetes mellitus type 2 restart home Metformin and glipizide.   Follow Accu-Cheks  HTN continue home HCTZ, Coreg dose. Hold ACEI for now  CAD s/p PTCA and stenting continue DAPT, statin, Coreg, Ranexa. Stable  GERD continue home Protonix. CODE STATUS full code. Care plan discussed with patient. Understood and agreed. Electronically signed by Cammy Heller MD on 10/12/2021 at 5:20 PM    Thanks for consult. Will follow with you.

## 2021-10-12 NOTE — BRIEF OP NOTE
Brief Postoperative Note      Patient: Jb Rodriguez  YOB: 1950  MRN: [id number]    Date of Procedure: 10/12/2021    Pre-Op Diagnosis: Primary osteoarthritis of left hip [M16.12]    Post-Op Diagnosis: Same       Procedure(s):  LEFT HIP TOTAL ARTHROPLASTY POSTERIOR    Surgeon(s):  Koko Rothman DO    Assistant:  Physician Assistant: Juliana Pizarro PA-C    Anesthesia: General    Estimated Blood Loss (mL): less than 844     Complications: None    Specimens:   * No specimens in log *    Implants:  Implant Name Type Inv.  Item Serial No.  Lot No. LRB No. Used Action   SHELL ACET SZ E [id number] 3 H OSSEOTI LIMIT H 2 MOBILITY G7  SHELL ACET SZ E [id number] 3 H OSSEOTI LIMIT H 2 MOBILITY G7  [employer]- [id number] Left 1 Implanted   BONE SCREW 6.5X30 SELF-TAP  BONE SCREW 6.5X30 SELF-TAP  [employer]- [id number] Left 1 Implanted   BONE SCREW 6.5X25 SELF-TAP  BONE SCREW 6.5X25 SELF-TAP  [employer]- [id number] Left 1 Implanted   LINER ACET N CONSTRN E NEUT 36 MM HIP POLYETH LONGEVITY G7  LINER ACET N CONSTRN E NEUT 36 MM HIP POLYETH LONGEVITY G7  [employer] ETEX KATARZYNA- [id number] Left 1 Implanted   SCREW SPNL L12MM DIA4MM ST ENMANUEL CONSTRN CETRA  SCREW SPNL L12MM DIA4MM ST ENMANUEL CONSTRN CETRA  [employer]- [id number] Left 1 Implanted   IMPL HIP HEAD FEM BIOLOX -6 NECK TAPR Hip IMPL HIP HEAD FEM BIOLOX -6 NECK TAPR  [employer]-PMM [id number] Left 1 Implanted   HEAD FEM [id number] HIP BIOLOX DELT OPT FOR G7 ACET SYS  HEAD FEM [id number] HIP BIOLOX DELT OPT FOR G7 ACET SYS  [employer]- [id number] Left 1 Implanted         Drains: * No LDAs found *    Findings: L hip OA    Electronically signed by Dorothy Walter DO on 10/12/2021 at 12:02 PM

## 2021-10-12 NOTE — PROGRESS NOTES
Veronica Bee is a 65 y.o. female patient. No diagnosis found. Past Medical History:   Diagnosis Date    Acid reflux     Angina at rest [facility]) In Past    Denies Chest Pain At This Time    Arthritis     \"Knees, Back And Hips\"    Asthma     Broken teeth     Upper    CAD (coronary artery disease)     Sees Dr. Rm Slater a couple of heart stents- had stress test in May 2018 all ok\"    Cancer [facility])     skin cancer on face    Chronic back pain     Degenerative joint disease     Back     Diabetes mellitus ([address] [address] Dx 2003    GI bleed 11/2009    H/O Doppler venous ultrasound 02/11/2019    No significant insufficiency noted.  H/O echocardiogram 07/14/2014    EF 50%, low normal systolic function, no wall motion abnormalities, mild concentric hypertrophy and signs of diastolic dysfunction, mildly dilated left atrium,  no pericardial effusion, mild mitral and tricuspid regurg.  H/O echocardiogram 11/2219    EF 55-60%,  There is physiological to trace amount of pericardial fluid present.  H/O percutaneous transluminal coronary angioplasty 04/30/2019    LAD stent and PIC of restent stenosis also in LAD.  Hiatal hernia     History of complete ECG     07/20/2012=NSR, leftward axis, prob normal for age, poor r-waveprogression, inferior ST-T abnormalities, consider ischemia    History of echocardiogram 01/26/2017    Ejection fraction is visually estimated at 55%. Mild concentric left ventricular hypertrophy. Mildly dilated left atrium. Mild mitral regurgitation is present.  History of nuclear stress test 01/26/2017    cardiolite-moderate ischemia mid anteroseptal,EF70%    History of nuclear stress test 07/12/2018    Normal    Hx of 24 hour EKG monitoring 12/21/2011    brief run of SVT, otherwise noth clinically sig.  arrhythmia noted    Hx of cardiovascular stress test 7/25/14 1/23/12 7/14 EF70% normal study 1/23/12=thallium stress=no scintigraphic inducible myocardial ischemia, EF 70%; 03/2011=EF 70%; 03/2010=EF 70%; 12/1997; 02/1997    Hx of cardiovascular stress test 05/04/2020    Normal stress test    Hx of CT scan     05/08/2011=CT chest with contrast=examination is moderately degraded by respiratory motion. No evidence for acute pulmonary thromboembolic disease. Dilated main pulmonary artery suggesting elevated pulm artery pressures    Hx of Doppler ultrasound     bilateral carotid 03/11/2011= no significant stenosis present    Hx of echocardiogram     03/11/2011=mildly hypertreophied left ventricle, EF 60%, mild pulm. htn, mild aortic stenosis; 02/1997   [facility] OTHER MEDICAL     Primary Care Physician Is Dr. Rosie Apodaca Hyperlipidemia     Hypertension     Hyperthyroidism     Hypothyroidism, adult     Kidney stones Last Episode In 2000's    \"Passed Kidney Stones Three Different Times\"    Left hip pain     \"for months- had hip pain\"for steroid injection 7/5/2016    Migraines     Normal cardiac stress test 05/12/2016    EF70% Normal    ORLIN (obstructive sleep apnea)     doesnt use cpap, uses o2 2l as needed at night    Post PTCA     2008, 2010, 2016    RECEIVED BLOOD TRANSFUSION 11/2009    RECEIVED BLOOD TRANSFUSION, NO REACTION TO THE BLOOD TRANSFUSION RECEIVED    Shortness of breath on exertion     Sleep apnea     NO CPAP- last sleep study done 2011    Teeth missing     Upper And Lower    Ulcer 11/2009    Stomach, GI Bleeding, Received Blood Transfusions    Urinary incontinence     \"wear a pad all the time\"    UTI (urinary tract infection) In Past     No Current Symptoms    Venous US Dup     Wears glasses     Wears partial dentures     Upper     No past surgical history pertinent negatives on file.   Scheduled Meds:   tranexamic acid-NaCl  1,000 mg IntraVENous On Call to OR    sodium chloride flush  10 mL IntraVENous 2 times per day    acetaminophen  650 mg Oral Q6H    sennosides-docusate sodium  1 tablet Oral BID    rivaroxaban  10 mg Oral Daily    ceFAZolin (ANCEF) IVPB  2,000 mg IntraVENous Q8H     Continuous Infusions:   lactated ringers 125 mL/hr at 10/12/21 1254    sodium chloride       PRN Meds:sodium chloride flush, sodium chloride, oxyCODONE **OR** oxyCODONE, promethazine **OR** ondansetron, magnesium hydroxide, ketorolac    Allergies   Allergen Reactions    Codeine Nausea Only     Active Problems:    History of left hip replacement  Resolved Problems:    * No resolved hospital problems. *    Blood pressure 117/71, pulse 70, temperature 97.7 °F (36.5 °C), temperature source Oral, resp. rate 18, height 5' 2\" (1.575 m), weight 241 lb 1 oz (109.3 kg), SpO2 96 %, not currently breastfeeding. Subjective   Patient seen and examined, resting in bed comfortably, pain controlled, no new complaints. Spinal still working    Objective   LLE - Dressing clean, dry, intact, no calf TTP, compartments soft,     Assessment & Plan  POD #0 L HANANE, Doing well postoperatively    Continue weight bearing as tolerated.   Continue range of motion exercises  Pain control  DVT prophylaxis  Continue PT/OT  Discharge planning for home tomorrow    Ale Benedict DO  10/12/2021

## 2021-10-12 NOTE — OP NOTE
DATE OF PROCEDURE:  10/12/2021     PREOPERATIVE DIAGNOSIS:  Left hip DJD. POSTOPERATIVE DIAGNOSIS:  Left hip DJD. PROCEDURE:  Left total hip arthroplasty using Biomet G7 size 52  acetabular cup with 2 screwswith 36 neutral liner with Echo Micro size 12 press-fit   femoral stem and size 36 - 6 ceramic femoral head. SURGEON:  Chante Epps DO.     FIRST ASSISTANT: JASPAL Gomez    ANESTHESIA:  Spinal with block. ESTIMATED BLOOD LOSS:  100 mL. FLUIDS:  1000 mL crystalloids. INDICATIONS OF PROCEDURE:  The patient is a 66-year-old female with  long standing history of left hip pain. For that, she underwent  conservative treatment with no relief of symptoms. X-ray revealed  severe arthritis and in the left hip. Give her persistent worsening  symptoms by conservative treatment with her x-ray findings, I  recommended surgical treatment. I explained the risks, benefits and  possible complications of the procedure to the patient and after  answering all of her questions, she consented to undergo the above  procedure. DESCRIPTION OF PROCEDURE:  The patient was seen and evaluated in the  preoperative holding area where the left lower extremity was signed  in her presence. At this point, care of the patient was turned over  to anesthesia team, who performed a regional block to the left lower  extremity. She then transported back to the operative suite. Spinal  anesthesia was performed and once adequate anesthesia was obtained, she was  placed in the lateral decubitus position. The left lower extremity was  prepped and draped in usual sterile fashion. Preoperative antibiotics  were administered. At this point, a time-out was performed and all in attendance were in agreement. I marked out the planned surgical incision overlying the lateral  aspect of the left hip to perform a standard anterior lateral  approach to the left hip. I then made an incision with location.   I  carried proximal femur. I had my assitant externally rotate and adduct the leg expose to the proximal femur. I then placed retractors along  the proximal femur for complete exposure. I then debrided of the  femoral neck as well as soft tissue around the tip of the greater  trochanter. I then inserted the canal finding reamer. I then used a  Rat tail rasp in the canal in the lateralized position. I then began broaching  first with a size 7 broach. I was able to broach up to a size of 12 which fit snuggly within the femoral canal down to the appropriate height. This  was then selected for the final implant component. I then inserted  the final Biomet Echo Micro size 12 press-fit femoral stem which was  maletted in place until it was firmly seated. I then inserted a trial size  36 - 6 femoral head. I then had my assistant perform traction and  internal rotation. I then reduced the hip within the acetabulum. I  then had my assistant run the hip through a full range of motion and  found there to be excellent stability throughout range of motion with no evidence of impingement and equal leg lengths compared to contralateral side. This was then  selected for the final implant component. I then had my assistant  perform traction and external rotation as I dislocated the hip out of the  acetabulum. The trial head was then removed. I then irrigated the  acetabulum further with pulse lavage. I then inserted the final size  36 - 6 ceramic femoral head, which was malleted in place. I then had  my assistant perform traction and internal rotation as I reduced  the hip within the acetabulum. My assistant then ran the hip through  a full range of motion and we found there to be excellent stability  throughout range of motion with no evidence of impingement and equal  leg lengths compared to contralateral side. The wound was then irrigated further with pulse lavage. I injected 1 gram of TXA around the capsule.   I then closed  the deep capsule layer using #5 Ethibond suture in a running fashion. I then closed the gluteus medius layer using #5 Ethibond suture in a  running fashion. I then closed the IT band using #1 Vicryl suture in  a figure-of-eight fashion. I then closed subcutaneous tissues using  2-0 Vicryl suture followed by skin closure with stratafix and prineo. Adequate  hemostasis was maintained at all times. I then applied a sterile soft  dressing to the left lower extremity and the patient was then  awakened from anesthesia and transported to PACU in stable condition. She appeared to have tolerated the procedure well. PROGNOSIS:  At this point, she will be admitted to the hospital  for postoperative pain control, rehabilitation and medical monitoring. Hospitalist was consulted for medical management and physical therapy  will be consulted for gait training and she can be weightbearing as  tolerated on the left leg. She will receive antibiotic prophylaxis  and DVT prophylaxis during her hospitalization. Following her discharge,  I will see her back in the office in 3 weeks and will continue to monitor her progress in the outpatient setting for resolution of her symptoms.            Chace 97, DO

## 2021-10-12 NOTE — PROGRESS NOTES
1240 - transferred from OR on bed, monitor applied, alarms on and verified, bedside handoff provided by Ed Issa and Roberta Watson CRNA  1258 - X-rays obtained bedside  1310 - tolerating ice chips  1315 - patient turned, repositioned, and linens changed; patient tolerated well  1326 - phase one care complete; report called to Jose Manuel Alonzo

## 2021-10-12 NOTE — ANESTHESIA PROCEDURE NOTES
Peripheral Block    Patient location during procedure: pre-op  Start time: 10/12/2021 9:27 AM  End time: 10/12/2021 9:34 AM  Staffing  Performed: anesthesiologist   Anesthesiologist: Vonnie French DO  Preanesthetic Checklist  Completed: patient identified, IV checked, site marked, risks and benefits discussed, surgical consent, monitors and equipment checked, pre-op evaluation, timeout performed, anesthesia consent given, oxygen available and patient being monitored  Peripheral Block  Patient position: supine  Prep: ChloraPrep  Patient monitoring: cardiac monitor, continuous pulse ox, frequent blood pressure checks and IV access  Block type: Fascia iliaca  Laterality: left  Injection technique: single-shot  Guidance: ultrasound guided  Local infiltration: lidocaine  Infiltration strength: 1 %  Dose: 3 mL  Provider prep: mask and sterile gloves  Local infiltration: lidocaine  Needle  Needle type: combined needle/nerve stimulator   Needle gauge: 21 G  Needle length: 8 cm  Needle localization: ultrasound guidance  Assessment  Injection assessment: negative aspiration for heme, no paresthesia on injection and local visualized surrounding nerve on ultrasound  Paresthesia pain: none  Slow fractionated injection: yes  Hemodynamics: stable  Additional Notes  U/S [id number].  (1) Under ultrasound guidance, a 21 gauge needle was inserted and placed beneath the fascia iliaca.  (2) Ultrasound was also used to visualize the spread of the anesthetic beneath the fascia iliaca in both directions towards all three target nerves. (3) The nerves appeared anatomically normal, and (4 there were no apparent abnormal pathological findings on the image that were readily visible and related to the nerve being blocked. (5) A permanent ultrasound image was saved in the patient's record.             Medications Administered  Ropivacaine (NAROPIN) injection 0.5%, 30 mL  Reason for block: post-op pain management and at surgeon's request

## 2021-10-12 NOTE — ANESTHESIA PROCEDURE NOTES
Spinal Block    Patient location during procedure: OR  Start time: 10/12/2021 10:30 AM  End time: 10/12/2021 10:50 AM  Reason for block: procedure for pain, post-op pain management, primary anesthetic and at surgeon's request  Staffing  Performed: resident/CRNA   Anesthesiologist: Ernesto Ross DO  Resident/CRNA: YOBANI Noriega - EDNA  Preanesthetic Checklist  Completed: patient identified, IV checked, site marked, risks and benefits discussed, surgical consent, monitors and equipment checked, pre-op evaluation, timeout performed, anesthesia consent given, oxygen available and patient being monitored  Spinal Block  Patient position: sitting  Prep: ChloraPrep  Patient monitoring: cardiac monitor, continuous pulse ox and frequent blood pressure checks  Approach: midline  Location: L4/L5  Guidance: paresthesia technique  Provider prep: mask and sterile gloves  Local infiltration: lidocaine  Agent: bupivacaine  Dose: 2.4  Dose: 2.4  Needle  Needle type: Quincke   Needle gauge: 22 G  Needle length: 3.5 in  Assessment  Sensory level: T8  Swirl obtained: Yes  CSF: clear  Attempts: 2  Hemodynamics: stable

## 2021-10-12 NOTE — PLAN OF CARE
Problem: Falls - Risk of:  Goal: Will remain free from falls  Description: Will remain free from falls  Outcome: Ongoing  Goal: Absence of physical injury  Description: Absence of physical injury  Outcome: Ongoing     Problem: Pain:  Goal: Pain level will decrease  Description: Pain level will decrease  Outcome: Ongoing  Goal: Control of acute pain  Description: Control of acute pain  Outcome: Ongoing  Goal: Control of chronic pain  Description: Control of chronic pain  Outcome: Ongoing     Problem:  Activity:  Goal: Ability to ambulate will improve  Description: Ability to ambulate will improve  Outcome: Ongoing     Problem: Safety:  Goal: Ability to appropriately use an adaptive device for ambulation will improve  Description: Ability to appropriately use an adaptive device for ambulation will improve  Outcome: Ongoing  Goal: Ability to safely transfer will improve  Description: Ability to safely transfer will improve  Outcome: Ongoing

## 2021-10-12 NOTE — H&P
Subjective:      Patient ID: Kike Lilly is a 65 y.o. female.     Patient states that she has had L Hip pain for a few years. Injections have helped temporarily. Was told by Dr. Nneka Vazquez that she would need a replacement at some point. Patient says pain starts in joint and wraps around to groin and goes into thigh.      She comes in today for evaluation of her left hip pain. She states that she has been dealing with sharp, stabbing pain in her left hip and groin over the past several years but it has progressively worsened over the past 6 months. She states that now she is having difficulty with her daily activities or walking due to the pain. Patient denies any new injury to the involved extremity/ joint, denies numbness or tingling in the involved extremity and denies fever or chills.              Review of Systems   Constitutional: Negative for activity change, chills and fever. Respiratory: Negative for chest tightness. Cardiovascular: Negative for chest pain. Musculoskeletal: Positive for arthralgias, gait problem and myalgias. Negative for back pain and joint swelling. Skin: Negative for color change, pallor, rash and wound. Neurological: Negative for weakness and numbness.         Past Medical History        Past Medical History:   Diagnosis Date    Acid reflux      Angina at rest [facility]) In Past     Denies Chest Pain At This Time    Arthritis       \"Knees, Back And Hips\"    Asthma      Broken teeth       Upper    CAD (coronary artery disease)       Sees Dr. Jacobo Party a couple of heart stents- had stress test in May 2018 all ok\"    Chronic back pain      Degenerative joint disease       Back     [facility]    GI bleed 11/2009    H/O Doppler venous ultrasound 02/11/2019     No significant insufficiency noted.     H/O echocardiogram 07/14/2014     EF 50%, low normal systolic function, no wall motion abnormalities, mild concentric hypertrophy and signs of diastolic dysfunction, mildly dilated left atrium,  no pericardial effusion, mild mitral and tricuspid regurg.  H/O echocardiogram 11/2219     EF 55-60%,  There is physiological to trace amount of pericardial fluid present.  H/O percutaneous transluminal coronary angioplasty 04/30/2019     LAD stent and PIC of restent stenosis also in LAD.  Hiatal hernia      History of complete ECG       07/20/2012=NSR, leftward axis, prob normal for age, poor r-waveprogression, inferior ST-T abnormalities, consider ischemia    History of echocardiogram 01/26/2017     Ejection fraction is visually estimated at 55%. Mild concentric left ventricular hypertrophy. Mildly dilated left atrium. Mild mitral regurgitation is present.  History of nuclear stress test 01/26/2017     cardiolite-moderate ischemia mid anteroseptal,EF70%    History of nuclear stress test 07/12/2018     Normal    Hx of 24 hour EKG monitoring 12/21/2011     brief run of SVT, otherwise noth clinically sig. arrhythmia noted    Hx of cardiovascular stress test 7/25/14 1/23/12 7/14 EF70% normal study 1/23/12=thallium stress=no scintigraphic inducible myocardial ischemia, EF 70%;  03/2011=EF 70%; 03/2010=EF 70%; 12/1997; 02/1997    Hx of cardiovascular stress test 05/04/2020     Normal stress test    Hx of CT scan       05/08/2011=CT chest with contrast=examination is moderately degraded by respiratory motion. No evidence for acute pulmonary thromboembolic disease.  Dilated main pulmonary artery suggesting elevated pulm artery pressures    Hx of Doppler ultrasound       bilateral carotid 03/11/2011= no significant stenosis present    Hx of echocardiogram       03/11/2011=mildly hypertreophied left ventricle, EF 60%, mild pulm. htn, mild aortic stenosis; 02/1997    HX OTHER MEDICAL       Primary Care Physician Is Dr. Elizabeth Cordero    Hyperlipidemia      Hypertension      Hyperthyroidism      Hypothyroidism, adult      Kidney stones Last Episode In 2000's     \"Passed Kidney Stones Three Different Times\"    Left hip pain       \"for months- had hip pain\"for steroid injection 7/5/2016    Migraines      Normal cardiac stress test 05/12/2016     EF70% Normal    ORLIN (obstructive sleep apnea)       doesnt use cpap, uses o2 2l as needed at night    Post PTCA       2008, 2010, 2016    RECEIVED BLOOD TRANSFUSION 11/2009     RECEIVED BLOOD TRANSFUSION, NO REACTION TO THE BLOOD TRANSFUSION RECEIVED    Shortness of breath on exertion      Sleep apnea       NO CPAP- last sleep study done 2011    Teeth missing       Upper And Lower    Ulcer 11/2009     Stomach, GI Bleeding, Received Blood Transfusions    Urinary incontinence       \"wear a pad all the time\"    UTI (urinary tract infection) In Past      No Current Symptoms    Venous US Dup      Wears glasses      Wears partial dentures       Upper            No current facility-administered medications on file prior to encounter.      Current Outpatient Medications on File Prior to Encounter   Medication Sig Dispense Refill    albuterol sulfate  (90 Base) MCG/ACT inhaler INHALE 2 PUFFS INTO THE LUNGS EVERY 6 HOURS AS NEEDED FOR WHEEZING 76.5 g 3    omeprazole (PRILOSEC) 20 MG delayed release capsule Take 20 mg by mouth daily      famotidine (PEPCID) 20 MG tablet       SYMBICORT 160-4.5 MCG/ACT AERO INHALE 2 PUFFS INTO THE LUNGS TWICE DAILY 1 Inhaler 5    Ferrous Sulfate (IRON) 28 MG TABS Take by mouth daily       ranolazine (RANEXA) 500 MG extended release tablet Take 1 tablet by mouth 2 times daily 60 tablet 5    pantoprazole (PROTONIX) 40 MG tablet Take 40 mg by mouth daily      metFORMIN (GLUCOPHAGE) 500 MG tablet TK 2 T PO QAM AND 3 T PO QPM WITH MEALS 60 tablet 11    quinapril (ACCUPRIL) 40 MG tablet Take 1 tablet by mouth nightly 90 tablet 3    carvedilol (COREG) 12.5 MG tablet Take 1 tablet by mouth 2 times daily (with meals) (Patient taking differently: Take 6.25 mg by mouth 2 times daily (with meals) ) 1180 tablet 3    atorvastatin (LIPITOR) 20 MG tablet Take 1 tablet by mouth nightly 90 tablet 3    hydrochlorothiazide (HYDRODIURIL) 25 MG tablet Take 1 tablet by mouth daily Takes 12.5 daily 1/2 of a 25 mg tablet 90 tablet 3    B Complex Vitamins (VITAMIN B COMPLEX PO) Take by mouth every morning       vitamin D (CHOLECALCIFEROL) 1000 UNIT TABS tablet Take 1,000 Units by mouth daily      ondansetron (ZOFRAN) 4 MG tablet Take 1 tablet by mouth every 8 hours as needed for Nausea or Vomiting 30 tablet 3    levothyroxine (SYNTHROID) 150 MCG tablet Take 150 mcg by mouth nightly.  Lancets (ONETOUCH DELICA PLUS [id number]) MISC USE AS DIRECTED TWICE DAILY      ONETOUCH ULTRA strip TEST AS DIRECTED TWICE DAILY      doxycycline hyclate (VIBRAMYCIN) 100 MG capsule       Spacer/Aero-Holding Chambers HEATHER 1 Device by Does not apply route daily as needed (use with albuterol rescue inhaler) 1 Device 0    clopidogrel (PLAVIX) 75 MG tablet Take 1 tablet by mouth daily 30 tablet 3    OXYGEN Inhale into the lungs      meclizine (ANTIVERT) 25 MG tablet TK 1 T PO  Q 4 H PRF  DIZZINESS  3    aspirin 81 MG tablet Take 81 mg by mouth every morning. Last Dose Taken -14 Due To Scheduled Surgery      glipiZIDE (GLUCOTROL) 5 MG tablet Take 5 mg by mouth 2 times daily (before meals).          Allergies   Allergen Reactions    Codeine Nausea Only     Past Surgical History:   Procedure Laterality Date    CARPAL TUNNEL RELEASE Bilateral  Right      Left     SECTION  1975    Tubal Ligation Also Done In  With    [address]  Last Done In     COLONOSCOPY  3/23/15    Internal hemorrhoids    COLONOSCOPY  04/10/2019    COLONOSCOPY N/A 4/10/2019    COLONOSCOPY POLYPECTOMY SNARE/COLD BIOPSY performed by Carmita Garvey MD at [place] [address]  2010    balloon angioplasty of distal mid LAD;     CORONARY ANGIOPLASTY 2008    LAD stent 2.75x16 taxus    CORONARY ANGIOPLASTY  2008    2. 5x8 taxus stent to the ostium of the circ.  DENTAL SURGERY      Teeth Extracted In Past    DIAGNOSTIC CARDIAC CATH LAB PROCEDURE  2010    balloon angioplasty of distal mid LAD;     DIAGNOSTIC CARDIAC CATH LAB PROCEDURE  2008    LAD stent 2.75x16 taxus    DIAGNOSTIC CARDIAC CATH LAB PROCEDURE  2008    2. 5x8 taxus stent to the ostium of the circ.  ENDOSCOPY, COLON, DIAGNOSTIC  \"Several\"    ENDOSCOPY, COLON, DIAGNOSTIC  3/23/15    small hiatal hernia, anatomy consistent with banding, gastritis    FINGER TRIGGER RELEASE  10-09 \"One On Each Hand Trigger Finger Release\"     \"Left Middle Finger Trigger Finger Release\"    HYSTERECTOMY  1982    Partial Abdominal Hysterectomy, \"Pinned The Bladder Up\"    JOINT REPLACEMENT Right     Total Right Hip    JOINT REPLACEMENT Right     Total Right Knee    JOINT REPLACEMENT Left     Total Left Knee    JOINT REPLACEMENT Left     Revision Total Left Knee    KNEE SURGERY Right ,     KNEE SURGERY Left     Left Knee Bone Graft    KNEE SURGERY Right     Right Knee Bone Graft    OTHER SURGICAL HISTORY      \"Stomach Stapling\" Pre Surgery Weight Was 230 lbs    OTHER SURGICAL HISTORY Left 04/10/2017    left hip steroid injection    PTCA  16    Stenting of the LAD.     SHOULDER ARTHROSCOPY Right 2-10    SHOULDER ARTHROSCOPY Left 09/10/14    TONSILLECTOMY AND ADENOIDECTOMY  1964    TUBAL LIGATION      Done With     WISDOM TOOTH EXTRACTION      All Four Sylvester Teeth Extracted In Past     Social History     Tobacco Use    Smoking status: Former Smoker     Packs/day: 0.25     Years: 38.00     Pack years: 9.50     Types: Cigarettes     Start date: 1962     Quit date: 2000     Years since quittin.1    Smokeless tobacco: Never Used    Tobacco comment: \"never a heavy smoker just occ smoker\"   Vaping Use    Vaping Use: Never used   Substance Use Topics    Alcohol use: No    Drug use: No     Family History   Problem Relation Age of Onset    Cancer Mother         Liver Cancer    Heart Disease Mother     High Blood Pressure Mother     Asthma Mother     Cancer Father         Colon Cancer    Heart Disease Father     High Blood Pressure Father     Colon Cancer Father     Arthritis Sister     Early Death Sister 52    High Blood Pressure Sister     Other Sister         \"Breathing Problem\"    Heart Disease Son         Open Heart Surgery    Diabetes Son     High Blood Pressure Son     Mental Illness Daughter         Bipolar    Early Death Sister 61        Liver Cancer    Cancer Sister         Liver Cancer    Stomach Cancer Neg Hx        Objective:   Physical Exam  Constitutional:       Appearance: She is well-developed. HENT:      Head: Normocephalic. Eyes:      Pupils: Pupils are equal, round, and reactive to light. Pulmonary:      Effort: Pulmonary effort is normal.   Musculoskeletal:         General: Tenderness present. No deformity. Cervical back: Normal range of motion. Right hip: No deformity, lacerations, tenderness, bony tenderness or crepitus. Normal range of motion. Normal strength. Left hip: Tenderness and bony tenderness present. No deformity, lacerations or crepitus. Decreased range of motion. Normal strength. Right knee: Normal.      Left knee: Normal.   Skin:     General: Skin is warm and dry. Capillary Refill: Capillary refill takes less than 2 seconds. Coloration: Skin is not pale. Findings: No erythema or rash. Neurological:      Mental Status: She is alert and oriented to person, place, and time. Left hip-Skin intact with no erythema, ecchymosis or lacerations present.   Severe pain in the left hip and groin with range of motion of the left hip.     XRAY  X-ray 2 views of the left hip from May 13, 2021 reviewed by me today in the office demonstrates age appropriate bone density throughout with moderate to severe degenerative changes with joint space collapse, no subluxation or dislocation noted, incidentally is noted that there is a well-positioned right total hip arthroplasty with no signs of loosening or wear, no acute osseous abnormalities.      BP (!) 152/74   Pulse 86   Temp 96.8 °F (36 °C) (Temporal)   Resp 18   SpO2 97%     Assessment:   Left hip DJD, moderate to severe                Plan:   I discussed with her today her x-ray findings. I explained to her that she does have severe arthritis in the left hip. At this point given her persistent worsening symptoms despite conservative treatment and with her x-ray findings I recommend surgical treatment. I  had a lengthy discussion with her today in regards to left total hip replacement surgery. I explained risks, benefits, possible complications of the procedure and answered all questions for the patient. I explained postoperative rehabilitation protocol and expectations with the patient today. The patient understands and consents to the procedure. Patient will follow up with their primary care physician prior to surgical treatment for preoperative clearance. We will schedule surgery at soonest convenience. Continue weight-bearing as tolerated. Continue range of motion exercises as instructed. Ice and elevate as needed. Tylenol or Motrin for pain. Follow up in 3 weeks postop. She would like to spend the night in the hospital after her surgery.      The patient was counseled at length about the risks of huang Covid-19 during their perioperative period and any recovery window from their procedure. The patient was made aware that huang Covid-19  may worsen their prognosis for recovering from their procedure  and lend to a higher morbidity and/or mortality risk.   All material risks, benefits, and reasonable alternatives including postponing the procedure were discussed. The patient does wish to proceed with the procedure at this time.       Pt seen and examined, No change in H+P.                  JAMAL GIBBONS, DO

## 2021-10-12 NOTE — CONSULTS
[facility], 1950, [id number], 10/12/2021    Discharge Recommendation: Inpatient Rehabilitation      History:  [place]:  There were no encounter diagnoses. Subjective:  Patient states: Agreeable to therapy. Pain: Pt reports 5/10 pain at surgical site. Communication with other providers: PT, RN  Restrictions: General Precautions, Fall Risk, LLE Anterior Hip Precautions, WBAT    Home Setup/Prior level of function:  Social/Functional History  Lives With: Spouse  Type of Home: House  Home Layout: One level  Home Access: Stairs to enter with rails  Entrance Stairs - Number of Steps: 4  Bathroom Shower/Tub: Tub/Shower unit  Bathroom Toilet: Handicap height  Bathroom Equipment: Shower chair  Home Equipment: Lift chair, Rolling walker, 4 wheeled walker, Cane  ADL Assistance: Independent  Homemaking Assistance: Needs assistance  Ambulation Assistance: Independent (mod I with 4ww in community; mod I with spc in house)  Transfer Assistance: Independent    Examination:  · Observation: Supine in bed upon arrival  · Vision: [facility]  · Hearing: [facility]  · Vitals: Stable vitals throughout session    Body Systems and functions:  · ROM: WFL   · Strength: WFL   · Sensation: WFL  · Tone: Normal  · Coordination: WFL  · Perception: WNL    Activities of Daily Living (ADLs):  · Feeding: Eloy  setup and increased time. · Grooming: Gela in standing with assist for dynamic standing balance, setup, increased time, VC.   · UB bathing: Eloy  in seated with setup and increased time. · LB bathing: ModA in seated with assist for distal reaching, setup, increased time, VC.   · UB dressing: Eloy  in seated with setup and increased time. · LB dressing: MaxA pt required assistance to bailee socks while seated upright at EOB this date.  Pt required assistance to doff depend in standing, thread depend in seated, and assistance for dynamic standing balance when managing depend in standing. · Toileting: ModA pt performed SPT to [facility] with assistance for transfer and LB clothing management. Pt demo good ability to complete meghana care this date. Cognitive and Psychosocial Functioning:  · Overall cognitive status: Oriented to time, date, person, place, city  · Affect: Normal     Balance:   · Sitting: SBA (pt sat EOB demo fair dynamic sitting balance). · Standing: CGA-ModA (pt stood with RW. Demo fair- to fair+ dynamic standing balance). Functional Mobility:   · Bed Mobility: ModAx2 (pt performed supine to seated bed mobility with assist for BLE positioning, trunk support, and VC for sequencing throughout). · Transfers:   · Gela-ModA  (pt performed STS from EOB, chair and BSC with RW. Required VC throughout for sequencing and increased time). · Gela (SPT from EOB to chair, chair to [facility], and BSC to chair with RW). · Ambulation: Gela (pt took side steps along EOB with Gela and assistance for RW management). AM-PAC 6 click short form for inpatient daily activity:   How much help from another person does the patient currently need. .. Unable  Dep A Lot  Max A A Lot   Mod A A Little  Min A A Little   CGA  SBA None   Mod I  Indep  Sup   1. Putting on and taking off regular lower body clothing? [] 1    [x] 2   [] 2   [] 3   [] 3   [] 4      2. Bathing (including washing, rinsing, drying)? [] 1   [] 2   [x] 2 [] 3 [] 3 [] 4   3. Toileting, which includes using toilet, bedpan, or urinal? [] 1    [] 2   [x] 2   [] 3   [] 3   [] 4     4. Putting on and taking off regular upper body clothing? [] 1   [] 2   [] 2   [] 3   [] 3    [x] 4      5. Taking care of personal grooming such as brushing teeth? [] 1   [] 2    [] 2 [x] 3    [] 3   [] 4      6. Eating meals?    [] 1   [] 2   [] 2   [] 3   [] 3   [x] 4      Raw Score:  17     [24=0% impaired(CH), 23=1-19%(CI), 20-22=20-39%(CJ), 15-19=40-59%(CK), 10-14=60-79%(CL), 7-9=80-99%(CM), 6=100%(CN)]    Treatment:  Therapeutic Activity Training:   Therapeutic activity training was instructed today. Cues were given for safety, sequence, UE/LE placement, awareness, and balance. Activities performed today included bed mobility training, sup-sit, sit-stand, SPT. Self Care Training:   Cues were given for safety, sequence, UE/LE placement, visual cues, and balance. Activities performed today included dressing and toileting. Safety Measures: Gait belt used, Left in chair, Alarm in place    Assessment:  Assessment  Performance deficits / Impairments: Decreased functional mobility , Decreased balance, Decreased high-level IADLs, Decreased ADL status, Decreased strength, Decreased posture  Treatment Diagnosis: primary OA of left hip  Prognosis: Good  Decision Making: Medium Complexity  REQUIRES OT FOLLOW UP: Yes  Discharge Recommendations: [facility]ard Arriaga    Pt is a 65year old F admitted for primary OA of left hip. Pt underwent L hip TKA Anterior Approach on 10/12/2021. Pt reports that prior to admission she was IND with ADLs only requiring assistance for commode and tub transfers, not responsible for IADLs, and Eloy for functional mobility. This date, pt demo decreased strength, activity tolerance, balance, and overall functional mobility impacting ADL status. Pt is currently functioning below baseline and would benefit from skilled OT services at [facility]. Plan:  Plan  Times per week: 2+  Times per day: Daily      Goals:  1. Pt will complete all aspects of bed mobility for EOB/OOB ADLs ModA. 2. Pt will complete LB bathing with Gela and AE as needed. 3. Pt will complete all aspects of LB dressing with Gela and AE as needed. 4. Pt will complete all functional transfers to and from bed, chair, toilet, shower chair with CGA and AD. 5. Pt will ambulate HH distance to bathroom for toileting with SBA and AD. 6. Pt will complete all aspects of toileting task with CGA.   7. Pt will complete oral hygiene/grooming routine in standing at sink with CGA demo good dynamic standing balance for approx 8 minutes. 8. Pt will complete ther ex/ther act with focus on UB strengthening. Time:   Time in: 1600  Time out: 1640  Timed treatment minutes: 25  Total time: 40      Electronically signed by:       KAREL Wilder/L, [address] [place] [id number]

## 2021-10-13 VITALS
SYSTOLIC BLOOD PRESSURE: 151 MMHG | BODY MASS INDEX: 44.13 KG/M2 | WEIGHT: 239.8 LBS | HEIGHT: 62 IN | OXYGEN SATURATION: 98 % | RESPIRATION RATE: 18 BRPM | HEART RATE: 96 BPM | TEMPERATURE: 98.1 F | DIASTOLIC BLOOD PRESSURE: 86 MMHG

## 2021-10-13 LAB
GLUCOSE BLD-MCNC: 361 MG/DL (ref 70–99)
GLUCOSE BLD-MCNC: 380 MG/DL (ref 70–99)
HCT VFR BLD CALC: 34.3 % (ref 37–47)
HEMOGLOBIN: 10.9 GM/DL (ref 12.5–16)

## 2021-10-13 PROCEDURE — 2580000003 HC RX 258: Performed by: ORTHOPAEDIC SURGERY

## 2021-10-13 PROCEDURE — 97110 THERAPEUTIC EXERCISES: CPT

## 2021-10-13 PROCEDURE — 6360000002 HC RX W HCPCS: Performed by: ORTHOPAEDIC SURGERY

## 2021-10-13 PROCEDURE — 6370000000 HC RX 637 (ALT 250 FOR IP): Performed by: INTERNAL MEDICINE

## 2021-10-13 PROCEDURE — 97116 GAIT TRAINING THERAPY: CPT

## 2021-10-13 PROCEDURE — 36415 COLL VENOUS BLD VENIPUNCTURE: CPT

## 2021-10-13 PROCEDURE — 97530 THERAPEUTIC ACTIVITIES: CPT

## 2021-10-13 PROCEDURE — 94761 N-INVAS EAR/PLS OXIMETRY MLT: CPT

## 2021-10-13 PROCEDURE — 6370000000 HC RX 637 (ALT 250 FOR IP): Performed by: ORTHOPAEDIC SURGERY

## 2021-10-13 PROCEDURE — 2580000003 HC RX 258: Performed by: INTERNAL MEDICINE

## 2021-10-13 PROCEDURE — 94640 AIRWAY INHALATION TREATMENT: CPT

## 2021-10-13 PROCEDURE — 6360000002 HC RX W HCPCS: Performed by: INTERNAL MEDICINE

## 2021-10-13 PROCEDURE — 85014 HEMATOCRIT: CPT

## 2021-10-13 PROCEDURE — 85018 HEMOGLOBIN: CPT

## 2021-10-13 PROCEDURE — 82962 GLUCOSE BLOOD TEST: CPT

## 2021-10-13 RX ORDER — INSULIN GLARGINE 100 [IU]/ML
40 INJECTION, SOLUTION SUBCUTANEOUS NIGHTLY
Status: DISCONTINUED | OUTPATIENT
Start: 2021-10-13 | End: 2021-10-13 | Stop reason: HOSPADM

## 2021-10-13 RX ORDER — OXYCODONE HYDROCHLORIDE 5 MG/1
5 TABLET ORAL EVERY 6 HOURS PRN
Qty: 25 TABLET | Refills: 0 | Status: SHIPPED | OUTPATIENT
Start: 2021-10-13 | End: 2021-10-16

## 2021-10-13 RX ORDER — SODIUM CHLORIDE 9 MG/ML
INJECTION, SOLUTION INTRAVENOUS CONTINUOUS
Status: DISCONTINUED | OUTPATIENT
Start: 2021-10-13 | End: 2021-10-13 | Stop reason: HOSPADM

## 2021-10-13 RX ORDER — MAGNESIUM SULFATE IN WATER 40 MG/ML
2000 INJECTION, SOLUTION INTRAVENOUS ONCE
Status: COMPLETED | OUTPATIENT
Start: 2021-10-13 | End: 2021-10-13

## 2021-10-13 RX ADMIN — CLOPIDOGREL BISULFATE 75 MG: 75 TABLET ORAL at 07:59

## 2021-10-13 RX ADMIN — ACETAMINOPHEN 650 MG: 325 TABLET ORAL at 01:40

## 2021-10-13 RX ADMIN — ASPIRIN 81 MG: 81 TABLET, CHEWABLE ORAL at 07:59

## 2021-10-13 RX ADMIN — BUDESONIDE AND FORMOTEROL FUMARATE DIHYDRATE 2 PUFF: 160; 4.5 AEROSOL RESPIRATORY (INHALATION) at 07:55

## 2021-10-13 RX ADMIN — INSULIN HUMAN 15 UNITS: 100 INJECTION, SUSPENSION SUBCUTANEOUS at 08:06

## 2021-10-13 RX ADMIN — SODIUM CHLORIDE: 9 INJECTION, SOLUTION INTRAVENOUS at 07:58

## 2021-10-13 RX ADMIN — SODIUM CHLORIDE 25 ML: 9 INJECTION, SOLUTION INTRAVENOUS at 01:41

## 2021-10-13 RX ADMIN — CEFAZOLIN SODIUM 2000 MG: 2 INJECTION, SOLUTION INTRAVENOUS at 01:42

## 2021-10-13 RX ADMIN — Medication 1000 UNITS: at 07:59

## 2021-10-13 RX ADMIN — DOCUSATE SODIUM 50 MG AND SENNOSIDES 8.6 MG 1 TABLET: 8.6; 5 TABLET, FILM COATED ORAL at 08:00

## 2021-10-13 RX ADMIN — OXYCODONE HYDROCHLORIDE 10 MG: 10 TABLET ORAL at 07:59

## 2021-10-13 RX ADMIN — SODIUM CHLORIDE, POTASSIUM CHLORIDE, SODIUM LACTATE AND CALCIUM CHLORIDE: 600; 310; 30; 20 INJECTION, SOLUTION INTRAVENOUS at 05:31

## 2021-10-13 RX ADMIN — RANOLAZINE 500 MG: 500 TABLET, EXTENDED RELEASE ORAL at 07:59

## 2021-10-13 RX ADMIN — HYDROCHLOROTHIAZIDE 25 MG: 25 TABLET ORAL at 08:00

## 2021-10-13 RX ADMIN — MAGNESIUM SULFATE HEPTAHYDRATE 2000 MG: 40 INJECTION, SOLUTION INTRAVENOUS at 07:56

## 2021-10-13 RX ADMIN — PANTOPRAZOLE SODIUM 40 MG: 40 TABLET, DELAYED RELEASE ORAL at 05:30

## 2021-10-13 RX ADMIN — METFORMIN HYDROCHLORIDE 500 MG: 500 TABLET ORAL at 07:59

## 2021-10-13 RX ADMIN — ACETAMINOPHEN 650 MG: 325 TABLET ORAL at 08:00

## 2021-10-13 RX ADMIN — CARVEDILOL 6.25 MG: 6.25 TABLET, FILM COATED ORAL at 08:00

## 2021-10-13 ASSESSMENT — PAIN DESCRIPTION - ONSET
ONSET: ON-GOING
ONSET: ON-GOING

## 2021-10-13 ASSESSMENT — PAIN DESCRIPTION - DESCRIPTORS
DESCRIPTORS: ACHING
DESCRIPTORS: ACHING;THROBBING

## 2021-10-13 ASSESSMENT — PAIN DESCRIPTION - PAIN TYPE
TYPE: SURGICAL PAIN
TYPE: SURGICAL PAIN

## 2021-10-13 ASSESSMENT — PAIN DESCRIPTION - LOCATION
LOCATION: HIP
LOCATION: HIP

## 2021-10-13 ASSESSMENT — PAIN DESCRIPTION - FREQUENCY
FREQUENCY: INTERMITTENT
FREQUENCY: CONTINUOUS

## 2021-10-13 ASSESSMENT — PAIN DESCRIPTION - ORIENTATION
ORIENTATION: LEFT
ORIENTATION: LEFT

## 2021-10-13 ASSESSMENT — PAIN DESCRIPTION - PROGRESSION
CLINICAL_PROGRESSION: NOT CHANGED
CLINICAL_PROGRESSION: NOT CHANGED

## 2021-10-13 ASSESSMENT — PAIN SCALES - GENERAL
PAINLEVEL_OUTOF10: 3
PAINLEVEL_OUTOF10: 8

## 2021-10-13 ASSESSMENT — PAIN - FUNCTIONAL ASSESSMENT: PAIN_FUNCTIONAL_ASSESSMENT: PREVENTS OR INTERFERES SOME ACTIVE ACTIVITIES AND ADLS

## 2021-10-13 NOTE — CONSULTS
Endocrinology   Consult Note      Dear Doctor Liliam Boo for the Consult     Pt. Was Admitted for : Left hip arthroplasty      Reason for Consult: Better control of blood glucose      History Obtained From:  Patient/ EMR       HISTORY OF PRESENT ILLNESS:                The patient is a 65 y.o. female with significant past medical history of GERD, CAD, PTCA, chronic back pain DDD with severe left hip pain, diabetes mellitus, hypertension, hyperlipidemia, high port thyroidism possibly had hyperthyroidism earlier was admitted to hospital with severe left hip pain and underwent left hip arthroplasty yesterday on October 12, 2021. Has been running very high blood glucose. I was  consulted for better control of blood glucose. ROS:   Pt's ROS done in detail. Abnormal ROS are noted in Medical and Surgical History Section below: Other Medical History:        Diagnosis Date    Acid reflux     Angina at rest [facility]) In Past    Denies Chest Pain At This Time    Arthritis     \"Knees, Back And Hips\"    Asthma     Broken teeth     Upper    CAD (coronary artery disease)     Sees Dr. Gilbert Barajas a couple of heart stents- had stress test in May 2018 all ok\"    Cancer [facility])     skin cancer on face    Chronic back pain     Degenerative joint disease     Back     Diabetes mellitus ([facility] [address] Dx 2003    GI bleed 11/2009    H/O Doppler venous ultrasound 02/11/2019    No significant insufficiency noted.  H/O echocardiogram 07/14/2014    EF 50%, low normal systolic function, no wall motion abnormalities, mild concentric hypertrophy and signs of diastolic dysfunction, mildly dilated left atrium,  no pericardial effusion, mild mitral and tricuspid regurg.  H/O echocardiogram 11/2219    EF 55-60%,  There is physiological to trace amount of pericardial fluid present.  H/O percutaneous transluminal coronary angioplasty 04/30/2019    LAD stent and PIC of restent stenosis also in LAD.     Hiatal hernia     History of complete ECG     07/20/2012=NSR, leftward axis, prob normal for age, poor r-waveprogression, inferior ST-T abnormalities, consider ischemia    History of echocardiogram 01/26/2017    Ejection fraction is visually estimated at 55%. Mild concentric left ventricular hypertrophy. Mildly dilated left atrium. Mild mitral regurgitation is present.  History of nuclear stress test 01/26/2017    cardiolite-moderate ischemia mid anteroseptal,EF70%    History of nuclear stress test 07/12/2018    Normal    Hx of 24 hour EKG monitoring 12/21/2011    brief run of SVT, otherwise noth clinically sig. arrhythmia noted    Hx of cardiovascular stress test 7/25/14 1/23/12 7/14 EF70% normal study 1/23/12=thallium stress=no scintigraphic inducible myocardial ischemia, EF 70%;  03/2011=EF 70%; 03/2010=EF 70%; 12/1997; 02/1997    Hx of cardiovascular stress test 05/04/2020    Normal stress test    Hx of CT scan     05/08/2011=CT chest with contrast=examination is moderately degraded by respiratory motion. No evidence for acute pulmonary thromboembolic disease.  Dilated main pulmonary artery suggesting elevated pulm artery pressures    Hx of Doppler ultrasound     bilateral carotid 03/11/2011= no significant stenosis present    Hx of echocardiogram     03/11/2011=mildly hypertreophied left ventricle, EF 60%, mild pulm. htn, mild aortic stenosis; 02/1997    HX OTHER MEDICAL     Primary Care Physician Is Dr. Leigh Choi Hyperlipidemia     Hypertension     Hyperthyroidism     Hypothyroidism, adult     Kidney stones Last Episode In 2000's    \"Passed Kidney Stones Three Different Times\"    Left hip pain     \"for months- had hip pain\"for steroid injection 7/5/2016    Migraines     Normal cardiac stress test 05/12/2016    EF70% Normal    ORLIN (obstructive sleep apnea)     doesnt use cpap, uses o2 2l as needed at night    Post PTCA     2008, 2010, 2016    RECEIVED BLOOD TRANSFUSION 11/2009    RECEIVED BLOOD TRANSFUSION, NO REACTION TO THE BLOOD TRANSFUSION RECEIVED    Shortness of breath on exertion     Sleep apnea     NO CPAP- last sleep study done     Teeth missing     Upper And Lower    Ulcer 2009    Stomach, GI Bleeding, Received Blood Transfusions    Urinary incontinence     \"wear a pad all the time\"    UTI (urinary tract infection) In Past     No Current Symptoms    Venous US Dup     Wears glasses     Wears partial dentures     Upper     Surgical History:        Procedure Laterality Date    CARPAL TUNNEL RELEASE Bilateral  Right     1989 Left     SECTION  1975    Tubal Ligation Also Done In  With    [address]  Last Done In     COLONOSCOPY  3/23/15    Internal hemorrhoids    COLONOSCOPY  04/10/2019    COLONOSCOPY N/A 4/10/2019    COLONOSCOPY POLYPECTOMY SNARE/COLD BIOPSY performed by Vasiliy Gutiérrez MD at [place] [address]  2010    balloon angioplasty of distal mid LAD;     CORONARY ANGIOPLASTY  2008    LAD stent 2.75x16 taxus    CORONARY ANGIOPLASTY  2008    2. 5x8 taxus stent to the ostium of the circ.  DENTAL SURGERY      Teeth Extracted In Past    DIAGNOSTIC CARDIAC CATH LAB PROCEDURE  2010    balloon angioplasty of distal mid LAD;     DIAGNOSTIC CARDIAC CATH LAB PROCEDURE  2008    LAD stent 2.75x16 taxus    DIAGNOSTIC CARDIAC CATH LAB PROCEDURE  2008    2. 5x8 taxus stent to the ostium of the circ.     ENDOSCOPY, COLON, DIAGNOSTIC  \"Several\"    ENDOSCOPY, COLON, DIAGNOSTIC  3/23/15    small hiatal hernia, anatomy consistent with banding, gastritis    FINGER TRIGGER RELEASE  10-09 \"One On Each Hand Trigger Finger Release\"     \"Left Middle Finger Trigger Finger Release\"    HYSTERECTOMY  1982    Partial Abdominal Hysterectomy, \"Pinned The Bladder Up\"    JOINT REPLACEMENT Right     Total Right Hip    JOINT REPLACEMENT Right     Total Right Knee    JOINT REPLACEMENT Left     Total Left Knee    JOINT REPLACEMENT Left     Revision Total Left Knee    KNEE SURGERY Right ,     KNEE SURGERY Left     Left Knee Bone Graft    KNEE SURGERY Right     Right Knee Bone Graft    OTHER SURGICAL HISTORY      \"Stomach Stapling\" Pre Surgery Weight Was 230 lbs    OTHER SURGICAL HISTORY Left 04/10/2017    left hip steroid injection    PTCA  16    Stenting of the LAD.  SHOULDER ARTHROSCOPY Right 2-10    SHOULDER ARTHROSCOPY Left 09/10/14    TONSILLECTOMY AND ADENOIDECTOMY  1964    TUBAL LIGATION      Done With     WISDOM TOOTH EXTRACTION      All Four [place] Teeth Extracted In Past       Allergies:  Codeine    Family History:       Problem Relation Age of Onset    Cancer Mother         Liver Cancer    Heart Disease Mother     High Blood Pressure Mother     Asthma Mother     Cancer Father         Colon Cancer    Heart Disease Father     High Blood Pressure Father     Colon Cancer Father     Arthritis Sister     Early Death Sister 52    High Blood Pressure Sister     Other Sister         \"Breathing Problem\"    Heart Disease Son         Open Heart Surgery    Diabetes Son     High Blood Pressure Son     Mental Illness Daughter         Bipolar    Early Death Sister 61        Liver Cancer    Cancer Sister         Liver Cancer    Stomach Cancer Neg Hx      REVIEW OF SYSTEMS:  Review of System Done as noted above     PHYSICAL EXAM:      Vitals:    /64   Pulse 91   Temp 97.9 °F (36.6 °C) (Oral)   Resp 18   Ht 5' 2\" (1.575 m)   Wt 239 lb 12.8 oz (108.8 kg)   SpO2 94%   BMI 43.86 kg/m²     CONSTITUTIONAL:  awake, alert, cooperative, appears stated age   EYES:  vision intact Fundoscopic Exam not performed   ENT:Normal  NECK:  Supple, No JVD.    Thyroid Exam:Normal   LUNGS:  Has Vesicular Breath Sounds,   CARDIOVASCULAR:  Normal apical impulse, regular rate and rhythm, normal S1 and S2, no S3 or S4, and has no  murmur   ABDOMEN:  No scars, normal bowel sounds, soft, non-distended, non-tender, no masses palpated, no hepatolienomegaly  Musculoskeletal: Normal  Extremities: Normal, peripheral pulses normal, , has no edema left hip tenderness had arthroplasty  NEUROLOGIC:  Awake, alert, oriented to name, place and time. Cranial nerves II-XII are grossly intact. Motor weakness. Sensory is intact. ,  and gait is abnormal.  unstable  DATA:    CBC:   Recent Labs     10/12/21  1916   WBC 14.7*   HGB 12.2*       CMP:  Recent Labs     10/12/21  1916      K 5.3*   CL 98*   CO2 23   BUN 15   CREATININE 1.0   CALCIUM 9.1     Lipids:   Lab Results   Component Value Date    CHOL 129 10/16/2019    HDL 49 10/16/2019    TRIG 191 10/16/2019     Glucose:   Recent Labs     10/12/21  0752 10/12/21  1423   POCGLU 217* 221*     Hemoglobin A1C:   Lab Results   Component Value Date    LABA1C 7.3 06/16/2017     Free T4:   Lab Results   Component Value Date    T4FREE 1.69 06/16/2017     Free T3:   Lab Results   Component Value Date    FT3 2.7 06/16/2017     TSH High Sensitivity:   Lab Results   Component Value Date    TSHHS 0.387 06/16/2017       XR HIP 2-3 VW W PELVIS LEFT    Result Date: 10/12/2021  EXAMINATION: ONE XRAY VIEW OF THE PELVIS AND TWO XRAY VIEWS LEFT HIP 10/12/2021 12:46 pm COMPARISON: 05/13/2021 HISTORY: ORDERING SYSTEM PROVIDED HISTORY: S/P L HANANE TECHNOLOGIST PROVIDED HISTORY: Of operative side while in recovery room. Reason for exam:->S/P L HANANE Reason for Exam: S/P L HANANE FINDINGS: There is a left total hip arthroplasty with noncemented components. No acute fracture or dislocation. No acute hardware failure or loosening. Grossly normal alignment. Total hip arthropasty without acute hardware complication.        Scheduled Medicines   Medications:    magnesium sulfate  2,000 mg IntraVENous Once    insulin lispro  15 Units SubCUTAneous TID     insulin lispro  0-18 Units SubCUTAneous TID     insulin lispro  0-9 Units SubCUTAneous 2 times per day    insulin glargine  40 Units SubCUTAneous Nightly    insulin NPH  15 Units SubCUTAneous Once    metFORMIN  500 mg Oral BID WC    sodium chloride flush  10 mL IntraVENous 2 times per day    acetaminophen  650 mg Oral Q6H    sennosides-docusate sodium  1 tablet Oral BID    rivaroxaban  10 mg Oral Daily    aspirin  81 mg Oral QAM    carvedilol  6.25 mg Oral BID WC    clopidogrel  75 mg Oral Daily    hydroCHLOROthiazide  25 mg Oral Daily    levothyroxine  150 mcg Oral Nightly    pantoprazole  40 mg Oral QAM AC    ranolazine  500 mg Oral BID    budesonide-formoterol  2 puff Inhalation BID    vitamin D  1,000 Units Oral Daily      Infusions:    sodium chloride      lactated ringers 125 mL/hr at 10/13/21 0531    sodium chloride 25 mL (10/13/21 0141)         IMPRESSION    Patient Active Problem List   Diagnosis    Chest pain    Pulmonary arterial hypertension (HCC)    Disease of thyroid gland    Hyperlipidemia    Hypertension    Diabetes mellitus ([facility] [address]    CAD (coronary artery disease)    Dyspnea on exertion    Arthritis of left hip    Type 2 diabetes mellitus without complication (HCC)    S/P PTCA (percutaneous transluminal coronary angioplasty)    Arthritis    Radial styloid tenosynovitis of right hand    S/P cardiac cath    Kidney disorder    Mental disorder    Obstructive sleep apnea    Change in bowel function    Percutaneous transluminal coronary angioplasty (PTCA) within last 14 to 24 months    Unstable angina ([facility] [address]    Status post angioplasty    Restrictive lung disease secondary to obesity    Nocturnal hypoxia    Morbid obesity with BMI of 40.0-44.9, adult ([facility] [address]    History of left hip replacement         RECOMMENDATIONS:      1. Reviewed POC blood glucose . Labs and X ray results   2. Reviewed Home and Current Medicines   3. Will Start On meal/ Correction bolus Humalog/ Lantus/NPH  Insulin regime / OHGD   4.  Monitor Blood

## 2021-10-13 NOTE — PROGRESS NOTES
Physical Therapy    Facility/Department: [facility]  Initial Assessment    NAME: Samina Gilmore  :   MRN: [id number]    Date of Service: 10/12/2021    Discharge Recommendations:  IP Rehab        Assessment   Assessment: Pt is a 65 y.o. female with medical history, surgical history, co-morbidities, and personal factors including Acid reflux, Angina at rest [facility]), Arthritis, Asthma, Broken teeth, CAD (coronary artery disease), Cancer ([facility] [address] Chronic back pain, Degenerative joint disease, Diabetes mellitus ([facility] [address] GI bleed, H/O Doppler venous ultrasound, H/O echocardiogram, H/O echocardiogram, H/O percutaneous transluminal coronary angioplasty, Hiatal hernia, History of complete ECG, History of echocardiogram, History of nuclear stress test, History of nuclear stress test, Hx of 24 hour EKG monitoring, Hx of cardiovascular stress test, Hx of cardiovascular stress test, Hx of CT scan, Hx of Doppler ultrasound, Hx of echocardiogram, HX OTHER MEDICAL, Hyperlipidemia, Hypertension, Hyperthyroidism, Hypothyroidism, adult, Kidney stones, Left hip pain, Migraines, Normal cardiac stress test, ORLIN (obstructive sleep apnea), Post PTCA, RECEIVED BLOOD TRANSFUSION, Shortness of breath on exertion, Sleep apnea, Teeth missing, Ulcer, Urinary incontinence, UTI (urinary tract infection), Venous US Dup,  section (, ); Finger trigger release (10- \"One On Each Hand Trigger Finger Release\"); Diagnostic Cardiac Cath Lab Procedure (2010); Diagnostic Cardiac Cath Lab Procedure (2008); Diagnostic Cardiac Cath Lab Procedure (2008); knee surgery (Right, , ); Dental surgery; [place] tooth extraction; Cholecystectomy (); Tonsillectomy and adenoidectomy (); Shoulder arthroscopy (Right, 2-10); Tubal ligation (); other surgical history (); Coronary angioplasty (2010); Coronary angioplasty (2008); Coronary angioplasty (2008);  Carpal tunnel release (Bilateral, 2001 Right ); knee surgery (Left, 1994); knee surgery (Right, 1985); Hysterectomy (1982); Shoulder arthroscopy (Left, 09/10/14); Colonoscopy (Last Done In 2000's); Colonoscopy (3/23/15); Endoscopy, colon, diagnostic (\"Several\"); Endoscopy, colon, diagnostic (3/23/15); Percutaneous Transluminal Coronary Angio (05/13/16); other surgical history (Left, 04/10/2017); Colonoscopy (04/10/2019); Colonoscopy (N/A, 4/10/2019); joint replacement (Right, 8-11); joint replacement (Right, 9-07); joint replacement (Left, 6-03); and joint replacement (Left, 12-13) with admission for L hip DJD and s/p L HANANE. Prior to admission, pt was independent with functional mobility and ADLs. Examination of body systems reveals decreased strength, decreased balance, decreased aerobic capacity, and decreased independence with functional mobility. Prognosis: Good  Decision Making: High Complexity  Clinical Presentation: unpredictable characteristics  PT Education: Goals;PT Role;Plan of Care;Gait Training;Equipment; Functional Mobility Training;Transfer Training;General Safety; Adaptive Device Training;Weight-bearing Education  REQUIRES PT FOLLOW UP: Yes  Activity Tolerance  Activity Tolerance: Patient Tolerated treatment well         Restrictions  Restrictions/Precautions  Restrictions/Precautions: General Precautions, Weight Bearing, Fall Risk  Lower Extremity Weight Bearing Restrictions  Left Lower Extremity Weight Bearing: Weight Bearing As Tolerated  Position Activity Restriction  Hip Precautions: No hip external rotation, No ADduction, No hip extension  Other position/activity restrictions: anterolateral precautions  Vision/Hearing  Vision: Within Functional Limits  Hearing: Within functional limits     Subjective  General  Chart Reviewed: Yes  Patient assessed for rehabilitation services?: Yes  Family / Caregiver Present: Yes  Follows Commands: Within Functional Limits  Pain Screening  Patient Currently in Pain: Yes  Pain Assessment  Pain Assessment: 0-10  Pain Level: 5  Pain Type: Surgical pain  Pain Location: Hip  Pain Orientation: Left  Vital Signs  Patient Currently in Pain: Yes       Orientation  Orientation  Overall Orientation Status: Within Normal Limits  Social/Functional History  Social/Functional History  Lives With: Spouse  Type of Home: House  Home Layout: One level  Home Access: Stairs to enter with rails  Entrance Stairs - Number of Steps: 4  Bathroom Shower/Tub: Tub/Shower unit  Bathroom Toilet: Handicap height  Bathroom Equipment: Shower chair  Home Equipment: Lift chair, Rolling walker, 4 wheeled walker, Cane  ADL Assistance: Independent  Homemaking Assistance: Needs assistance  Ambulation Assistance: Independent (mod I with 4ww in community; mod I with spc in house)  Transfer Assistance: Independent  Cognition   Cognition  Overall Cognitive Status: WFL    Objective             Strength RLE  Strength RLE: WFL  Strength LLE  Comment: knee extension: at least 3+/5 observed functionally     Sensation  Overall Sensation Status: WNL  Bed mobility  Supine to Sit: Moderate assistance;2 Person assistance (with verbal and tactile cues for BUE and BLE placement throughout transfer)  Transfers  Sit to Stand: Minimal Assistance (with verbal cues to push through bed/chair/commode and avoid pulling on walker)  Stand to sit: Minimal Assistance (with verbal cues to feel chair/bed/commode against back of legs, reach back, and sit slowly)  Ambulation  Ambulation?: Yes  Ambulation 1  Surface: level tile  Device: Rolling Walker  Assistance: Minimal assistance; Moderate assistance  Quality of Gait: decreased gait speed, decreased step length bilaterally, decreased stance time on LLE, antalgic, unsteady  Distance: 2 feet + 2 feet  Comments: with verbal and tacdtile cues for BLE placement, walker placement, and sequence throughout ambulation; with verbal and tactile cues to maintain upright posture in order to avoid COM shifting outside of AMAN     Balance  Posture: Fair  Sitting - Static: Good  Sitting - Dynamic: Good  Standing - Static: Poor;+  Standing - Dynamic: Poor;+        Gait Training:  Cues were given for safety, sequence, device management, balance, posture, awareness, path. Therapeutic Activity Training:   Therapeutic activity training was instructed today. Cues were given for safety, sequence, UE/LE placement, awareness, and balance. Activities performed today included bed mobility training, sup-sit, sit-stand. Plan   Plan  Times per week: 7 BID  Current Treatment Recommendations: Strengthening, ROM, Balance Training, Functional Mobility Training, Transfer Training, ADL/Self-care Training, Gait Training, Patient/Caregiver Education & Training, IADL Training, Stair training, Equipment Evaluation, Education, & procurement, Neuromuscular Re-education, Pain Management, Home Exercise Program, Positioning, Endurance Training, Safety Education & Training  Safety Devices  Type of devices: All fall risk precautions in place, Left in chair, Call light within reach, Chair alarm in place, Nurse notified, Gait belt, Patient at risk for falls      AM-PAC Score  AM-PAC Inpatient Mobility Raw Score : 12 (10/12/21 2236)  AM-PAC Inpatient T-Scale Score : 35.33 (10/12/21 2236)  Mobility Inpatient CMS 0-100% Score: 68.66 (10/12/21 2236)  Mobility Inpatient CMS G-Code Modifier : CL (10/12/21 2236)          Goals  Long term goals  Time Frame for Long term goals :  In one week:  Long term goal 1: Pt will complete all bed mobility with CGAx1  Long term goal 2: Pt will complete sit <> stand transfers with CGAx1  Long term goal 3: Pt will ambulate 50 feet with CGAx1 with LRAD  Long term goal 4: Pt will ascend/descend 4 steps with a handrail with minAx1  Long term goal 5: Pt will independently complete 3 sets of 10 reps of BLE AROM exercises in available and allowed ROM       Time In: 1600  Time Out: 1640  Total Treatment Time: 40  Timed Code Treatment Minutes: [address] [place] Angelic Hernandez DPT  License #: [id number]

## 2021-10-13 NOTE — CARE COORDINATION
Reviewed chart and spoke with pt about discharge needs/plans. Pt lives with her , she has RW, 4 wheeled walker and cane. PTA she was independent with ADL's and transportation. Pt has a PCP and insurance that covers medications. Therapy recommending ARU, called [facility] from ARU with referral. Pt prefers home with [facility] but would like to wait for PT to work with her this AM.  If they still feel ARU needed she will go. Reviewed HC options with pt and she would [facility]. Referral to [facility] for [facility].        1120 Pt worked with therapy this am and feels will be able to go home with [facility].   Orders obtained from Dr Vessie Brittle and  left for [facility] to cancel ARU referral.  No other needs identified

## 2021-10-13 NOTE — PROGRESS NOTES
Physical Therapy    Physical Therapy Treatment Note  Name: Kike Lilly MRN: [id number] :   1950   Date:  10/13/2021   Admission Date: 10/12/2021 Room:  [address] [place] [address]   Restrictions/Precautions:  Restrictions/Precautions  Restrictions/Precautions: General Precautions, Weight Bearing, Fall Risk Lower Extremity Weight Bearing Restrictions  Left Lower Extremity Weight Bearing: Weight Bearing As Tolerated   Anterior Hip precautions  No hip extension, no hip adduction and no hip external rotation  Communication with other providers:  per nurse pt is ok to treat  Subjective:  Patient states:  She is ready for therapy  Pain:   Location, Type, Intensity (0/10 to 10/10):  4/10 L hip  Objective:    Observation:  Pt was in the bed  Treatment, including education/measures:  Pt given total hip booklet and reviewed precautions and safety, also educated on a hip kit and given a sock aide, reacher and leg   Supine Exercises: Ankle pumps x 25  Quad sets x 10  Glute sets x 10  Heel slides x 10  SAQ's x 10  Therapeutic Exercise:  Therapeutic exercises were instructed today. Cues were given for technique, safety, recruitment, and rationale. Cues were verbal and/or tactile. Transfers  Supine to sit :SBA with leg   Scooting :SBA  Sit to stand :CGA  Stand to sit :CGA  Gait:  Pt amb with RW for 65 ft with CGA to SBA  Pt needed VC's for pathway  Pt states her  and son installed a ramp last night for easier entry into her home  Safety  Patient left safely in the chair, with call light/phone in reach with alarm applied. Gait belt and mask were used for transfers and gait.   Assessment / Impression:     Patient's tolerance of treatment:  Good, much improved since yesterday   Adverse Reaction: none  Significant change in status and impact:  none  Barriers to improvement:  pain  Plan for Next Session:    Will cont to work towards pt's goals per her tolerance  Time in:  0945  Time out:  1030  Timed treatment minutes:  45  Total treatment time:  39  Previously filed items:  Social/Functional History  Lives With: Spouse  Type of Home: House  Home Layout: One level  Home Access: Stairs to enter with rails  Entrance Stairs - Number of Steps: 4  Bathroom Shower/Tub: Tub/Shower unit  Bathroom Toilet: Handicap height  Bathroom Equipment: Shower chair  Home Equipment: Lift chair, Rolling walker, 4 wheeled walker, Cane  ADL Assistance: Independent  Homemaking Assistance: Needs assistance  Ambulation Assistance: Independent (mod I with 4ww in community; mod I with spc in house)  Transfer Assistance: Independent     Long term goals  Time Frame for Long term goals : In one week:  Long term goal 1: Pt will complete all bed mobility with CGAx1  Long term goal 2: Pt will complete sit <> stand transfers with CGAx1  Long term goal 3: Pt will ambulate 50 feet with CGAx1 with LRAD  Long term goal 4: Pt will ascend/descend 4 steps with a handrail with minAx1  Long term goal 5: Pt will independently complete 3 sets of 10 reps of BLE AROM exercises in available and allowed ROM    Electronically signed by:     Ramses King  10/13/2021, 10:29 AM

## 2021-10-13 NOTE — CARE COORDINATION
Referral received for ARU. I reviewed the patients clinical information as well as PT/OT notes/recommendations and discussed with Dr. David Duran. Patient has been accepted by [facility] for ARU. She will require precert with [facility] Medicare prior to transfer.

## 2021-10-13 NOTE — PROGRESS NOTES
[id number] Liaison spoke with pt and pt is agreeable to c at discharge.  Please place inpatient consult to home health needs order in [facility] [place].

## 2021-10-13 NOTE — DISCHARGE SUMMARY
ADMIT DATE: 10/12/2021    DISCHARGE DATE: 10/13/2021    PROVIDER:     Jonathon Milton DO                      ADMISSION DIAGNOSIS:  Left hip DJD. DISCHARGE DIAGNOSIS:  Left hip DJD. PROCEDURE:  Left total hip arthroplasty on 10/12/2021. HOSPITAL COURSE:  The patient is a 66-year-old female with a  longstanding history of left hip pain. For this, she was brought to  [facility] on 10/12/2021 where she underwent  left total hip arthroplasty. She was admitted postoperatively  for pain control, rehabilitation, and medical monitoring. Hospitalist  was consulted for medical management. Physical Therapy was consulted  for gait training. She did receive antibiotic prophylaxis and DVT  prophylaxis during her hospitalization. Throughout her hospital  course, clinically, she remained stable with no apparent medical  complications. She was progressing well with physical therapy and her  pain was well controlled with oral medications.  was  consulted for discharge planning. Given that clinically she was  stable, she was discharged to home on 10/13/2021. DISCHARGE MEDICATIONS:    1. Oxycodone 5 mg one p.o. q.6 h. p.r.n. Pain. 2.  Resume Plavix. 3.  Other medications per the [facility] DISCHARGE INSTRUCTIONS:  She is to have physical therapy for gait  training and range of motion, and can be weightbearing as tolerated on  the left leg. Her incision is to be kept clean, dry, and intact at  all times. She is to ice and elevate the left lower extremity for  pain and swelling. She is to contact my office if she develops  increased pain, swelling, numbness, tingling, redness, fevers, or  chills. She is to follow up in my office in 3 weeks at her  prescheduled appointment.            Chace Concepcion DO

## 2021-10-13 NOTE — PROGRESS NOTES
Patient ready for dc from nursing standpoint, spoke with case management and they are waiting on equipment.

## 2021-10-13 NOTE — PROGRESS NOTES
Karey Alba is a 65 y.o. female patient. No diagnosis found. Past Medical History:   Diagnosis Date    Acid reflux     Angina at rest [facility]) In Past    Denies Chest Pain At This Time    Arthritis     \"Knees, Back And Hips\"    Asthma     Broken teeth     Upper    CAD (coronary artery disease)     Sees Dr. James Calhoun a couple of heart stents- had stress test in May 2018 all ok\"    Cancer [facility])     skin cancer on face    Chronic back pain     Degenerative joint disease     Back     Diabetes mellitus ([address] Dx 2003    GI bleed 11/2009    H/O Doppler venous ultrasound 02/11/2019    No significant insufficiency noted.  H/O echocardiogram 07/14/2014    EF 50%, low normal systolic function, no wall motion abnormalities, mild concentric hypertrophy and signs of diastolic dysfunction, mildly dilated left atrium,  no pericardial effusion, mild mitral and tricuspid regurg.  H/O echocardiogram 11/2219    EF 55-60%,  There is physiological to trace amount of pericardial fluid present.  H/O percutaneous transluminal coronary angioplasty 04/30/2019    LAD stent and PIC of restent stenosis also in LAD.  Hiatal hernia     History of complete ECG     07/20/2012=NSR, leftward axis, prob normal for age, poor r-waveprogression, inferior ST-T abnormalities, consider ischemia    History of echocardiogram 01/26/2017    Ejection fraction is visually estimated at 55%. Mild concentric left ventricular hypertrophy. Mildly dilated left atrium. Mild mitral regurgitation is present.  History of nuclear stress test 01/26/2017    cardiolite-moderate ischemia mid anteroseptal,EF70%    History of nuclear stress test 07/12/2018    Normal    Hx of 24 hour EKG monitoring 12/21/2011    brief run of SVT, otherwise noth clinically sig.  arrhythmia noted    Hx of cardiovascular stress test 7/25/14 1/23/12 7/14 EF70% normal study 1/23/12=thallium stress=no scintigraphic inducible myocardial ischemia, EF 70%; 03/2011=EF 70%; 03/2010=EF 70%; 12/1997; 02/1997    Hx of cardiovascular stress test 05/04/2020    Normal stress test    Hx of CT scan     05/08/2011=CT chest with contrast=examination is moderately degraded by respiratory motion. No evidence for acute pulmonary thromboembolic disease. Dilated main pulmonary artery suggesting elevated pulm artery pressures    Hx of Doppler ultrasound     bilateral carotid 03/11/2011= no significant stenosis present    Hx of echocardiogram     03/11/2011=mildly hypertreophied left ventricle, EF 60%, mild pulm. htn, mild aortic stenosis; 02/1997   [facility] OTHER MEDICAL     Primary Care Physician Is Dr. Nata Lentz Hyperlipidemia     Hypertension     Hyperthyroidism     Hypothyroidism, adult     Kidney stones Last Episode In 2000's    \"Passed Kidney Stones Three Different Times\"    Left hip pain     \"for months- had hip pain\"for steroid injection 7/5/2016    Migraines     Normal cardiac stress test 05/12/2016    EF70% Normal    ORLIN (obstructive sleep apnea)     doesnt use cpap, uses o2 2l as needed at night    Post PTCA     2008, 2010, 2016    RECEIVED BLOOD TRANSFUSION 11/2009    RECEIVED BLOOD TRANSFUSION, NO REACTION TO THE BLOOD TRANSFUSION RECEIVED    Shortness of breath on exertion     Sleep apnea     NO CPAP- last sleep study done 2011    Teeth missing     Upper And Lower    Ulcer 11/2009    Stomach, GI Bleeding, Received Blood Transfusions    Urinary incontinence     \"wear a pad all the time\"    UTI (urinary tract infection) In Past     No Current Symptoms    Venous US Dup     Wears glasses     Wears partial dentures     Upper     No past surgical history pertinent negatives on file.   Scheduled Meds:   magnesium sulfate  2,000 mg IntraVENous Once    insulin lispro  15 Units SubCUTAneous TID WC    insulin lispro  0-18 Units SubCUTAneous TID WC    insulin lispro  0-9 Units SubCUTAneous 2 times per day    insulin glargine  40 Units SubCUTAneous Nightly  insulin NPH  15 Units SubCUTAneous Once    metFORMIN  500 mg Oral BID WC    sodium chloride flush  10 mL IntraVENous 2 times per day    acetaminophen  650 mg Oral Q6H    sennosides-docusate sodium  1 tablet Oral BID    rivaroxaban  10 mg Oral Daily    aspirin  81 mg Oral QAM    carvedilol  6.25 mg Oral BID WC    clopidogrel  75 mg Oral Daily    hydroCHLOROthiazide  25 mg Oral Daily    levothyroxine  150 mcg Oral Nightly    pantoprazole  40 mg Oral QAM AC    ranolazine  500 mg Oral BID    budesonide-formoterol  2 puff Inhalation BID    vitamin D  1,000 Units Oral Daily     Continuous Infusions:   sodium chloride      lactated ringers 125 mL/hr at 10/13/21 0531    sodium chloride 25 mL (10/13/21 0141)     PRN Meds:sodium chloride flush, sodium chloride, oxyCODONE **OR** oxyCODONE, promethazine **OR** ondansetron, magnesium hydroxide, ketorolac, albuterol sulfate HFA    Allergies   Allergen Reactions    Codeine Nausea Only     Active Problems:    History of left hip replacement  Resolved Problems:    * No resolved hospital problems. *    Blood pressure 111/64, pulse 91, temperature 97.9 °F (36.6 °C), temperature source Oral, resp. rate 18, height 5' 2\" (1.575 m), weight 239 lb 12.8 oz (108.8 kg), SpO2 94 %, not currently breastfeeding. Subjective   Patient seen and examined, resting in bed comfortably, pain controlled, no new complaints. Did well overnight with PT, ready to go home. Objective:  Vital signs (most recent): Blood pressure 111/64, pulse 91, temperature 97.9 °F (36.6 °C), temperature source Oral, resp. rate 18, height 5' 2\" (1.575 m), weight 239 lb 12.8 oz (108.8 kg), SpO2 94 %, not currently breastfeeding. LLE - Dressing removed, incision clean, dry, intact, no calf TTP, compartments soft,   Mild pain with range of motion of left hip. Assessment & Plan  POD #1 L HANANE, Doing well postoperatively    Continue weight bearing as tolerated.   Continue range of motion exercises  Pain control  DVT prophylaxis  Continue PT/OT  Discharge home today.     Ale Benedict,   10/13/2021

## 2021-10-29 ENCOUNTER — APPOINTMENT (OUTPATIENT)
Dept: GENERAL RADIOLOGY | Age: 71
End: 2021-10-29
Payer: MEDICARE

## 2021-10-29 ENCOUNTER — HOSPITAL ENCOUNTER (EMERGENCY)
Age: 71
Discharge: HOME | End: 2021-10-29
Attending: EMERGENCY MEDICINE
Payer: MEDICARE

## 2021-10-29 ENCOUNTER — TELEPHONE (OUTPATIENT)
Dept: ORTHOPEDIC SURGERY | Age: 71
End: 2021-10-29

## 2021-10-29 VITALS
DIASTOLIC BLOOD PRESSURE: 76 MMHG | BODY MASS INDEX: 41.59 KG/M2 | RESPIRATION RATE: 18 BRPM | HEIGHT: 62 IN | HEART RATE: 99 BPM | SYSTOLIC BLOOD PRESSURE: 129 MMHG | TEMPERATURE: 98.1 F | WEIGHT: 226 LBS | OXYGEN SATURATION: 97 %

## 2021-10-29 DIAGNOSIS — T81.89XA DRAINING POSTOPERATIVE WOUND, INITIAL ENCOUNTER: Primary | ICD-10-CM

## 2021-10-29 LAB
ALBUMIN SERPL-MCNC: 3.3 GM/DL (ref 3.4–5)
ALP BLD-CCNC: 142 IU/L (ref 40–129)
ALT SERPL-CCNC: 10 U/L (ref 10–40)
ANION GAP SERPL CALCULATED.3IONS-SCNC: 13 MMOL/L (ref 4–16)
AST SERPL-CCNC: 12 IU/L (ref 15–37)
BASOPHILS ABSOLUTE: 0.1 K/CU MM
BASOPHILS RELATIVE PERCENT: 0.6 % (ref 0–1)
BILIRUB SERPL-MCNC: 1 MG/DL (ref 0–1)
BUN BLDV-MCNC: 11 MG/DL (ref 6–23)
CALCIUM SERPL-MCNC: 9.5 MG/DL (ref 8.3–10.6)
CHLORIDE BLD-SCNC: 97 MMOL/L (ref 99–110)
CO2: 28 MMOL/L (ref 21–32)
CREAT SERPL-MCNC: 0.6 MG/DL (ref 0.6–1.1)
DIFFERENTIAL TYPE: ABNORMAL
EOSINOPHILS ABSOLUTE: 0.2 K/CU MM
EOSINOPHILS RELATIVE PERCENT: 1.8 % (ref 0–3)
ERYTHROCYTE SEDIMENTATION RATE: 71 MM/HR (ref 0–30)
GFR AFRICAN AMERICAN: >60 ML/MIN/1.73M2
GFR NON-AFRICAN AMERICAN: >60 ML/MIN/1.73M2
GLUCOSE BLD-MCNC: 177 MG/DL (ref 70–99)
HCT VFR BLD CALC: 34.5 % (ref 37–47)
HEMOGLOBIN: 10.3 GM/DL (ref 12.5–16)
HIGH SENSITIVE C-REACTIVE PROTEIN: 259.5 MG/L
IMMATURE NEUTROPHIL %: 0.2 % (ref 0–0.43)
LYMPHOCYTES ABSOLUTE: 2 K/CU MM
LYMPHOCYTES RELATIVE PERCENT: 23.9 % (ref 24–44)
MCH RBC QN AUTO: 27.7 PG (ref 27–31)
MCHC RBC AUTO-ENTMCNC: 29.9 % (ref 32–36)
MCV RBC AUTO: 92.7 FL (ref 78–100)
MONOCYTES ABSOLUTE: 1.1 K/CU MM
MONOCYTES RELATIVE PERCENT: 13.6 % (ref 0–4)
PDW BLD-RTO: 15.5 % (ref 11.7–14.9)
PLATELET # BLD: 435 K/CU MM (ref 140–440)
PMV BLD AUTO: 9.9 FL (ref 7.5–11.1)
POTASSIUM SERPL-SCNC: 3.9 MMOL/L (ref 3.5–5.1)
RBC # BLD: 3.72 M/CU MM (ref 4.2–5.4)
SEGMENTED NEUTROPHILS ABSOLUTE COUNT: 4.9 K/CU MM
SEGMENTED NEUTROPHILS RELATIVE PERCENT: 59.9 % (ref 36–66)
SODIUM BLD-SCNC: 138 MMOL/L (ref 135–145)
TOTAL IMMATURE NEUTOROPHIL: 0.02 K/CU MM
TOTAL PROTEIN: 6.2 GM/DL (ref 6.4–8.2)
WBC # BLD: 8.2 K/CU MM (ref 4–10.5)

## 2021-10-29 PROCEDURE — 85025 COMPLETE CBC W/AUTO DIFF WBC: CPT

## 2021-10-29 PROCEDURE — 80053 COMPREHEN METABOLIC PANEL: CPT

## 2021-10-29 PROCEDURE — 85652 RBC SED RATE AUTOMATED: CPT

## 2021-10-29 PROCEDURE — 96375 TX/PRO/DX INJ NEW DRUG ADDON: CPT

## 2021-10-29 PROCEDURE — 87075 CULTR BACTERIA EXCEPT BLOOD: CPT

## 2021-10-29 PROCEDURE — 86141 C-REACTIVE PROTEIN HS: CPT

## 2021-10-29 PROCEDURE — 87040 BLOOD CULTURE FOR BACTERIA: CPT

## 2021-10-29 PROCEDURE — 6360000002 HC RX W HCPCS: Performed by: EMERGENCY MEDICINE

## 2021-10-29 PROCEDURE — 96365 THER/PROPH/DIAG IV INF INIT: CPT

## 2021-10-29 PROCEDURE — 87186 SC STD MICRODIL/AGAR DIL: CPT

## 2021-10-29 PROCEDURE — 87070 CULTURE OTHR SPECIMN AEROBIC: CPT

## 2021-10-29 PROCEDURE — 73502 X-RAY EXAM HIP UNI 2-3 VIEWS: CPT

## 2021-10-29 PROCEDURE — 2580000003 HC RX 258: Performed by: EMERGENCY MEDICINE

## 2021-10-29 PROCEDURE — 99283 EMERGENCY DEPT VISIT LOW MDM: CPT

## 2021-10-29 PROCEDURE — 87077 CULTURE AEROBIC IDENTIFY: CPT

## 2021-10-29 RX ORDER — MORPHINE SULFATE 4 MG/ML
4 INJECTION, SOLUTION INTRAMUSCULAR; INTRAVENOUS ONCE
Status: COMPLETED | OUTPATIENT
Start: 2021-10-29 | End: 2021-10-29

## 2021-10-29 RX ORDER — ONDANSETRON 2 MG/ML
4 INJECTION INTRAMUSCULAR; INTRAVENOUS ONCE
Status: COMPLETED | OUTPATIENT
Start: 2021-10-29 | End: 2021-10-29

## 2021-10-29 RX ADMIN — ONDANSETRON 4 MG: 2 INJECTION INTRAMUSCULAR; INTRAVENOUS at 13:17

## 2021-10-29 RX ADMIN — VANCOMYCIN HYDROCHLORIDE 1000 MG: 1 INJECTION, POWDER, LYOPHILIZED, FOR SOLUTION INTRAVENOUS at 15:10

## 2021-10-29 RX ADMIN — MORPHINE SULFATE 4 MG: 4 INJECTION INTRAVENOUS at 13:17

## 2021-10-29 RX ADMIN — CEFTRIAXONE SODIUM 1000 MG: 1 INJECTION, POWDER, FOR SOLUTION INTRAMUSCULAR; INTRAVENOUS at 13:12

## 2021-10-29 ASSESSMENT — PAIN SCALES - GENERAL
PAINLEVEL_OUTOF10: 3
PAINLEVEL_OUTOF10: 8
PAINLEVEL_OUTOF10: 8

## 2021-10-29 ASSESSMENT — PAIN DESCRIPTION - LOCATION: LOCATION: HIP

## 2021-10-29 ASSESSMENT — PAIN DESCRIPTION - FREQUENCY: FREQUENCY: CONTINUOUS

## 2021-10-29 ASSESSMENT — PAIN DESCRIPTION - ORIENTATION: ORIENTATION: LEFT

## 2021-10-29 ASSESSMENT — PAIN DESCRIPTION - DESCRIPTORS: DESCRIPTORS: ACHING

## 2021-10-29 NOTE — ED PROVIDER NOTES
Emergency Department Encounter  [address]    Patient: Erica Causey  MRN: [id number]  : 1950  Date of Evaluation: 10/29/2021  ED Provider: Yasemin Mclean MD    Chief Complaint       Chief Complaint   Patient presents with   Yi Sosa     reports of having a left hip replacement 2 weeks ago and there is an area that is draining    Wound Check     [address] is a 65 y.o. female who presents to the emergency department with report that she has had chills and not feeling well for last 2 days. She stated she went to the bathroom 2 days ago noted drainage of the left hip incision site. She had hip replacement 2 weeks ago by Dr. Awilda Romano. She is scheduled to see Dr. Awilda Romano this coming Monday. Does not have fever. Patient reports that the pain at the left hip is 7/10 intensity. There is no new numbness, tingling, weakness. ROS:     At least 10 systems reviewed and otherwise acutely negative except as in the [address] Past History     Past Medical History:   Diagnosis Date    Acid reflux     Angina at rest [facility]) In Past    Denies Chest Pain At This Time    Arthritis     \"Knees, Back And Hips\"    Asthma     Broken teeth     Upper    CAD (coronary artery disease)     Sees Dr. Rosa Vegas a couple of heart stents- had stress test in May 2018 all ok\"    Cancer [facility])     skin cancer on face    Chronic back pain     Degenerative joint disease     Back     Diabetes mellitus ([facility] [address] Dx 2003    GI bleed 2009    H/O Doppler venous ultrasound 2019    No significant insufficiency noted.  H/O echocardiogram 2014    EF 50%, low normal systolic function, no wall motion abnormalities, mild concentric hypertrophy and signs of diastolic dysfunction, mildly dilated left atrium,  no pericardial effusion, mild mitral and tricuspid regurg.     H/O echocardiogram 2219    EF 55-60%,  There is physiological to trace amount of pericardial fluid present.  H/O percutaneous transluminal coronary angioplasty 04/30/2019    LAD stent and PIC of restent stenosis also in LAD.  Hiatal hernia     History of complete ECG     07/20/2012=NSR, leftward axis, prob normal for age, poor r-waveprogression, inferior ST-T abnormalities, consider ischemia    History of echocardiogram 01/26/2017    Ejection fraction is visually estimated at 55%. Mild concentric left ventricular hypertrophy. Mildly dilated left atrium. Mild mitral regurgitation is present.  History of nuclear stress test 01/26/2017    cardiolite-moderate ischemia mid anteroseptal,EF70%    History of nuclear stress test 07/12/2018    Normal    Hx of 24 hour EKG monitoring 12/21/2011    brief run of SVT, otherwise noth clinically sig. arrhythmia noted    Hx of cardiovascular stress test 7/25/14 1/23/12 7/14 EF70% normal study 1/23/12=thallium stress=no scintigraphic inducible myocardial ischemia, EF 70%;  03/2011=EF 70%; 03/2010=EF 70%; 12/1997; 02/1997    Hx of cardiovascular stress test 05/04/2020    Normal stress test    Hx of CT scan     05/08/2011=CT chest with contrast=examination is moderately degraded by respiratory motion. No evidence for acute pulmonary thromboembolic disease.  Dilated main pulmonary artery suggesting elevated pulm artery pressures    Hx of Doppler ultrasound     bilateral carotid 03/11/2011= no significant stenosis present    Hx of echocardiogram     03/11/2011=mildly hypertreophied left ventricle, EF 60%, mild pulm. htn, mild aortic stenosis; 02/1997   [facility] OTHER MEDICAL     Primary Care Physician Is Dr. Gladys Rodgers Hyperlipidemia     Hypertension     Hyperthyroidism     Hypothyroidism, adult     Kidney stones Last Episode In 2000's    \"Passed Kidney Stones Three Different Times\"    Left hip pain     \"for months- had hip pain\"for steroid injection 7/5/2016    Migraines     Normal cardiac stress test 05/12/2016    EF70% Normal    ORLIN (obstructive sleep apnea) doesnt use cpap, uses o2 2l as needed at night    Post PTCA     , , 2016    RECEIVED BLOOD TRANSFUSION 2009    RECEIVED BLOOD TRANSFUSION, NO REACTION TO THE BLOOD TRANSFUSION RECEIVED    Shortness of breath on exertion     Sleep apnea     NO CPAP- last sleep study done     Teeth missing     Upper And Lower    Ulcer 2009    Stomach, GI Bleeding, Received Blood Transfusions    Urinary incontinence     \"wear a pad all the time\"    UTI (urinary tract infection) In Past     No Current Symptoms    Venous US Dup     Wears glasses     Wears partial dentures     Upper     Past Surgical History:   Procedure Laterality Date    CARPAL TUNNEL RELEASE Bilateral  Right      Left     SECTION  ,     Tubal Ligation Also Done In  With     CHOLECYSTECTOMY      COLONOSCOPY  Last Done In     COLONOSCOPY  3/23/15    Internal hemorrhoids    COLONOSCOPY  04/10/2019    COLONOSCOPY N/A 4/10/2019    COLONOSCOPY POLYPECTOMY SNARE/COLD BIOPSY performed by Michelle Johnston MD at [place] [id number]  2010    balloon angioplasty of distal mid LAD;     CORONARY ANGIOPLASTY  2008    LAD stent 2.75x16 taxus    CORONARY ANGIOPLASTY  2008    2. 5x8 taxus stent to the ostium of the circ.  DENTAL SURGERY      Teeth Extracted In Past    DIAGNOSTIC CARDIAC CATH LAB PROCEDURE  2010    balloon angioplasty of distal mid LAD;     DIAGNOSTIC CARDIAC CATH LAB PROCEDURE  2008    LAD stent 2.75x16 taxus    DIAGNOSTIC CARDIAC CATH LAB PROCEDURE  2008    2. 5x8 taxus stent to the ostium of the circ.     ENDOSCOPY, COLON, DIAGNOSTIC  \"Several\"    ENDOSCOPY, COLON, DIAGNOSTIC  3/23/15    small hiatal hernia, anatomy consistent with banding, gastritis    FINGER TRIGGER RELEASE  10- \"One On Each Hand Trigger Finger Release\"    - \"Left Middle Finger Trigger Finger Release\"    HYSTERECTOMY      Partial Abdominal Hysterectomy, \"Pinned The Bladder Up\"    JOINT REPLACEMENT Right     Total Right Hip    JOINT REPLACEMENT Right     Total Right Knee    JOINT REPLACEMENT Left     Total Left Knee    JOINT REPLACEMENT Left     Revision Total Left Knee    KNEE SURGERY Right ,     KNEE SURGERY Left     Left Knee Bone Graft    KNEE SURGERY Right     Right Knee Bone Graft    OTHER SURGICAL HISTORY      \"Stomach Stapling\" Pre Surgery Weight Was 230 lbs    OTHER SURGICAL HISTORY Left 04/10/2017    left hip steroid injection    PTCA  16    Stenting of the LAD.     SHOULDER ARTHROSCOPY Right 2-10    SHOULDER ARTHROSCOPY Left 09/10/14    TONSILLECTOMY AND ADENOIDECTOMY  1964    TOTAL HIP ARTHROPLASTY Left 10/12/2021    LEFT HIP TOTAL ARTHROPLASTY POSTERIOR performed by Wallace Gamez DO at [place] [address]    Done With     WISDOM TOOTH EXTRACTION      All Four [place] Teeth Extracted In Past     Social History     Socioeconomic History    Marital status:      Spouse name: None    Number of children: None    Years of education: None    Highest education level: None   Occupational History    None   Tobacco Use    Smoking status: Former Smoker     Packs/day: 0.25     Years: 38.00     Pack years: 9.50     Types: Cigarettes     Start date: 1962     Quit date: 2000     Years since quittin.1    Smokeless tobacco: Never Used    Tobacco comment: \"never a heavy smoker just occ smoker\"   Vaping Use    Vaping Use: Never used   Substance and Sexual Activity    Alcohol use: No    Drug use: No    Sexual activity: Yes     Partners: Male   Other Topics Concern    None   Social History Narrative    None     Social Determinants of Health     Financial Resource Strain:     Difficulty of Paying Living Expenses:    Food Insecurity:     Worried About Running Out of Food in the Last Year:     Ran Out of Food in the Last Year:    Transportation Needs:     Lack of Transportation (Medical):  Lack of Transportation (Non-Medical):    Physical Activity:     Days of Exercise per Week:     Minutes of Exercise per Session:    Stress:     Feeling of Stress :    Social Connections:     Frequency of Communication with Friends and Family:     Frequency of Social Gatherings with Friends and Family:     Attends Mandaeism Services:     Active Member of Clubs or Organizations:     Attends Club or Organization Meetings:     Marital Status:    Intimate Partner Violence:     Fear of Current or Ex-Partner:     Emotionally Abused:     Physically Abused:     Sexually Abused:        Medications/Allergies     Previous Medications    ALBUTEROL SULFATE  (90 BASE) MCG/ACT INHALER    INHALE 2 PUFFS INTO THE LUNGS EVERY 6 HOURS AS NEEDED FOR WHEEZING    ASPIRIN 81 MG TABLET    Take 81 mg by mouth every morning. Last Dose Taken 9-2-14 Due To Scheduled Surgery    ATORVASTATIN (LIPITOR) 20 MG TABLET    Take 1 tablet by mouth nightly    B COMPLEX VITAMINS (VITAMIN B COMPLEX PO)    Take by mouth every morning     CARVEDILOL (COREG) 12.5 MG TABLET    Take 1 tablet by mouth 2 times daily (with meals)    CLOPIDOGREL (PLAVIX) 75 MG TABLET    Take 1 tablet by mouth daily    DOXYCYCLINE HYCLATE (VIBRAMYCIN) 100 MG CAPSULE        FAMOTIDINE (PEPCID) 20 MG TABLET        FERROUS SULFATE (IRON) 28 MG TABS    Take by mouth daily     GLIPIZIDE (GLUCOTROL) 5 MG TABLET    Take 5 mg by mouth 2 times daily (before meals). HYDROCHLOROTHIAZIDE (HYDRODIURIL) 25 MG TABLET    Take 1 tablet by mouth daily Takes 12.5 daily 1/2 of a 25 mg tablet    LANCETS (ONETOUCH DELICA PLUS [id number]) MISC    USE AS DIRECTED TWICE DAILY    LEVOTHYROXINE (SYNTHROID) 150 MCG TABLET    Take 150 mcg by mouth nightly.     MECLIZINE (ANTIVERT) 25 MG TABLET    TK 1 T PO  Q 4 H PRF  DIZZINESS    METFORMIN (GLUCOPHAGE) 500 MG TABLET    TK 2 T PO QAM AND 3 T PO QPM WITH MEALS    OMEPRAZOLE (PRILOSEC) 20 MG DELAYED RELEASE CAPSULE    Take 20 mg by mouth daily    ONDANSETRON (ZOFRAN) 4 MG TABLET    Take 1 tablet by mouth every 8 hours as needed for Nausea or Vomiting    ONETOUCH ULTRA STRIP    TEST AS DIRECTED TWICE DAILY    OXYGEN    Inhale into the lungs    PANTOPRAZOLE (PROTONIX) 40 MG TABLET    Take 40 mg by mouth daily    QUINAPRIL (ACCUPRIL) 40 MG TABLET    Take 1 tablet by mouth nightly    RANOLAZINE (RANEXA) 500 MG EXTENDED RELEASE TABLET    Take 1 tablet by mouth 2 times daily    SPACER/AERO-HOLDING CHAMBERS HEATHER    1 Device by Does not apply route daily as needed (use with albuterol rescue inhaler)    SYMBICORT 160-4.5 MCG/ACT AERO    INHALE 2 PUFFS INTO THE LUNGS TWICE DAILY    VITAMIN D (CHOLECALCIFEROL) 1000 UNIT TABS TABLET    Take 1,000 Units by mouth daily     Allergies   Allergen Reactions    Codeine Nausea Only        Physical Exam       ED Triage Vitals [10/29/21 1222]   BP Temp Temp Source Pulse Resp SpO2 Height Weight   129/76 98.1 °F (36.7 °C) Oral 99 18 97 % 5' 2\" (1.575 m) 226 lb (102.5 kg)     GENERAL APPEARANCE: Awake and alert. Cooperative. No acute distress. The patient is in no obvious discomfort although she reports 7/10 intensity of pain  HEAD: Normocephalic. Atraumatic. EYES: Sclera anicteric. ENT: Tolerates saliva. No trismus. NECK: Supple. Trachea midline. CARDIO: RRR. Radial pulse 2+. No audible murmur. LUNGS: Respirations unlabored. CTAB and symmetrical..  ABDOMEN: Soft. Non-distended. Non-tender. Rotund. EXTREMITIES: No acute deformities. Patient has minimum swelling at the left lower extremity compared to the right. The patient's incision site is intact. There is a dark bloody drainage at the inferior as well as superior margin of the wound. There is no increased warmth, crepitance, or significant erythema at the wound. There is mild appropriate tenderness on palpation. SKIN: Warm and dry. As above otherwise unremarkable. NEUROLOGICAL: No gross facial drooping. Moves all 4 extremities spontaneously. No gross motor or sensory deficits. Cranial nerves II through XII grossly intact. Speech is clear. GCS 15. PSYCHIATRIC: Normal mood.          Diagnostics   Labs:  Results for orders placed or performed during the hospital encounter of 10/29/21   CBC Auto Differential   Result Value Ref Range    WBC 8.2 4.0 - 10.5 K/CU MM    RBC 3.72 (L) 4.2 - 5.4 M/CU MM    Hemoglobin 10.3 (L) 12.5 - 16.0 GM/DL    Hematocrit 34.5 (L) 37 - 47 %    MCV 92.7 78 - 100 FL    MCH 27.7 27 - 31 PG    MCHC 29.9 (L) 32.0 - 36.0 %    RDW 15.5 (H) 11.7 - 14.9 %    Platelets 358 102 - 769 K/CU MM    MPV 9.9 7.5 - 11.1 FL    Differential Type AUTOMATED DIFFERENTIAL     Segs Relative 59.9 36 - 66 %    Lymphocytes % 23.9 (L) 24 - 44 %    Monocytes % 13.6 (H) 0 - 4 %    Eosinophils % 1.8 0 - 3 %    Basophils % 0.6 0 - 1 %    Segs Absolute 4.9 K/CU MM    Lymphocytes Absolute 2.0 K/CU MM    Monocytes Absolute 1.1 K/CU MM    Eosinophils Absolute 0.2 K/CU MM    Basophils Absolute 0.1 K/CU MM    Immature Neutrophil % 0.2 0 - 0.43 %    Total Immature Neutrophil 0.02 K/CU MM   Comprehensive Metabolic Panel w/ Reflex to MG   Result Value Ref Range    Sodium 138 135 - 145 MMOL/L    Potassium 3.9 3.5 - 5.1 MMOL/L    Chloride 97 (L) 99 - 110 mMol/L    CO2 28 21 - 32 MMOL/L    BUN 11 6 - 23 MG/DL    CREATININE 0.6 0.6 - 1.1 MG/DL    Glucose 177 (H) 70 - 99 MG/DL    Calcium 9.5 8.3 - 10.6 MG/DL    Albumin 3.3 (L) 3.4 - 5.0 GM/DL    Total Protein 6.2 (L) 6.4 - 8.2 GM/DL    Total Bilirubin 1.0 0.0 - 1.0 MG/DL    ALT 10 10 - 40 U/L    AST 12 (L) 15 - 37 IU/L    Alkaline Phosphatase 142 (H) 40 - 129 IU/L    GFR Non-African American >60 >60 mL/min/1.73m2    GFR African American >60 >60 mL/min/1.73m2    Anion Gap 13 4 - 16     Radiographs:  XR HIP LEFT (2-3 VIEWS)    Result Date: 10/29/2021  EXAMINATION: TWO XRAY VIEWS OF THE LEFT HIP 10/29/2021 1:06 pm COMPARISON: October 12, 2021 HISTORY: ORDERING SYSTEM PROVIDED HISTORY: Status post left hip replacement (10/12/2021) 2 weeks ago with drainage x2 days. Afebrile. Chills TECHNOLOGIST PROVIDED HISTORY: Reason for exam:->Status post left hip replacement (10/12/2021) 2 weeks ago with drainage x2 days. Afebrile. Chills Reason for Exam: Status post left hip replacement (10/12/2021) 2 weeks ago with drainage x2 days. Afebrile. Chills Acuity: Acute Type of Exam: Initial Additional signs and symptoms: left hip pain and drainage FINDINGS: Unchanged appearance of the hip arthroplasty hardware and surrounding bone. No evidence of osteomyelitis. No fracture or dislocation. Numerous foci of air are present in the soft tissues lateral to the hip and pelvis. Numerous foci of air in the soft tissues lateral to the hip and pelvis. This may relate to residual postsurgical air although some appears new and is concerning for gas-forming infection with the provided history. No evidence of osteomyelitis or other acute osseous or joint abnormality. Normal appearance of the hardware. Procedures/EKG:   None    ED Course and MDM   In brief, Esteban Morris is a 65 y.o. female who presented to the emergency department with bloody drainage from her postop wound. She has been afebrile though she reports chills at home. The patient denies any new numbness, tingling, weakness. She has been following her postop instructions per Dr. Bridget Melendez instructions. The patient CBC and CMP were unremarkable. The patient had blood and wound cultures collected. She had an x-ray that that demonstrated no evidence of osteomyelitis or other acute osseous or joint abnormality. There was normal-appearing hardware. The x-ray also demonstrated numerous foci of air in the soft tissue lateral to the hip and pelvis. This is expected postoperatively per Dr. Briana Mills (on-call orthopedic surgeon). He did request an ESR and C-reactive protein. This is for later comparison if needed.   The patient is to be discharged and follow-up with Dr. Gil Sinha as scheduled for this Monday. Dr. Kylah Beard recommended at this point she not be started antibiotics. The patient is to return immediately to the emergency department for any worsening or concerning symptoms. She and her  are in agreement to this plan of care. At time of disposition, she is stable and in no acute distress or obvious discomfort. Patient will have a large bulky dressing applied to the wound after it was cleaned by the nursing staff. She is to keep the wound clean and dry at home. She is offered supplies for wound care until she is able to obtain them for herself. ED Medication Orders (From admission, onward)    Start Ordered     Status Ordering Provider    10/29/21 1500 10/29/21 1457  vancomycin 1000 mg IVPB in 250 mL D5W addavial  ONCE     Question:  Antimicrobial Indications  Answer:  Bone and Joint Infection    Last MAR action: Stopped - by Kishor Six on 10/29/21 at [address] [facility], [facility]    10/29/21 1315 10/29/21 1300  morphine sulfate (PF) injection 4 mg  ONCE      Last MAR action: Given - by Kishor Six on 10/29/21 at [address] [facility]    10/29/21 1315 10/29/21 1300  ondansetron (ZOFRAN) injection 4 mg  ONCE      Last MAR action: Given - by Kishor Six on 10/29/21 at [address] [facility]    10/29/21 1300 10/29/21 1259  cefTRIAXone (ROCEPHIN) 1000 mg IVPB in 50 mL D5W minibag  ONCE     Question:  Antimicrobial Indications  Answer:  Skin and Soft Tissue Infection    Last MAR action: Stopped - by Rukhsana Martel on 10/29/21 at [address], [address] [address]          Final Impression      1.  Draining postoperative wound, initial encounter      DISPOSITION   : Discharge     (Please note that portions of this note may have been completed with a voice recognition program. Efforts were made to edit the dictations but occasionally words are mis-transcribed.)    Ricardo Dooley MD  [id number] MD Kayla  10/29/21 91 [facility]

## 2021-10-29 NOTE — TELEPHONE ENCOUNTER
Patient called back into the office today staying her PCP will not see her today. I recommend her got to the ED or urgent care to be evaluated just to be safe. Pt states she does not want to go to either place. I highly recommend that she goes to be seen. Pt states she may go today but will not sit there and wait for hours to be seen. Pt does have and apt on Monday.

## 2021-11-01 ENCOUNTER — OFFICE VISIT (OUTPATIENT)
Dept: ORTHOPEDIC SURGERY | Age: 71
End: 2021-11-01

## 2021-11-01 VITALS
WEIGHT: 226 LBS | BODY MASS INDEX: 41.59 KG/M2 | RESPIRATION RATE: 15 BRPM | HEIGHT: 62 IN | HEART RATE: 79 BPM | TEMPERATURE: 96.4 F | OXYGEN SATURATION: 96 %

## 2021-11-01 DIAGNOSIS — Z96.642 STATUS POST LEFT HIP REPLACEMENT: Primary | ICD-10-CM

## 2021-11-01 PROCEDURE — 99024 POSTOP FOLLOW-UP VISIT: CPT | Performed by: ORTHOPAEDIC SURGERY

## 2021-11-01 RX ORDER — HYDROCODONE BITARTRATE AND ACETAMINOPHEN 5; 325 MG/1; MG/1
1 TABLET ORAL EVERY 8 HOURS PRN
COMMUNITY
Start: 2021-09-23 | End: 2021-11-01

## 2021-11-01 ASSESSMENT — ENCOUNTER SYMPTOMS
BACK PAIN: 0
CHEST TIGHTNESS: 0
COLOR CHANGE: 0

## 2021-11-01 NOTE — PATIENT INSTRUCTIONS
Continue weight-bearing as tolerated. Continue range of motion exercises as instructed. Ice and elevate as needed. Tylenol or Motrin for pain.   Follow up in 3 weeks   continue working with therapy

## 2021-11-01 NOTE — PROGRESS NOTES
Subjective:      Patient ID: Adam Hayward is a 65 y.o. female. Patient presents to the office today for s/p check of the left HANANE DOS 10/12/21. Pt states pain today is a 3/10 will increase to a 5/10 when getting up from a sitting position. Pt states she has noticed drainage coming from her incision area. Pt states she has noticed the drainage for several days. Pt states she has been having chills and the sweats for the last 3 days. Pt states she went to the ED on Friday. Pt states she has been taking the pain medication. She comes in today for her 3-week postop recheck. Overall she states that she is not having much pain in the left hip or groin but she does continue to have some persistent, clear and yellow drainage. Patient denies any new injury to the involved extremity/ joint, denies numbness or tingling in the involved extremity and denies fever or chills. Review of Systems   Constitutional: Negative for activity change, chills and fever. Respiratory: Negative for chest tightness. Cardiovascular: Negative for chest pain. Musculoskeletal: Positive for gait problem and joint swelling. Negative for arthralgias, back pain and myalgias. Skin: Negative for color change, pallor, rash and wound. Neurological: Negative for weakness and numbness. Past Medical History:   Diagnosis Date    Acid reflux     Angina at rest [facility]) In Past    Denies Chest Pain At This Time    Arthritis     \"Knees, Back And Hips\"    Asthma     Broken teeth     Upper    CAD (coronary artery disease)     Sees Dr. Harvinder Rodarte a couple of heart stents- had stress test in May 2018 all ok\"    Cancer [facility])     skin cancer on face    Chronic back pain     Degenerative joint disease     Back     Diabetes mellitus ([facility] [address] Dx 2003    GI bleed 11/2009    H/O Doppler venous ultrasound 02/11/2019    No significant insufficiency noted.     H/O echocardiogram 07/14/2014    EF 50%, low normal systolic function, no wall motion abnormalities, mild concentric hypertrophy and signs of diastolic dysfunction, mildly dilated left atrium,  no pericardial effusion, mild mitral and tricuspid regurg.  H/O echocardiogram 11/2219    EF 55-60%,  There is physiological to trace amount of pericardial fluid present.  H/O percutaneous transluminal coronary angioplasty 04/30/2019    LAD stent and PIC of restent stenosis also in LAD.  Hiatal hernia     History of complete ECG     07/20/2012=NSR, leftward axis, prob normal for age, poor r-waveprogression, inferior ST-T abnormalities, consider ischemia    History of echocardiogram 01/26/2017    Ejection fraction is visually estimated at 55%. Mild concentric left ventricular hypertrophy. Mildly dilated left atrium. Mild mitral regurgitation is present.  History of nuclear stress test 01/26/2017    cardiolite-moderate ischemia mid anteroseptal,EF70%    History of nuclear stress test 07/12/2018    Normal    Hx of 24 hour EKG monitoring 12/21/2011    brief run of SVT, otherwise noth clinically sig. arrhythmia noted    Hx of cardiovascular stress test 7/25/14 1/23/12 7/14 EF70% normal study 1/23/12=thallium stress=no scintigraphic inducible myocardial ischemia, EF 70%;  03/2011=EF 70%; 03/2010=EF 70%; 12/1997; 02/1997    Hx of cardiovascular stress test 05/04/2020    Normal stress test    Hx of CT scan     05/08/2011=CT chest with contrast=examination is moderately degraded by respiratory motion. No evidence for acute pulmonary thromboembolic disease.  Dilated main pulmonary artery suggesting elevated pulm artery pressures    Hx of Doppler ultrasound     bilateral carotid 03/11/2011= no significant stenosis present    Hx of echocardiogram     03/11/2011=mildly hypertreophied left ventricle, EF 60%, mild pulm. htn, mild aortic stenosis; 02/1997   [facility] OTHER MEDICAL     Primary Care Physician Is Dr. Kimberly Delgadillo Hyperlipidemia     Hypertension     Hyperthyroidism     Hypothyroidism, adult     Kidney stones Last Episode In 2000's    \"Passed Kidney Stones Three Different Times\"    Left hip pain     \"for months- had hip pain\"for steroid injection 7/5/2016    Migraines     Normal cardiac stress test 05/12/2016    EF70% Normal    ORLIN (obstructive sleep apnea)     doesnt use cpap, uses o2 2l as needed at night    Post PTCA     2008, 2010, 2016    RECEIVED BLOOD TRANSFUSION 11/2009    RECEIVED BLOOD TRANSFUSION, NO REACTION TO THE BLOOD TRANSFUSION RECEIVED    Shortness of breath on exertion     Sleep apnea     NO CPAP- last sleep study done 2011    Teeth missing     Upper And Lower    Ulcer 11/2009    Stomach, GI Bleeding, Received Blood Transfusions    Urinary incontinence     \"wear a pad all the time\"    UTI (urinary tract infection) In Past     No Current Symptoms    Venous US Dup     Wears glasses     Wears partial dentures     Upper       Objective:   Physical Exam  Constitutional:       Appearance: She is well-developed. HENT:      Head: Normocephalic. Eyes:      Pupils: Pupils are equal, round, and reactive to light. Pulmonary:      Effort: Pulmonary effort is normal.   Musculoskeletal:         General: Swelling present. No tenderness or deformity. Normal range of motion. Cervical back: Normal range of motion. Right hip: No deformity, lacerations, tenderness, bony tenderness or crepitus. Normal range of motion. Normal strength. Left hip: No deformity, lacerations, tenderness, bony tenderness or crepitus. Normal range of motion. Normal strength. Right knee: Normal.      Left knee: Normal.   Skin:     General: Skin is warm and dry. Capillary Refill: Capillary refill takes less than 2 seconds. Coloration: Skin is not pale. Findings: No erythema or rash. Neurological:      Mental Status: She is alert and oriented to person, place, and time.      Left hip-Incision clean, dry, intact, with no erythema, mild clear yellow drainage, and no signs of infection. Mild pitting edema around the incision, nontender to palpation. No groin pain with range of motion of left hip. Assessment:      Left HANANE, 3 weeks      Plan:       I discussed with her today that she is progressing very well. I discussed with the patient today antibiotic prophylaxis for procedures. I discussed with the patient today that their are symptoms are normal and should improve with time. I explained to her that her drainage will be more prolonged due to fat necrosis. Continue weight-bearing as tolerated. Continue range of motion exercises as instructed. Ice and elevate as needed. Tylenol or Motrin for pain. Follow up in 3 weeks for recheck with x-rays of the left hip.           Esteban Dunn, DO

## 2021-11-03 LAB
CULTURE: ABNORMAL
CULTURE: ABNORMAL
CULTURE: NORMAL
CULTURE: NORMAL
EKG ATRIAL RATE: 84 BPM
EKG DIAGNOSIS: NORMAL
EKG P AXIS: 45 DEGREES
EKG P-R INTERVAL: 184 MS
EKG Q-T INTERVAL: 396 MS
EKG QRS DURATION: 94 MS
EKG QTC CALCULATION (BAZETT): 467 MS
EKG R AXIS: -37 DEGREES
EKG T AXIS: -23 DEGREES
EKG VENTRICULAR RATE: 84 BPM
Lab: ABNORMAL
Lab: NORMAL
Lab: NORMAL
SPECIMEN: ABNORMAL
SPECIMEN: NORMAL
SPECIMEN: NORMAL

## 2021-11-24 ENCOUNTER — OFFICE VISIT (OUTPATIENT)
Dept: ORTHOPEDIC SURGERY | Age: 71
End: 2021-11-24

## 2021-11-24 VITALS
HEIGHT: 62 IN | HEART RATE: 72 BPM | RESPIRATION RATE: 18 BRPM | WEIGHT: 219.3 LBS | OXYGEN SATURATION: 97 % | BODY MASS INDEX: 40.35 KG/M2

## 2021-11-24 DIAGNOSIS — Z96.642 STATUS POST LEFT HIP REPLACEMENT: Primary | ICD-10-CM

## 2021-11-24 PROCEDURE — 99024 POSTOP FOLLOW-UP VISIT: CPT | Performed by: PHYSICIAN ASSISTANT

## 2021-11-24 NOTE — PROGRESS NOTES
Date of surgery:   October 12, 2021  Surgeon: Dr. Julienne Scruggs    History:  Mrs. Mei Ordaz is a 47-year-old female that is following up today on a left total hip arthroplasty done on October 12, 2021. She states that she is doing well in terms of the pain in the left hip but she continues to have drainage coming from part of the incision. The drainage that she is experiencing is clear to clear yellow. She states that there is no odor. There is no redness. Physical:  Vitals:    11/24/21 1151   Pulse: 72   Resp: 18   SpO2: 97%   Weight: 219 lb 4.8 oz (99.5 kg)   Height: 5' 2\" (1.575 m)     The incision is healed proximally but at the very distal portion of the incision there is a small 1 cm gap that appears to be superficial in nature that is draining some clear serous drainage. Patient has no pain with passive internal or external rotation of the left hip. Imaging studies:  AP pelvis and 1 view of the left hip show left total hip arthroplasty in good alignment with no evidence of loosening or periprosthetic fracture. The official read and interpretation of these x-rays will be done by the the [facility]       Impression: Status post above, doing well       Plan:   Follow-up with Dr. Julienne Scruggs in 6 weeks for routine follow-up. We did discuss today that the incision may continue to drain for several more weeks at least until the area heals up on the distal portion of the incision. Explained to her that what is likely happening is there is an area of fat necrosis that is draining but I did not feel like this was infected. She will follow-up sooner if any change in the drainage. Weightbearing as tolerated.

## 2021-11-24 NOTE — PROGRESS NOTES
Patient presents to the office today with a left HANANE, DOS: 10/12/21. Patient states she does not have a lot of pain today. Pt states she has been doing the exercises given and has been progressing. Pt presents using a walker and states she has been using it for an extended time. Pt states when she is at home she will walk around not using the walker. Pt appears to have drainage around the incision site and it has been going on for over a month. Pt describes the discharge as yellow and clear. Pt denies any new injuries.

## 2021-11-24 NOTE — PATIENT INSTRUCTIONS
Continue weight bear as tolerated  Continue range of motion and exercises as instruction  Ice and elevate as needed  Tylenol or Motrin for pain  May now get incision wet, but do not submerge the incision  Keep your incision clean and dry. Follow up in 6 weeks with Dr. Vessie Brittle.

## 2021-11-29 ENCOUNTER — TELEPHONE (OUTPATIENT)
Dept: ORTHOPEDIC SURGERY | Age: 71
End: 2021-11-29

## 2022-01-03 ENCOUNTER — OFFICE VISIT (OUTPATIENT)
Dept: ORTHOPEDIC SURGERY | Age: 72
End: 2022-01-03

## 2022-01-03 VITALS
WEIGHT: 219 LBS | OXYGEN SATURATION: 97 % | BODY MASS INDEX: 40.3 KG/M2 | HEIGHT: 62 IN | RESPIRATION RATE: 15 BRPM | HEART RATE: 78 BPM

## 2022-01-03 DIAGNOSIS — Z09 POSTOP CHECK: Primary | ICD-10-CM

## 2022-01-03 DIAGNOSIS — Z96.642 STATUS POST LEFT HIP REPLACEMENT: ICD-10-CM

## 2022-01-03 PROCEDURE — 99024 POSTOP FOLLOW-UP VISIT: CPT | Performed by: ORTHOPAEDIC SURGERY

## 2022-01-03 ASSESSMENT — ENCOUNTER SYMPTOMS
CHEST TIGHTNESS: 0
BACK PAIN: 0
COLOR CHANGE: 0

## 2022-01-03 NOTE — PROGRESS NOTES
Subjective:      Patient ID: Rios Valentine is a 66 y.o. female. Patient returns to the office today for fu of the left HANANE DOS 10/12/21 pt states she is still have drainage to the left hip. Pt states that she does have drainage daily in the left hip area. She does change her dressing daily. Pt denies any fever and chills     She comes in today for her 3-month postop recheck. She states that overall she is not having any pain in her left hip or groin at all. She states that the drainage in her left hip incision has decreased considerably but she does continue to have a small growth over the scar. Patient denies any new injury to the involved extremity/ joint, denies numbness or tingling in the involved extremity and denies fever or chills. She is not able to sleep on the left side without much difficulty. Review of Systems   Constitutional: Negative for activity change, chills and fever. Respiratory: Negative for chest tightness. Cardiovascular: Negative for chest pain. Musculoskeletal: Negative for arthralgias, back pain, gait problem, joint swelling and myalgias. Skin: Negative for color change, pallor, rash and wound. Neurological: Negative for weakness and numbness. Past Medical History:   Diagnosis Date    Acid reflux     Angina at rest [facility]) In Past    Denies Chest Pain At This Time    Arthritis     \"Knees, Back And Hips\"    Asthma     Broken teeth     Upper    CAD (coronary artery disease)     Sees Dr. Coulter Sabi a couple of heart stents- had stress test in May 2018 all ok\"    Cancer [facility])     skin cancer on face    Chronic back pain     Degenerative joint disease     Back     [facility]    GI bleed 11/2009    H/O Doppler venous ultrasound 02/11/2019    No significant insufficiency noted.     H/O echocardiogram 07/14/2014    EF 50%, low normal systolic function, no wall motion abnormalities, mild concentric hypertrophy and signs of diastolic dysfunction, mildly dilated left atrium,  no pericardial effusion, mild mitral and tricuspid regurg.  H/O echocardiogram 11/2219    EF 55-60%,  There is physiological to trace amount of pericardial fluid present.  H/O percutaneous transluminal coronary angioplasty 04/30/2019    LAD stent and PIC of restent stenosis also in LAD.  Hiatal hernia     History of complete ECG     07/20/2012=NSR, leftward axis, prob normal for age, poor r-waveprogression, inferior ST-T abnormalities, consider ischemia    History of echocardiogram 01/26/2017    Ejection fraction is visually estimated at 55%. Mild concentric left ventricular hypertrophy. Mildly dilated left atrium. Mild mitral regurgitation is present.  History of nuclear stress test 01/26/2017    cardiolite-moderate ischemia mid anteroseptal,EF70%    History of nuclear stress test 07/12/2018    Normal    Hx of 24 hour EKG monitoring 12/21/2011    brief run of SVT, otherwise noth clinically sig. arrhythmia noted    Hx of cardiovascular stress test 7/25/14 1/23/12 7/14 EF70% normal study 1/23/12=thallium stress=no scintigraphic inducible myocardial ischemia, EF 70%;  03/2011=EF 70%; 03/2010=EF 70%; 12/1997; 02/1997    Hx of cardiovascular stress test 05/04/2020    Normal stress test    Hx of CT scan     05/08/2011=CT chest with contrast=examination is moderately degraded by respiratory motion. No evidence for acute pulmonary thromboembolic disease.  Dilated main pulmonary artery suggesting elevated pulm artery pressures    Hx of Doppler ultrasound     bilateral carotid 03/11/2011= no significant stenosis present    Hx of echocardiogram     03/11/2011=mildly hypertreophied left ventricle, EF 60%, mild pulm. htn, mild aortic stenosis; 02/1997   [facility] OTHER MEDICAL     Primary Care Physician Is Dr. Flaquito Coleamn Hyperlipidemia     Hypertension     Hyperthyroidism     Hypothyroidism, adult     Kidney stones Last Episode In 2000's    \"Passed Kidney Stones Three Different Times\"    Left hip pain     \"for months- had hip pain\"for steroid injection 7/5/2016    Migraines     Normal cardiac stress test 05/12/2016    EF70% Normal    ORLIN (obstructive sleep apnea)     doesnt use cpap, uses o2 2l as needed at night    Post PTCA     2008, 2010, 2016    RECEIVED BLOOD TRANSFUSION 11/2009    RECEIVED BLOOD TRANSFUSION, NO REACTION TO THE BLOOD TRANSFUSION RECEIVED    Shortness of breath on exertion     Sleep apnea     NO CPAP- last sleep study done 2011    Teeth missing     Upper And Lower    Ulcer 11/2009    Stomach, GI Bleeding, Received Blood Transfusions    Urinary incontinence     \"wear a pad all the time\"    UTI (urinary tract infection) In Past     No Current Symptoms    Venous US Dup     Wears glasses     Wears partial dentures     Upper       Objective:   Physical Exam  Constitutional:       Appearance: She is well-developed. HENT:      Head: Normocephalic. Eyes:      Pupils: Pupils are equal, round, and reactive to light. Pulmonary:      Effort: Pulmonary effort is normal.   Musculoskeletal:         General: No swelling, tenderness or deformity. Normal range of motion. Cervical back: Normal range of motion. Right hip: No deformity, lacerations, tenderness, bony tenderness or crepitus. Normal range of motion. Normal strength. Left hip: No deformity, lacerations, tenderness, bony tenderness or crepitus. Normal range of motion. Normal strength. Right knee: Normal.      Left knee: Normal.   Skin:     General: Skin is warm and dry. Capillary Refill: Capillary refill takes less than 2 seconds. Coloration: Skin is not pale. Findings: No erythema or rash. Neurological:      Mental Status: She is alert and oriented to person, place, and time. Left hip-Incision clean, dry, intact, with no erythema.   There is a 1 cm x 2 cm pyogenic granuloma at the central portion of the incision with a very mild drainage. No groin pain with range of motion of left hip. XR HIP 1 VW W PELVIS LEFT    Result Date: 1/3/2022  XRAY X-ray 2 views of left hip and AP pelvis obtained and reviewed by me today in the office demonstrates age appropriate bone density throughout with well-positioned left total hip arthroplasty, there has been no change in position of components compared to prior x-rays, no subluxation or dislocation noted. There is also again noted a well-positioned right total hip arthroplasty with no acute process. Impression: Stable left total hip arthroplasty with no acute process. Assessment:      Left HANANE, 3 months      Plan:       I discussed with her today her x-ray findings. I explained to her that her implants are stable and remain in good alignment. I discussed with her today that she is continuing to progress very well. I discussed with the patient today that their are symptoms are normal and should improve with time. I explained to her that her scar will continue to heal but if this persists we could consider surgical excision of the pyogenic granuloma. Continue weight-bearing as tolerated. Continue range of motion exercises as instructed. Ice and elevate as needed. Tylenol or Motrin for pain. Follow up in 3 months for recheck with x-rays of the left hip.           Chace 97, DO

## 2022-01-21 ENCOUNTER — OFFICE VISIT (OUTPATIENT)
Dept: CARDIOLOGY CLINIC | Age: 72
End: 2022-01-21
Payer: MEDICARE

## 2022-01-21 VITALS
HEART RATE: 68 BPM | BODY MASS INDEX: 41.22 KG/M2 | WEIGHT: 224 LBS | HEIGHT: 62 IN | SYSTOLIC BLOOD PRESSURE: 120 MMHG | DIASTOLIC BLOOD PRESSURE: 80 MMHG

## 2022-01-21 DIAGNOSIS — R07.89 OTHER CHEST PAIN: Primary | ICD-10-CM

## 2022-01-21 DIAGNOSIS — I25.10 CORONARY ARTERY DISEASE INVOLVING NATIVE CORONARY ARTERY OF NATIVE HEART WITHOUT ANGINA PECTORIS: ICD-10-CM

## 2022-01-21 PROCEDURE — 1123F ACP DISCUSS/DSCN MKR DOCD: CPT | Performed by: INTERNAL MEDICINE

## 2022-01-21 PROCEDURE — 1090F PRES/ABSN URINE INCON ASSESS: CPT | Performed by: INTERNAL MEDICINE

## 2022-01-21 PROCEDURE — 3017F COLORECTAL CA SCREEN DOC REV: CPT | Performed by: INTERNAL MEDICINE

## 2022-01-21 PROCEDURE — G8484 FLU IMMUNIZE NO ADMIN: HCPCS | Performed by: INTERNAL MEDICINE

## 2022-01-21 PROCEDURE — G8427 DOCREV CUR MEDS BY ELIG CLIN: HCPCS | Performed by: INTERNAL MEDICINE

## 2022-01-21 PROCEDURE — 4040F PNEUMOC VAC/ADMIN/RCVD: CPT | Performed by: INTERNAL MEDICINE

## 2022-01-21 PROCEDURE — G8399 PT W/DXA RESULTS DOCUMENT: HCPCS | Performed by: INTERNAL MEDICINE

## 2022-01-21 PROCEDURE — 1036F TOBACCO NON-USER: CPT | Performed by: INTERNAL MEDICINE

## 2022-01-21 PROCEDURE — G8417 CALC BMI ABV UP PARAM F/U: HCPCS | Performed by: INTERNAL MEDICINE

## 2022-01-21 PROCEDURE — 99214 OFFICE O/P EST MOD 30 MIN: CPT | Performed by: INTERNAL MEDICINE

## 2022-01-21 NOTE — PROGRESS NOTES
Sandra Potts MD        OFFICE  FOLLOWUP NOTE    Chief complaints: patient is here for management of CAD, HTN, DYSLPIDEMIA, DM,leg swelling, sleep apnea    Subjective: occasional chest pain, no shortness of breath, no dizziness, no palpitations    HPI Dayron Hayes is a 66 y. o.year old who  has a past medical history of Acid reflux, Angina at rest [facility]), Arthritis, Asthma, Broken teeth, CAD (coronary artery disease), Cancer ([facility] [address] Chronic back pain, Degenerative joint disease, Diabetes mellitus ([facility] [address] GI bleed, H/O Doppler venous ultrasound, H/O echocardiogram, H/O echocardiogram, H/O percutaneous transluminal coronary angioplasty, Hiatal hernia, History of complete ECG, History of echocardiogram, History of nuclear stress test, History of nuclear stress test, Hx of 24 hour EKG monitoring, Hx of cardiovascular stress test, Hx of cardiovascular stress test, Hx of CT scan, Hx of Doppler ultrasound, Hx of echocardiogram, HX OTHER MEDICAL, Hyperlipidemia, Hypertension, Hyperthyroidism, Hypothyroidism, adult, Kidney stones, Left hip pain, Migraines, Normal cardiac stress test, ORLIN (obstructive sleep apnea), Post PTCA, RECEIVED BLOOD TRANSFUSION, Shortness of breath on exertion, Sleep apnea, Teeth missing, Ulcer, Urinary incontinence, UTI (urinary tract infection), Venous US Dup, Wears glasses, and Wears partial dentures.  and presents for management of CAD, HTN, DYSLPIDEMIA, DM,leg swelling, sleep apnea which are well controlled    She feels occasional chest pain, she thinks its same as it was before stent    Current Outpatient Medications   Medication Sig Dispense Refill    Lancets (ONETOUCH DELICA PLUS [id number]) MISC USE AS DIRECTED TWICE DAILY      ONETOUCH ULTRA strip TEST AS DIRECTED TWICE DAILY      famotidine (PEPCID) 20 MG tablet       SYMBICORT 160-4.5 MCG/ACT AERO INHALE 2 PUFFS INTO THE LUNGS TWICE DAILY 1 Inhaler 5    Spacer/Aero-Holding Chambers HEATHER 1 Device by Does not apply route daily as needed (use with albuterol rescue inhaler) 1 Device 0    ranolazine (RANEXA) 500 MG extended release tablet Take 1 tablet by mouth 2 times daily 60 tablet 5    pantoprazole (PROTONIX) 40 MG tablet Take 40 mg by mouth daily      metFORMIN (GLUCOPHAGE) 500 MG tablet TK 2 T PO QAM AND 3 T PO QPM WITH MEALS 60 tablet 11    clopidogrel (PLAVIX) 75 MG tablet Take 1 tablet by mouth daily 30 tablet 3    OXYGEN Inhale into the lungs      quinapril (ACCUPRIL) 40 MG tablet Take 1 tablet by mouth nightly 90 tablet 3    carvedilol (COREG) 12.5 MG tablet Take 1 tablet by mouth 2 times daily (with meals) (Patient taking differently: Take 6.25 mg by mouth 2 times daily (with meals) ) 1180 tablet 3    atorvastatin (LIPITOR) 20 MG tablet Take 1 tablet by mouth nightly 90 tablet 3    hydrochlorothiazide (HYDRODIURIL) 25 MG tablet Take 1 tablet by mouth daily Takes 12.5 daily 1/2 of a 25 mg tablet 90 tablet 3    B Complex Vitamins (VITAMIN B COMPLEX PO) Take by mouth every morning       vitamin D (CHOLECALCIFEROL) 1000 UNIT TABS tablet Take 1,000 Units by mouth daily      aspirin 81 MG tablet Take 81 mg by mouth every morning. Last Dose Taken 9-2-14 Due To Scheduled Surgery      glipiZIDE (GLUCOTROL) 5 MG tablet Take 5 mg by mouth 2 times daily (before meals).  levothyroxine (SYNTHROID) 150 MCG tablet Take 150 mcg by mouth nightly.  albuterol sulfate  (90 Base) MCG/ACT inhaler INHALE 2 PUFFS INTO THE LUNGS EVERY 6 HOURS AS NEEDED FOR WHEEZING 76.5 g 3    Ferrous Sulfate (IRON) 28 MG TABS Take by mouth daily  (Patient not taking: Reported on 1/21/2022)      meclizine (ANTIVERT) 25 MG tablet TK 1 T PO  Q 4 H PRF  DIZZINESS (Patient not taking: Reported on 1/21/2022)  3     No current facility-administered medications for this visit.      Allergies: Codeine  Past Medical History:   Diagnosis Date    Acid reflux     Angina at rest [facility]) In Past    Denies Chest Pain At This Time    Arthritis \"Knees, Back And Hips\"    Asthma     Broken teeth     Upper    CAD (coronary artery disease)     Sees Dr. Coni Boo a couple of heart stents- had stress test in May 2018 all ok\"    Cancer [facility])     skin cancer on face    Chronic back pain     Degenerative joint disease     Back     Diabetes mellitus ([facility] [address] Dx 2003    GI bleed 11/2009    H/O Doppler venous ultrasound 02/11/2019    No significant insufficiency noted.  H/O echocardiogram 07/14/2014    EF 50%, low normal systolic function, no wall motion abnormalities, mild concentric hypertrophy and signs of diastolic dysfunction, mildly dilated left atrium,  no pericardial effusion, mild mitral and tricuspid regurg.  H/O echocardiogram 11/2219    EF 55-60%,  There is physiological to trace amount of pericardial fluid present.  H/O percutaneous transluminal coronary angioplasty 04/30/2019    LAD stent and PIC of restent stenosis also in LAD.  Hiatal hernia     History of complete ECG     07/20/2012=NSR, leftward axis, prob normal for age, poor r-waveprogression, inferior ST-T abnormalities, consider ischemia    History of echocardiogram 01/26/2017    Ejection fraction is visually estimated at 55%. Mild concentric left ventricular hypertrophy. Mildly dilated left atrium. Mild mitral regurgitation is present.  History of nuclear stress test 01/26/2017    cardiolite-moderate ischemia mid anteroseptal,EF70%    History of nuclear stress test 07/12/2018    Normal    Hx of 24 hour EKG monitoring 12/21/2011    brief run of SVT, otherwise noth clinically sig.  arrhythmia noted    Hx of cardiovascular stress test 7/25/14 1/23/12 7/14 EF70% normal study 1/23/12=thallium stress=no scintigraphic inducible myocardial ischemia, EF 70%;  03/2011=EF 70%; 03/2010=EF 70%; 12/1997; 02/1997    Hx of cardiovascular stress test 05/04/2020    Normal stress test    Hx of CT scan     05/08/2011=CT chest with contrast=examination is moderately degraded by respiratory motion. No evidence for acute pulmonary thromboembolic disease.  Dilated main pulmonary artery suggesting elevated pulm artery pressures    Hx of Doppler ultrasound     bilateral carotid 2011= no significant stenosis present    Hx of echocardiogram     2011=mildly hypertreophied left ventricle, EF 60%, mild pulm. htn, mild aortic stenosis; 1997    HX OTHER MEDICAL     Primary Care Physician Is Dr. Fausto Prietotorwander Hyperlipidemia     Hypertension     Hyperthyroidism     Hypothyroidism, adult     Kidney stones Last Episode In     \"Passed Kidney Stones Three Different Times\"    Left hip pain     \"for months- had hip pain\"for steroid injection 2016    Migraines     Normal cardiac stress test 2016    EF70% Normal    ORLIN (obstructive sleep apnea)     doesnt use cpap, uses o2 2l as needed at night    Post PTCA     , ,     RECEIVED BLOOD TRANSFUSION 2009    RECEIVED BLOOD TRANSFUSION, NO REACTION TO THE BLOOD TRANSFUSION RECEIVED    Shortness of breath on exertion     Sleep apnea     NO CPAP- last sleep study done     Teeth missing     Upper And Lower    Ulcer 2009    Stomach, GI Bleeding, Received Blood Transfusions    Urinary incontinence     \"wear a pad all the time\"    UTI (urinary tract infection) In Past     No Current Symptoms    Venous US Dup     Wears glasses     Wears partial dentures     Upper     Past Surgical History:   Procedure Laterality Date    CARPAL TUNNEL RELEASE Bilateral  Right      Left     SECTION  ,     Tubal Ligation Also Done In  With    [address]  Last Done In     COLONOSCOPY  3/23/15    Internal hemorrhoids    COLONOSCOPY  04/10/2019    COLONOSCOPY N/A 4/10/2019    COLONOSCOPY POLYPECTOMY SNARE/COLD BIOPSY performed by Everette Fisher MD at [place] [address]  2010    balloon angioplasty of distal mid LAD;     CORONARY ANGIOPLASTY  2008    LAD stent 2.75x16 taxus    CORONARY ANGIOPLASTY  2008    2. 5x8 taxus stent to the ostium of the circ.  DENTAL SURGERY      Teeth Extracted In Past    DIAGNOSTIC CARDIAC CATH LAB PROCEDURE  2010    balloon angioplasty of distal mid LAD;     DIAGNOSTIC CARDIAC CATH LAB PROCEDURE  2008    LAD stent 2.75x16 taxus    DIAGNOSTIC CARDIAC CATH LAB PROCEDURE  2008    2. 5x8 taxus stent to the ostium of the circ.  ENDOSCOPY, COLON, DIAGNOSTIC  \"Several\"    ENDOSCOPY, COLON, DIAGNOSTIC  3/23/15    small hiatal hernia, anatomy consistent with banding, gastritis    FINGER TRIGGER RELEASE  10-09 \"One On Each Hand Trigger Finger Release\"     \"Left Middle Finger Trigger Finger Release\"    HYSTERECTOMY      Partial Abdominal Hysterectomy, \"Pinned The Bladder Up\"    JOINT REPLACEMENT Right -    Total Right Hip    JOINT REPLACEMENT Right     Total Right Knee    JOINT REPLACEMENT Left     Total Left Knee    JOINT REPLACEMENT Left     Revision Total Left Knee    KNEE SURGERY Right ,     KNEE SURGERY Left     Left Knee Bone Graft    KNEE SURGERY Right     Right Knee Bone Graft    OTHER SURGICAL HISTORY      \"Stomach Stapling\" Pre Surgery Weight Was 230 lbs    OTHER SURGICAL HISTORY Left 04/10/2017    left hip steroid injection    PTCA  16    Stenting of the LAD.     SHOULDER ARTHROSCOPY Right 2-10    SHOULDER ARTHROSCOPY Left 09/10/14    TONSILLECTOMY AND ADENOIDECTOMY  1964    TOTAL HIP ARTHROPLASTY Left 10/12/2021    LEFT HIP TOTAL ARTHROPLASTY POSTERIOR performed by Alka Roldan DO at [place] [address]    Done With     WISDOM TOOTH EXTRACTION      All [place] Teeth Extracted In Past     Family History   Problem Relation Age of Onset    Cancer Mother         Liver Cancer    Heart Disease Mother     High Blood Pressure Mother     Asthma Mother     Cancer Father Colon Cancer    Heart Disease Father     High Blood Pressure Father     Colon Cancer Father     Arthritis Sister     Early Death Sister 52    High Blood Pressure Sister     Other Sister         \"Breathing Problem\"    Heart Disease Son         Open Heart Surgery    Diabetes Son     High Blood Pressure Son     Mental Illness Daughter         Bipolar    Early Death Sister 61        Liver Cancer    Cancer Sister         Liver Cancer    Stomach Cancer Neg Hx      Social History     Tobacco Use    Smoking status: Former Smoker     Packs/day: 0.25     Years: 38.00     Pack years: 9.50     Types: Cigarettes     Start date: 1962     Quit date: 2000     Years since quittin.3    Smokeless tobacco: Never Used    Tobacco comment: \"never a heavy smoker just occ smoker\"   Substance Use Topics    Alcohol use: No      [unfilled]  Review of Systems:   · Constitutional: No Fever or Weight Loss   · Eyes: No Decreased Vision  · ENT: No Headaches, Hearing Loss or Vertigo  · Cardiovascular: occasional chest pain, dyspnea on exertion, palpitations or loss of consciousness  · Respiratory: No cough or wheezing    · Gastrointestinal: No abdominal pain, appetite loss, blood in stools, constipation, diarrhea or heartburn  · Genitourinary: No dysuria, trouble voiding, or hematuria  · Musculoskeletal:  No gait disturbance, weakness or joint complaints  · Integumentary: No rash or pruritis  · Neurological: No TIA or stroke symptoms  · Psychiatric: No anxiety or depression  · Endocrine: No malaise, fatigue or temperature intolerance  · Hematologic/Lymphatic: No bleeding problems, blood clots or swollen lymph nodes  · Allergic/Immunologic: No nasal congestion or hives  All systems negative except as marked.    Objective:  /80   Pulse 68   Ht 5' 2\" (1.575 m)   Wt 224 lb (101.6 kg)   BMI 40.97 kg/m²   Wt Readings from Last 3 Encounters:   22 224 lb (101.6 kg)   22 219 lb (99.3 kg)   21 219 lb 4.8 oz (99.5 kg)     Body mass index is 40.97 kg/m². GENERAL - Alert, oriented, pleasant, in no apparent distress,normal grooming  HEENT  pupils are intact, cornea intact, conjunctive normal color, ears are normal in exam,  Neck - Supple. No jugular venous distention noted. No carotid bruits, no apical -carotid delay  Respiratory:  Normal breath sounds, No respiratory distress, No wheezing, No chest tenderness. ,no use of accessory muscles, diaphragm movement is normal  Cardiovascular: (PMI) apex non displaced,no lifts no thrills, no s3,no s4, Normal heart rate, Normal rhythm, No murmurs, No rubs, No gallops. Carotid arteries pulse and amplitude are normal no bruit, no abdominal bruit noted ( normal abdominal aorta ausculation),   Extremities - No cyanosis, clubbing, or significant edema, no varicose veins    Abdomen  No masses, tenderness, or organomegaly, no hepato-splenomegally, no bruits  Musculoskeletal  No significant edema, no kyphosis or scoliosis, no deformity in any extremity noted, muscle strength and tone are normal  Skin: no ulcer,no scar,no stasis dermatitis   Neurologic  alert oriented times 3,Cranial nerves II through XII are grossly intact. There were no gross focal neurologic abnormalities. Psychiatric: normal mood and affect    Lab Results   Component Value Date    CKTOTAL 164 07/05/2014    CKMB 1.4 12/20/2011    TROPONINI <0.006 01/23/2014    TROPONINI <0.006 12/21/2011     BNP:    Lab Results   Component Value Date    BNP 33 01/23/2014     PT/INR:  No results found for: PTINR  Lab Results   Component Value Date    LABA1C 7.3 (H) 06/16/2017    LABA1C 7.4 (H) 03/08/2017     Lab Results   Component Value Date    CHOL 129 10/16/2019    TRIG 191 10/16/2019    HDL 49 10/16/2019    LDLCALC 42 10/16/2019    LDLDIRECT 53 06/16/2017     Lab Results   Component Value Date    ALT 10 10/29/2021    AST 12 (L) 10/29/2021     TSH:  No results found for: TSH    Impression:  Stephanie Leon is a 66 y. o.year old who  has a past medical history of Acid reflux, Angina at rest [facility]), Arthritis, Asthma, Broken teeth, CAD (coronary artery disease), Cancer ([facility] [address] Chronic back pain, Degenerative joint disease, Diabetes mellitus ([facility] [address] GI bleed, H/O Doppler venous ultrasound, H/O echocardiogram, H/O echocardiogram, H/O percutaneous transluminal coronary angioplasty, Hiatal hernia, History of complete ECG, History of echocardiogram, History of nuclear stress test, History of nuclear stress test, Hx of 24 hour EKG monitoring, Hx of cardiovascular stress test, Hx of cardiovascular stress test, Hx of CT scan, Hx of Doppler ultrasound, Hx of echocardiogram, HX OTHER MEDICAL, Hyperlipidemia, Hypertension, Hyperthyroidism, Hypothyroidism, adult, Kidney stones, Left hip pain, Migraines, Normal cardiac stress test, ORLIN (obstructive sleep apnea), Post PTCA, RECEIVED BLOOD TRANSFUSION, Shortness of breath on exertion, Sleep apnea, Teeth missing, Ulcer, Urinary incontinence, UTI (urinary tract infection), Venous US Dup, Wears glasses, and Wears partial dentures. and presents with     Plan:  1. Chest pain: stress test was normal in May 13605, she had  PCI of LAD and PTCA of instent stent restenosis in 4/19, Continue aspirin, BB, ace inhibitor and statis, ranexa,echo is normal, she uses oxygen at home as she has possible COPD, PFT is done also  2. She had a rough time in [facility], her son was in [place] and her daughter had covid 23  3. Leg swelling:venous doppler is normal, recommend compression socks  4. DM: stable continue metformin    5. Dyslipidemia: continue statins  6. HTN: stable, continue coreg and lisinopril  All labs, medications and tests reviewed, continue all other medications of all above medical condition listed as is.

## 2022-04-04 ENCOUNTER — TELEPHONE (OUTPATIENT)
Dept: ORTHOPEDIC SURGERY | Age: 72
End: 2022-04-04

## 2022-04-04 ENCOUNTER — OFFICE VISIT (OUTPATIENT)
Dept: ORTHOPEDIC SURGERY | Age: 72
End: 2022-04-04
Payer: MEDICARE

## 2022-04-04 VITALS
HEIGHT: 62 IN | OXYGEN SATURATION: 98 % | BODY MASS INDEX: 40.48 KG/M2 | RESPIRATION RATE: 15 BRPM | HEART RATE: 73 BPM | WEIGHT: 220 LBS

## 2022-04-04 DIAGNOSIS — Z96.642 STATUS POST LEFT HIP REPLACEMENT: Primary | ICD-10-CM

## 2022-04-04 PROCEDURE — 3017F COLORECTAL CA SCREEN DOC REV: CPT | Performed by: ORTHOPAEDIC SURGERY

## 2022-04-04 PROCEDURE — 99212 OFFICE O/P EST SF 10 MIN: CPT | Performed by: ORTHOPAEDIC SURGERY

## 2022-04-04 PROCEDURE — G8427 DOCREV CUR MEDS BY ELIG CLIN: HCPCS | Performed by: ORTHOPAEDIC SURGERY

## 2022-04-04 PROCEDURE — 4040F PNEUMOC VAC/ADMIN/RCVD: CPT | Performed by: ORTHOPAEDIC SURGERY

## 2022-04-04 PROCEDURE — 1090F PRES/ABSN URINE INCON ASSESS: CPT | Performed by: ORTHOPAEDIC SURGERY

## 2022-04-04 PROCEDURE — 1036F TOBACCO NON-USER: CPT | Performed by: ORTHOPAEDIC SURGERY

## 2022-04-04 PROCEDURE — G8417 CALC BMI ABV UP PARAM F/U: HCPCS | Performed by: ORTHOPAEDIC SURGERY

## 2022-04-04 PROCEDURE — 1123F ACP DISCUSS/DSCN MKR DOCD: CPT | Performed by: ORTHOPAEDIC SURGERY

## 2022-04-04 PROCEDURE — G8399 PT W/DXA RESULTS DOCUMENT: HCPCS | Performed by: ORTHOPAEDIC SURGERY

## 2022-04-04 RX ORDER — DOXYCYCLINE HYCLATE 100 MG/1
CAPSULE ORAL
COMMUNITY
Start: 2022-02-09 | End: 2022-04-07

## 2022-04-04 ASSESSMENT — ENCOUNTER SYMPTOMS
BACK PAIN: 0
CHEST TIGHTNESS: 0
COLOR CHANGE: 0

## 2022-04-04 NOTE — PROGRESS NOTES
Subjective:      Patient ID: Nicole Horner is a 66 y.o. female. Patient returns to the office with a left HANANE, DOS: 10/12/21. Patient stated overall her hip is not giving her too much pain and does not have complications when walking. However, she stated her drainage stopped and has resumed in the last couple of weeks. Pt stated previously she had clear drainage but it has thicenked up and stated it has been green and yellow. Pt stated at times she will have cold chills but not a noticeable fever. Pt denies any falls an is not taking any antibiotics. She comes in today for her 6-month postop recheck. She states that overall she is not having any pain in her left hip or groin at all. She states that the incision completely healed over and was not draining for about 2 months but then over the last couple weeks she has begun having drainage from the same portion of her incision again. She continues to describe this as a dark, yellow drainage. .    Patient denies any new injury to the involved extremity/ joint, denies numbness or tingling in the involved extremity and denies fever or chills. She is not able to sleep on the left side without much difficulty. Hip Pain   Pertinent negatives include no numbness. Review of Systems   Constitutional: Negative for activity change, chills and fever. Respiratory: Negative for chest tightness. Cardiovascular: Negative for chest pain. Musculoskeletal: Negative for arthralgias, back pain, gait problem, joint swelling and myalgias. Skin: Negative for color change, pallor, rash and wound. Neurological: Negative for weakness and numbness.        Past Medical History:   Diagnosis Date    Acid reflux     Angina at rest [facility]) In Past    Denies Chest Pain At This Time    Arthritis     \"Knees, Back And Hips\"    Asthma     Broken teeth     Upper    CAD (coronary artery disease)     Sees Dr. Hellen Phillips a couple of heart stents- had stress test in May 2018 all ok\"    Cancer [facility])     skin cancer on face    Chronic back pain     Degenerative joint disease     Back     Diabetes mellitus ([address] Dx 2003    GI bleed 11/2009    H/O Doppler venous ultrasound 02/11/2019    No significant insufficiency noted.  H/O echocardiogram 07/14/2014    EF 50%, low normal systolic function, no wall motion abnormalities, mild concentric hypertrophy and signs of diastolic dysfunction, mildly dilated left atrium,  no pericardial effusion, mild mitral and tricuspid regurg.  H/O echocardiogram 11/2219    EF 55-60%,  There is physiological to trace amount of pericardial fluid present.  H/O percutaneous transluminal coronary angioplasty 04/30/2019    LAD stent and PIC of restent stenosis also in LAD.  Hiatal hernia     History of complete ECG     07/20/2012=NSR, leftward axis, prob normal for age, poor r-waveprogression, inferior ST-T abnormalities, consider ischemia    History of echocardiogram 01/26/2017    Ejection fraction is visually estimated at 55%. Mild concentric left ventricular hypertrophy. Mildly dilated left atrium. Mild mitral regurgitation is present.  History of nuclear stress test 01/26/2017    cardiolite-moderate ischemia mid anteroseptal,EF70%    History of nuclear stress test 07/12/2018    Normal    Hx of 24 hour EKG monitoring 12/21/2011    brief run of SVT, otherwise noth clinically sig. arrhythmia noted    Hx of cardiovascular stress test 7/25/14 1/23/12 7/14 EF70% normal study 1/23/12=thallium stress=no scintigraphic inducible myocardial ischemia, EF 70%;  03/2011=EF 70%; 03/2010=EF 70%; 12/1997; 02/1997    Hx of cardiovascular stress test 05/04/2020    Normal stress test    Hx of CT scan     05/08/2011=CT chest with contrast=examination is moderately degraded by respiratory motion. No evidence for acute pulmonary thromboembolic disease.  Dilated main pulmonary artery suggesting elevated pulm artery pressures    Hx of Doppler ultrasound     bilateral carotid 03/11/2011= no significant stenosis present    Hx of echocardiogram     03/11/2011=mildly hypertreophied left ventricle, EF 60%, mild pulm. htn, mild aortic stenosis; 02/1997   [facility] OTHER MEDICAL     Primary Care Physician Is Dr. Nancy Beard Hyperlipidemia     Hypertension     Hyperthyroidism     Hypothyroidism, adult     Kidney stones Last Episode In 2000's    \"Passed Kidney Stones Three Different Times\"    Left hip pain     \"for months- had hip pain\"for steroid injection 7/5/2016    Migraines     Normal cardiac stress test 05/12/2016    EF70% Normal    ORLIN (obstructive sleep apnea)     doesnt use cpap, uses o2 2l as needed at night    Post PTCA     2008, 2010, 2016    RECEIVED BLOOD TRANSFUSION 11/2009    RECEIVED BLOOD TRANSFUSION, NO REACTION TO THE BLOOD TRANSFUSION RECEIVED    Shortness of breath on exertion     Sleep apnea     NO CPAP- last sleep study done 2011    Teeth missing     Upper And Lower    Ulcer 11/2009    Stomach, GI Bleeding, Received Blood Transfusions    Urinary incontinence     \"wear a pad all the time\"    UTI (urinary tract infection) In Past     No Current Symptoms    Venous US Dup     Wears glasses     Wears partial dentures     Upper       Objective:   Physical Exam  Constitutional:       Appearance: She is well-developed. HENT:      Head: Normocephalic. Eyes:      Pupils: Pupils are equal, round, and reactive to light. Pulmonary:      Effort: Pulmonary effort is normal.   Musculoskeletal:         General: No swelling, tenderness or deformity. Normal range of motion. Cervical back: Normal range of motion. Right hip: No deformity, lacerations, tenderness, bony tenderness or crepitus. Normal range of motion. Normal strength. Left hip: No deformity, lacerations, tenderness, bony tenderness or crepitus. Normal range of motion. Normal strength.       Right knee: Normal.      Left knee: Normal.   Skin:     General: Skin is warm and dry. Capillary Refill: Capillary refill takes less than 2 seconds. Coloration: Skin is not pale. Findings: No erythema or rash. Neurological:      Mental Status: She is alert and oriented to person, place, and time. Left hip-Incision clean, dry, intact, with no erythema. There is a 1 cm x 2 cm pyogenic granuloma at the central portion of the incision with a mild drainage  No groin pain with range of motion of left hip. XR HIP 1 VW W PELVIS LEFT    Result Date: 4/4/2022  XRAY X-ray 2 views of the left hip and AP pelvis obtained and reviewed by me today in the office demonstrates age appropriate bone density throughout with well-positioned left total hip arthroplasty, there has been no change in position components compared to prior x-rays, no signs of loosening or wear, the previously seen subcutaneous air pockets are no longer visible. No acute osseous abnormalities. Impression: Stable left total hip arthroplasty with no acute process. Assessment:      Left HANANE, 6 months      Plan:       I discussed with her today her x-ray findings. I explained to her that her implants are stable and remain in good alignment. I discussed with her today that she is continuing to progress very well. I explained to her that at this point given her nonhealing pyogenic granuloma I recommend additional surgical treatment. I discussed with her today performing excision of her left hip pyogenic granuloma with incisional wound VAC placement. I explained risks, benefits, possible complications of the procedure and answered all questions for the patient. I explained postoperative rehabilitation protocol and expectations with the patient today. The patient understands and consents to the procedure. Patient will follow up with their primary care physician prior to surgical treatment for preoperative clearance. We will schedule surgery at soonest convenience.   Continue weight-bearing as tolerated. Continue range of motion exercises as instructed. Ice and elevate as needed. Tylenol or Motrin for pain. Follow up in 2 weeks postop. We will plan on proceeding with surgical treatment next week.               Chace Concepcion, DO

## 2022-04-04 NOTE — TELEPHONE ENCOUNTER
Scheduled patient in office for I&D of LT Hip on 4/13/2022 at [facility]. Patient voiced understanding of date/time and instructions. Procedure form faxed. Insurance [facility] [facility]) contacted 4/4/22--[phone] does not require authorization.   Ref#: [id number]

## 2022-04-04 NOTE — PATIENT INSTRUCTIONS
We will schedule surgery at soonest convenience.  If you have any questions regarding your surgery please call our office and ask to speak with Army Anderson [phone]

## 2022-04-04 NOTE — PROGRESS NOTES
Patient returns to the office with a left HANANE, DOS: 10/12/21. Patient stated overall her hip is not giving her too much pain and does not have complications when walking. However, she stated her drainage stopped and has resumed in the last couple of weeks. Pt stated previously she had clear drainage but it has thicenked up and stated it has been green and yellow. Pt stated at times she will have cold chills but not a noticeable fever. Pt denies any falls an is not taking any antibiotics.

## 2022-04-05 ENCOUNTER — ANESTHESIA EVENT (OUTPATIENT)
Dept: OPERATING ROOM | Age: 72
End: 2022-04-05
Payer: MEDICARE

## 2022-04-05 ASSESSMENT — ENCOUNTER SYMPTOMS: SHORTNESS OF BREATH: 1

## 2022-04-05 NOTE — ANESTHESIA PRE PROCEDURE
Department of Anesthesiology  Preprocedure Note       Name:  Jaguar Logan   Age:  66 y.o.  :  1950                                          MRN:  [id number]         Date:  2022      Surgeon: Estefania Hernandez):  Dina Ulloa DO    Procedure: LEFT HIP INCISION AND DRAINAGE (Left )    Medications prior to admission:   Prior to Admission medications    Medication Sig Start Date End Date Taking?  Authorizing Provider   doxycycline hyclate (VIBRAMYCIN) 100 MG capsule  22   Historical Provider, MD   albuterol sulfate  (90 Base) MCG/ACT inhaler INHALE 2 PUFFS INTO THE LUNGS EVERY 6 HOURS AS NEEDED FOR WHEEZING  Patient not taking: Reported on 21  YOBANI Trammell CNP   Lancets ([address]) [facility] USE AS DIRECTED TWICE DAILY 21   Historical Provider, MD   ONETOUCH ULTRA strip TEST AS DIRECTED TWICE DAILY 21   Historical Provider, MD   famotidine (PEPCID) 20 MG tablet  21   Historical Provider, MD   SYMBICORT 160-4.5 MCG/ACT AERO INHALE 2 PUFFS INTO THE LUNGS TWICE DAILY 21   YOBANI Trammell CNP   Spacer/Aero-Holding Dena Oliver 1 Device by Does not apply route daily as needed (use with albuterol rescue inhaler) 21   YOBANI Trammell CNP   Ferrous Sulfate (IRON) 28 MG TABS Take by mouth daily   Patient not taking: Reported on 2022    Historical Provider, MD   ranolazine (RANEXA) 500 MG extended release tablet Take 1 tablet by mouth 2 times daily 20   YOBANI Rajan CNP   pantoprazole (PROTONIX) 40 MG tablet Take 40 mg by mouth daily    Historical Provider, MD   metFORMIN (GLUCOPHAGE) 500 MG tablet TK 2 T PO QAM AND 3 T PO QPM WITH MEALS 19   YOBANI Edward CNP   clopidogrel (PLAVIX) 75 MG tablet Take 1 tablet by mouth daily 19   YOBANI Edward CNP   OXYGEN Inhale into the lungs    Historical Provider, MD   quinapril (ACCUPRIL) 40 MG tablet Take 1 tablet by mouth nightly 10/17/17   Wesley Sanchez MD   carvedilol (COREG) 12.5 MG tablet Take 1 tablet by mouth 2 times daily (with meals)  Patient taking differently: Take 6.25 mg by mouth 2 times daily (with meals)  10/17/17   Wesley Sanchez MD   atorvastatin (LIPITOR) 20 MG tablet Take 1 tablet by mouth nightly 10/17/17   Wesley Sanchez MD   hydrochlorothiazide (HYDRODIURIL) 25 MG tablet Take 1 tablet by mouth daily Takes 12.5 daily 1/2 of a 25 mg tablet 10/17/17   Wesley Sanchez MD   meclizine (ANTIVERT) 25 MG tablet TK 1 T PO  Q 4 H PRF  DIZZINESS  Patient not taking: Reported on 1/21/2022 11/9/16   Historical Provider, MD   B Complex Vitamins (VITAMIN B COMPLEX PO) Take by mouth every morning     Historical Provider, MD   vitamin D (CHOLECALCIFEROL) 1000 UNIT TABS tablet Take 1,000 Units by mouth daily    Historical Provider, MD   aspirin 81 MG tablet Take 81 mg by mouth every morning. Last Dose Taken 9-2-14 Due To Scheduled Surgery    Historical Provider, MD   glipiZIDE (GLUCOTROL) 5 MG tablet Take 5 mg by mouth 2 times daily (before meals). Historical Provider, MD   levothyroxine (SYNTHROID) 150 MCG tablet Take 150 mcg by mouth nightly.     Historical Provider, MD       Current medications:    Current Outpatient Medications   Medication Sig Dispense Refill    doxycycline hyclate (VIBRAMYCIN) 100 MG capsule       albuterol sulfate  (90 Base) MCG/ACT inhaler INHALE 2 PUFFS INTO THE LUNGS EVERY 6 HOURS AS NEEDED FOR WHEEZING (Patient not taking: Reported on 4/4/2022) 76.5 g 3    Lancets (ONETOUCH DELICA PLUS [id number]) MISC USE AS DIRECTED TWICE DAILY      ONETOUCH ULTRA strip TEST AS DIRECTED TWICE DAILY      famotidine (PEPCID) 20 MG tablet       SYMBICORT 160-4.5 MCG/ACT AERO INHALE 2 PUFFS INTO THE LUNGS TWICE DAILY 1 Inhaler 5    Spacer/Aero-Holding Chambers HEATHER 1 Device by Does not apply route daily as needed (use with albuterol rescue inhaler) 1 Device 0    Ferrous Sulfate (IRON) 28 MG TABS Take by mouth daily  (Patient not taking: Reported on 1/21/2022)      ranolazine (RANEXA) 500 MG extended release tablet Take 1 tablet by mouth 2 times daily 60 tablet 5    pantoprazole (PROTONIX) 40 MG tablet Take 40 mg by mouth daily      metFORMIN (GLUCOPHAGE) 500 MG tablet TK 2 T PO QAM AND 3 T PO QPM WITH MEALS 60 tablet 11    clopidogrel (PLAVIX) 75 MG tablet Take 1 tablet by mouth daily 30 tablet 3    OXYGEN Inhale into the lungs      quinapril (ACCUPRIL) 40 MG tablet Take 1 tablet by mouth nightly 90 tablet 3    carvedilol (COREG) 12.5 MG tablet Take 1 tablet by mouth 2 times daily (with meals) (Patient taking differently: Take 6.25 mg by mouth 2 times daily (with meals) ) 1180 tablet 3    atorvastatin (LIPITOR) 20 MG tablet Take 1 tablet by mouth nightly 90 tablet 3    hydrochlorothiazide (HYDRODIURIL) 25 MG tablet Take 1 tablet by mouth daily Takes 12.5 daily 1/2 of a 25 mg tablet 90 tablet 3    meclizine (ANTIVERT) 25 MG tablet TK 1 T PO  Q 4 H PRF  DIZZINESS (Patient not taking: Reported on 1/21/2022)  3    B Complex Vitamins (VITAMIN B COMPLEX PO) Take by mouth every morning       vitamin D (CHOLECALCIFEROL) 1000 UNIT TABS tablet Take 1,000 Units by mouth daily      aspirin 81 MG tablet Take 81 mg by mouth every morning. Last Dose Taken 9-2-14 Due To Scheduled Surgery      glipiZIDE (GLUCOTROL) 5 MG tablet Take 5 mg by mouth 2 times daily (before meals).  levothyroxine (SYNTHROID) 150 MCG tablet Take 150 mcg by mouth nightly. No current facility-administered medications for this visit. Allergies:     Allergies   Allergen Reactions    Codeine Nausea Only       Problem List:    Patient Active Problem List   Diagnosis Code    Chest pain R07.9    Pulmonary arterial hypertension (HCC) I27.21    Disease of thyroid gland E07.9    Hyperlipidemia E78.5    Hypertension I10    Diabetes mellitus ([facility] [address] E11.9    CAD (coronary artery disease) I25.10    Dyspnea on exertion R06.00    Primary osteoarthritis of left hip M16.12    Type 2 diabetes mellitus without complication ([facility]) E00.0    S/P PTCA (percutaneous transluminal coronary angioplasty) Z98.61    Arthritis M19.90    Radial styloid tenosynovitis of right hand M65.4    S/P cardiac cath Z98.890    Kidney disorder N28.9    Mental disorder F99    Obstructive sleep apnea G47.33    Change in bowel function R19.8    Percutaneous transluminal coronary angioplasty (PTCA) within last 14 to 24 months Z98.61    Unstable angina ([facility]) I20.0    Status post angioplasty Z98.62    Restrictive lung disease secondary to obesity J98.4, E66.9    Nocturnal hypoxia G47.34    Morbid obesity with BMI of 40.0-44.9, adult ([facility]) E66.01, Z68.41    History of left hip replacement Z96.642       Past Medical History:        Diagnosis Date    Acid reflux     Angina at rest [facility]) In Past    Denies Chest Pain At This Time    Arthritis     \"Knees, Back And Hips\"    Asthma     Broken teeth     Upper    CAD (coronary artery disease)     Sees Dr. Jenny Springer a couple of heart stents- had stress test in May 2018 all ok\"    Cancer [facility])     skin cancer on face    Chronic back pain     Degenerative joint disease     Back     Diabetes mellitus ([facility] [address] Dx 2003    GI bleed 11/2009    H/O Doppler venous ultrasound 02/11/2019    No significant insufficiency noted.  H/O echocardiogram 07/14/2014    EF 50%, low normal systolic function, no wall motion abnormalities, mild concentric hypertrophy and signs of diastolic dysfunction, mildly dilated left atrium,  no pericardial effusion, mild mitral and tricuspid regurg.  H/O echocardiogram 11/2219    EF 55-60%,  There is physiological to trace amount of pericardial fluid present.  H/O percutaneous transluminal coronary angioplasty 04/30/2019    LAD stent and PIC of restent stenosis also in LAD.     Hiatal hernia     History of complete ECG     07/20/2012=NSR, leftward axis, prob normal breath on exertion     Sleep apnea     NO CPAP- last sleep study done     Teeth missing     Upper And Lower    Ulcer 2009    Stomach, GI Bleeding, Received Blood Transfusions    Urinary incontinence     \"wear a pad all the time\"    UTI (urinary tract infection) In Past     No Current Symptoms    Venous US Dup     Wears glasses     Wears partial dentures     Upper       Past Surgical History:        Procedure Laterality Date    CARPAL TUNNEL RELEASE Bilateral  Right      Left     SECTION  ,     Tubal Ligation Also Done In  With     CHOLECYSTECTOMY      COLONOSCOPY  Last Done In     COLONOSCOPY  3/23/15    Internal hemorrhoids    COLONOSCOPY  04/10/2019    COLONOSCOPY N/A 4/10/2019    COLONOSCOPY POLYPECTOMY SNARE/COLD BIOPSY performed by Arlyn Hutton MD at [place] [address]  2010    balloon angioplasty of distal mid LAD;     CORONARY ANGIOPLASTY  2008    LAD stent 2.75x16 taxus    CORONARY ANGIOPLASTY  2008    2. 5x8 taxus stent to the ostium of the circ.  DENTAL SURGERY      Teeth Extracted In Past    DIAGNOSTIC CARDIAC CATH LAB PROCEDURE  2010    balloon angioplasty of distal mid LAD;     DIAGNOSTIC CARDIAC CATH LAB PROCEDURE  2008    LAD stent 2.75x16 taxus    DIAGNOSTIC CARDIAC CATH LAB PROCEDURE  2008    2. 5x8 taxus stent to the ostium of the circ.     ENDOSCOPY, COLON, DIAGNOSTIC  \"Several\"    ENDOSCOPY, COLON, DIAGNOSTIC  3/23/15    small hiatal hernia, anatomy consistent with banding, gastritis    FINGER TRIGGER RELEASE  10-09 \"One On Each Hand Trigger Finger Release\"     \"Left Middle Finger Trigger Finger Release\"    HYSTERECTOMY      Partial Abdominal Hysterectomy, \"Pinned The Bladder Up\"    JOINT REPLACEMENT Right     Total Right Hip    JOINT REPLACEMENT Right     Total Right Knee    JOINT REPLACEMENT Left     Total Left Knee    JOINT REPLACEMENT Left     Revision Total Left Knee    KNEE SURGERY Right ,     KNEE SURGERY Left     Left Knee Bone Graft    KNEE SURGERY Right     Right Knee Bone Graft    OTHER SURGICAL HISTORY      \"Stomach Stapling\" Pre Surgery Weight Was 230 lbs    OTHER SURGICAL HISTORY Left 04/10/2017    left hip steroid injection    PTCA  16    Stenting of the LAD.  SHOULDER ARTHROSCOPY Right 2-10    SHOULDER ARTHROSCOPY Left 09/10/14    TONSILLECTOMY AND ADENOIDECTOMY  1964    TOTAL HIP ARTHROPLASTY Left 10/12/2021    LEFT HIP TOTAL ARTHROPLASTY POSTERIOR performed by Margarito Hardy DO at [place] [address] With     WISDOM TOOTH EXTRACTION      All [place] Teeth Extracted In Past       Social History:    Social History     Tobacco Use    Smoking status: Former Smoker     Packs/day: 0.25     Years: 38.00     Pack years: 9.50     Types: Cigarettes     Start date: 1962     Quit date: 2000     Years since quittin.5    Smokeless tobacco: Never Used    Tobacco comment: \"never a heavy smoker just occ smoker\"   Substance Use Topics    Alcohol use: No                                Counseling given: Not Answered  Comment: \"never a heavy smoker just occ smoker\"      Vital Signs (Current): There were no vitals filed for this visit.                                            BP Readings from Last 3 Encounters:   22 120/80   10/29/21 129/76   10/13/21 (!) 151/86       NPO Status:                                                                                 BMI:   Wt Readings from Last 3 Encounters:   22 220 lb (99.8 kg)   22 224 lb (101.6 kg)   22 219 lb (99.3 kg)     There is no height or weight on file to calculate BMI.    CBC:   Lab Results   Component Value Date    WBC 8.2 10/29/2021    RBC 3.72 10/29/2021    HGB 10.3 10/29/2021    HCT 34.5 10/29/2021    MCV 92.7 10/29/2021    RDW 15.5 10/29/2021     10/29/2021       CMP: Lab Results   Component Value Date     10/29/2021    K 3.9 10/29/2021    CL 97 10/29/2021    CO2 28 10/29/2021    BUN 11 10/29/2021    CREATININE 0.6 10/29/2021    GFRAA >60 10/29/2021    LABGLOM >60 10/29/2021    GLUCOSE 177 10/29/2021    PROT 6.2 10/29/2021    PROT 6.6 03/12/2013    CALCIUM 9.5 10/29/2021    BILITOT 1.0 10/29/2021    ALKPHOS 142 10/29/2021    AST 12 10/29/2021    ALT 10 10/29/2021       POC Tests: No results for input(s): POCGLU, POCNA, POCK, POCCL, POCBUN, POCHEMO, POCHCT in the last 72 hours.     Coags:   Lab Results   Component Value Date    PROTIME 11.5 04/29/2019    PROTIME 11.8 12/20/2011    INR 1.01 04/29/2019    APTT 23.5 04/29/2019       HCG (If Applicable): No results found for: PREGTESTUR, PREGSERUM, HCG, HCGQUANT     ABGs: No results found for: PHART, PO2ART, AQW8DZD, UNE3TWA, BEART, [id number]     Type & Screen (If Applicable):  No results found for: LABABO, [address]    Anesthesia Evaluation  Patient summary reviewed no history of anesthetic complications:   Airway: Mallampati: III  TM distance: >3 FB   Neck ROM: limited  Mouth opening: < 3 FB Dental:    (+) poor dentition  Comment: Poor dentition    Pulmonary:normal exam    (+) shortness of breath:  sleep apnea:  asthma:                           ROS comment: 2 l o2 hs prn    Cardiovascular:  Exercise tolerance: poor (<4 METS),   (+) hypertension:, valvular problems/murmurs: AS, angina:, CAD:, CABG/stent:, dysrhythmias: atrial fibrillation, ECHAVARRIA:, pulmonary hypertension: mild, hyperlipidemia      ECG reviewed      Echocardiogram reviewed  Stress test reviewed  Cleared by cardiology     Beta Blocker:  Dose within 24 Hrs      ROS comment:  Sinus rhythm with premature atrial complexes   Left axis deviation   Abnormal ECG   When compared with ECG of 13-MAY-2021 09:37,   Sinus rhythm has replaced Atrial fibrillation   No significant change was found     Confirmed by Neela Brewer MD, Branden Seaman ([id number]) on 9/29/2021 11:55:14 AM    Resulting Maria Elena Araiza physician Dr. Verónica Alicea .   Normal LV function. normal EDV   There is normal isotope uptake following exercise and at rest. There is no   evidence of exercise induced ischemia.   This is a normal study.      Recommendation   Recommendation: Routine follow-up.      Signatures      ------------------------------------------------------------------   Electronically signed by Samina Espinal MD   (Interpreting cardiologist) on 05/05/2021 at 16:19       Summary   LV function and size are normal, Ejection Fraction 55-60 %. Normal left ventricular wall thickness. No regional wall motion abnormalities were detected. Diastolic Dysfunction Grade I .   Mildly dilated left atrium. No significant valvular abnormalities. RVSP= 24 mmHg. There is physiological to trace amount of pericardial fluid present.       Signature      ------------------------------------------------------------------   Electronically signed by Samina Espinal MD   (Interpreting physician) on 11/22/2019 at 03:15 PM     Procedure Summary   severe mid LAD stenosis and severe 2nd lesion at the distal mid   LAD restenosis, s/p successful PCI with OSWALD and successful PTCA   of instent restenosis with NC balloon      Recommendations   Load plavix and aspirin      Signatures      --------------------------------------------------------   Electronically signed by Samina Espinal MD (Performing Physician) on 04/30/2019 at 09:02   --------------------------------------------------------    Waldo Rosas was evaluated from a cardiac standpoint for her surgery or procedure and based on her history, diagnosis, recent cardiac testing, she is considered a medium risk candidate for any meghana-operative cardiac complications.     Patients with known CAD with moderate or high risk should have surgical procedures done where they have access to invasive cardiology services if emergently needed.     Antiplatelet/anticoagulant therapy can be held prior to the surgery or procedure at the discretion of the surgeon to be resumed as soon as possible if held.     Please call with any further questions.     Respectfully,     THAI Bhatt  As/bl  This cardiac clearance is good for 6 months from 8/31/2021 unless new cardiac symptoms arise. Neuro/Psych:   (+) headaches: migraine headaches, psychiatric history:            GI/Hepatic/Renal:   (+) hiatal hernia, GERD:, morbid obesity (bmi 41.15)     (-) bowel prep       Endo/Other:    (+) DiabetesType II DM, , hypothyroidism, blood dyscrasia: anticoagulation therapy, arthritis: OA., malignancy/cancer (skin). Abdominal:             Vascular: negative vascular ROS. Other Findings:             Anesthesia Plan      general     ASA 3       Induction: intravenous. MIPS: Postoperative opioids intended and Prophylactic antiemetics administered. Pre Anesthesia Evaluation complete. Anesthesia plan, risks, benefits, alternatives, and personal involved discussed with patient. Patients and/or legal guardian verbalized an understanding  and agreed to proceed.   Arturo Maguire DO  4/13/2022

## 2022-04-07 RX ORDER — VIT C/B6/B5/MAGNESIUM/HERB 173 50-5-6-5MG
500 CAPSULE ORAL DAILY
COMMUNITY

## 2022-04-07 NOTE — PROGRESS NOTES
Covid screen done - 4/7/22 4/7/22 [id number] I left a voicemail with my call back number and a request to return my call to complete the phone PAT, covid scren, and review pre-op instructions. I did leave pre-op instructions. ADDENDUM: 4/7/22 [id number] Patient answered my call and completed the PAT assessment. Surgery is at [facility] on 4/13/22. You will be called on 4/12/22 with times. 1. Do not eat or drink anything after midnight - unless instructed by your doctor prior to surgery. This includes no water, chewing gum or mints. 2. Follow your directions as prescribed by the doctor for your procedure and medications. 3. Check with your Doctor regarding stopping vitamins, supplements, blood thinners (Plavix, Coumadin, Lovenox, Effient, Pradaxa, Xarelto, Fragmin or other blood thinners) and follow their instructions. Stop all supplements and herbals until after surgery. Aspirin and Plavix -last dose on 4/7/22 per [place] [facility]     Morning of surgery use your inhaler,  take carvedilol, famotidine, synthroid, metformin, and  Protonix with a sip of water. 4. Do not smoke, and do not drink any alcoholic beverages 24 hours prior to surgery. This includes NA Beer. 5. You may brush your teeth and gargle the morning of surgery. DO NOT SWALLOW WATER   6. You MUST make arrangements for a responsible adult to take you home after your surgery and be able to check on you every couple hours for the day. You will not be allowed to leave alone or drive yourself home. It is strongly suggested someone stay with you the first 24  hrs. Your surgery will be cancelled if you do not have a ride home. 7. Please wear simple, loose fitting clothing to the hospital.  [place] Or not bring valuables (money, credit cards, checkbooks, etc.) Do not wear any makeup (including no eye makeup) or nail polish on your fingers or toes. 8. DO NOT wear any jewelry or piercings on day of surgery.   All body piercing jewelry must be removed. 9. If you have dentures, they will be removed before going to the OR; we will provide you a container. If you wear contact lenses or glasses,  they will be removed; please bring a case for them. 10. If you  have a Living Will and Durable Power of  for Healthcare, please bring in a copy. 11. Please bring picture ID,  insurance card, paperwork from the doctors office    (H & P, Consent, & card for implantable devices). 12. Take a shower the night before or morning of your procedure, do not apply any lotion, oil or powder. It is recommended to use Hibiclens or CHG wash during pre-op shower/bath.            13. Wear a mask covering your nose & mouth when entering the hospital.

## 2022-04-08 ENCOUNTER — HOSPITAL ENCOUNTER (OUTPATIENT)
Age: 72
Discharge: HOME | End: 2022-04-08
Payer: MEDICARE

## 2022-04-08 DIAGNOSIS — Z01.818 ENCOUNTER FOR PREADMISSION TESTING: ICD-10-CM

## 2022-04-08 LAB
ANION GAP SERPL CALCULATED.3IONS-SCNC: 15 MMOL/L (ref 4–16)
BASOPHILS ABSOLUTE: 0.1 K/CU MM
BASOPHILS RELATIVE PERCENT: 0.6 % (ref 0–1)
BUN BLDV-MCNC: 10 MG/DL (ref 6–23)
CALCIUM SERPL-MCNC: 10.1 MG/DL (ref 8.3–10.6)
CHLORIDE BLD-SCNC: 99 MMOL/L (ref 99–110)
CO2: 25 MMOL/L (ref 21–32)
CREAT SERPL-MCNC: 0.7 MG/DL (ref 0.6–1.1)
DIFFERENTIAL TYPE: ABNORMAL
EOSINOPHILS ABSOLUTE: 0.2 K/CU MM
EOSINOPHILS RELATIVE PERCENT: 2.4 % (ref 0–3)
GFR AFRICAN AMERICAN: >60 ML/MIN/1.73M2
GFR NON-AFRICAN AMERICAN: >60 ML/MIN/1.73M2
GLUCOSE BLD-MCNC: 197 MG/DL (ref 70–99)
HCT VFR BLD CALC: 41.5 % (ref 37–47)
HEMOGLOBIN: 12.5 GM/DL (ref 12.5–16)
IMMATURE NEUTROPHIL %: 0.4 % (ref 0–0.43)
LYMPHOCYTES ABSOLUTE: 2.8 K/CU MM
LYMPHOCYTES RELATIVE PERCENT: 29.7 % (ref 24–44)
MCH RBC QN AUTO: 26.7 PG (ref 27–31)
MCHC RBC AUTO-ENTMCNC: 30.1 % (ref 32–36)
MCV RBC AUTO: 88.7 FL (ref 78–100)
MONOCYTES ABSOLUTE: 0.8 K/CU MM
MONOCYTES RELATIVE PERCENT: 8.8 % (ref 0–4)
NUCLEATED RBC %: 0 %
PDW BLD-RTO: 16.1 % (ref 11.7–14.9)
PLATELET # BLD: 248 K/CU MM (ref 140–440)
PMV BLD AUTO: 11.2 FL (ref 7.5–11.1)
POTASSIUM SERPL-SCNC: 4.6 MMOL/L (ref 3.5–5.1)
RBC # BLD: 4.68 M/CU MM (ref 4.2–5.4)
SEGMENTED NEUTROPHILS ABSOLUTE COUNT: 5.4 K/CU MM
SEGMENTED NEUTROPHILS RELATIVE PERCENT: 58.1 % (ref 36–66)
SODIUM BLD-SCNC: 139 MMOL/L (ref 135–145)
TOTAL IMMATURE NEUTOROPHIL: 0.04 K/CU MM
TOTAL NUCLEATED RBC: 0 K/CU MM
WBC # BLD: 9.4 K/CU MM (ref 4–10.5)

## 2022-04-08 PROCEDURE — 85025 COMPLETE CBC W/AUTO DIFF WBC: CPT

## 2022-04-08 PROCEDURE — 80048 BASIC METABOLIC PNL TOTAL CA: CPT

## 2022-04-08 PROCEDURE — 36415 COLL VENOUS BLD VENIPUNCTURE: CPT

## 2022-04-12 NOTE — PROGRESS NOTES
Spoke with patient and she will arrive at 0600 at [facility] on 4/13/2022 for her procedure at [address] [place] [address]

## 2022-04-13 ENCOUNTER — HOSPITAL ENCOUNTER (OUTPATIENT)
Age: 72
Setting detail: OUTPATIENT SURGERY
Discharge: HOME | End: 2022-04-13
Attending: ORTHOPAEDIC SURGERY | Admitting: ORTHOPAEDIC SURGERY
Payer: MEDICARE

## 2022-04-13 ENCOUNTER — ANESTHESIA (OUTPATIENT)
Dept: OPERATING ROOM | Age: 72
End: 2022-04-13
Payer: MEDICARE

## 2022-04-13 VITALS
OXYGEN SATURATION: 93 % | SYSTOLIC BLOOD PRESSURE: 126 MMHG | RESPIRATION RATE: 2 BRPM | TEMPERATURE: 97.7 F | DIASTOLIC BLOOD PRESSURE: 67 MMHG

## 2022-04-13 VITALS
BODY MASS INDEX: 40.48 KG/M2 | TEMPERATURE: 97.3 F | RESPIRATION RATE: 16 BRPM | HEIGHT: 62 IN | DIASTOLIC BLOOD PRESSURE: 75 MMHG | WEIGHT: 220 LBS | HEART RATE: 62 BPM | OXYGEN SATURATION: 95 % | SYSTOLIC BLOOD PRESSURE: 135 MMHG

## 2022-04-13 DIAGNOSIS — Z96.642 HISTORY OF LEFT HIP REPLACEMENT: ICD-10-CM

## 2022-04-13 DIAGNOSIS — Z01.818 ENCOUNTER FOR PREADMISSION TESTING: Primary | ICD-10-CM

## 2022-04-13 DIAGNOSIS — T84.52XA INFECTION AND INFLAMMATORY REACTION DUE TO INTERNAL LEFT HIP PROSTHESIS, INITIAL ENCOUNTER (HCC): ICD-10-CM

## 2022-04-13 LAB
GLUCOSE BLD-MCNC: 160 MG/DL (ref 70–99)
GLUCOSE BLD-MCNC: 184 MG/DL (ref 70–99)

## 2022-04-13 PROCEDURE — 6360000002 HC RX W HCPCS: Performed by: ORTHOPAEDIC SURGERY

## 2022-04-13 PROCEDURE — 3700000000 HC ANESTHESIA ATTENDED CARE: Performed by: ORTHOPAEDIC SURGERY

## 2022-04-13 PROCEDURE — 6370000000 HC RX 637 (ALT 250 FOR IP): Performed by: ORTHOPAEDIC SURGERY

## 2022-04-13 PROCEDURE — 87070 CULTURE OTHR SPECIMN AEROBIC: CPT

## 2022-04-13 PROCEDURE — 7100000010 HC PHASE II RECOVERY - FIRST 15 MIN: Performed by: ORTHOPAEDIC SURGERY

## 2022-04-13 PROCEDURE — 7100000011 HC PHASE II RECOVERY - ADDTL 15 MIN: Performed by: ORTHOPAEDIC SURGERY

## 2022-04-13 PROCEDURE — 3700000001 HC ADD 15 MINUTES (ANESTHESIA): Performed by: ORTHOPAEDIC SURGERY

## 2022-04-13 PROCEDURE — 82962 GLUCOSE BLOOD TEST: CPT

## 2022-04-13 PROCEDURE — 2580000003 HC RX 258: Performed by: ORTHOPAEDIC SURGERY

## 2022-04-13 PROCEDURE — 6360000002 HC RX W HCPCS: Performed by: ANESTHESIOLOGY

## 2022-04-13 PROCEDURE — 87186 SC STD MICRODIL/AGAR DIL: CPT

## 2022-04-13 PROCEDURE — 87077 CULTURE AEROBIC IDENTIFY: CPT

## 2022-04-13 PROCEDURE — 7100000000 HC PACU RECOVERY - FIRST 15 MIN: Performed by: ORTHOPAEDIC SURGERY

## 2022-04-13 PROCEDURE — 26990 DRAINAGE OF PELVIS LESION: CPT | Performed by: ORTHOPAEDIC SURGERY

## 2022-04-13 PROCEDURE — A4217 STERILE WATER/SALINE, 500 ML: HCPCS | Performed by: ORTHOPAEDIC SURGERY

## 2022-04-13 PROCEDURE — 6360000002 HC RX W HCPCS: Performed by: NURSE ANESTHETIST, CERTIFIED REGISTERED

## 2022-04-13 PROCEDURE — 87075 CULTR BACTERIA EXCEPT BLOOD: CPT

## 2022-04-13 PROCEDURE — 87205 SMEAR GRAM STAIN: CPT

## 2022-04-13 PROCEDURE — 3600000002 HC SURGERY LEVEL 2 BASE: Performed by: ORTHOPAEDIC SURGERY

## 2022-04-13 PROCEDURE — 2500000003 HC RX 250 WO HCPCS: Performed by: NURSE ANESTHETIST, CERTIFIED REGISTERED

## 2022-04-13 PROCEDURE — 3600000012 HC SURGERY LEVEL 2 ADDTL 15MIN: Performed by: ORTHOPAEDIC SURGERY

## 2022-04-13 PROCEDURE — 2709999900 HC NON-CHARGEABLE SUPPLY: Performed by: ORTHOPAEDIC SURGERY

## 2022-04-13 PROCEDURE — 7100000001 HC PACU RECOVERY - ADDTL 15 MIN: Performed by: ORTHOPAEDIC SURGERY

## 2022-04-13 RX ORDER — IPRATROPIUM BROMIDE AND ALBUTEROL SULFATE 2.5; .5 MG/3ML; MG/3ML
1 SOLUTION RESPIRATORY (INHALATION)
Status: DISCONTINUED | OUTPATIENT
Start: 2022-04-13 | End: 2022-04-13 | Stop reason: HOSPADM

## 2022-04-13 RX ORDER — SODIUM CHLORIDE 0.9 % (FLUSH) 0.9 %
10 SYRINGE (ML) INJECTION PRN
Status: CANCELLED | OUTPATIENT
Start: 2022-04-13

## 2022-04-13 RX ORDER — SULFAMETHOXAZOLE AND TRIMETHOPRIM 400; 80 MG/1; MG/1
1 TABLET ORAL DAILY
Qty: 10 TABLET | Refills: 0 | Status: SHIPPED | OUTPATIENT
Start: 2022-04-13 | End: 2022-04-23

## 2022-04-13 RX ORDER — OXYCODONE HYDROCHLORIDE 5 MG/1
5 TABLET ORAL EVERY 4 HOURS PRN
Status: CANCELLED | OUTPATIENT
Start: 2022-04-13

## 2022-04-13 RX ORDER — PROPOFOL 10 MG/ML
INJECTION, EMULSION INTRAVENOUS PRN
Status: DISCONTINUED | OUTPATIENT
Start: 2022-04-13 | End: 2022-04-13 | Stop reason: SDUPTHER

## 2022-04-13 RX ORDER — CEFAZOLIN SODIUM 2 G/100ML
2000 INJECTION, SOLUTION INTRAVENOUS
Status: COMPLETED | OUTPATIENT
Start: 2022-04-13 | End: 2022-04-13

## 2022-04-13 RX ORDER — SODIUM CHLORIDE 0.9 % (FLUSH) 0.9 %
10 SYRINGE (ML) INJECTION PRN
Status: DISCONTINUED | OUTPATIENT
Start: 2022-04-13 | End: 2022-04-13 | Stop reason: HOSPADM

## 2022-04-13 RX ORDER — FENTANYL CITRATE 50 UG/ML
50 INJECTION, SOLUTION INTRAMUSCULAR; INTRAVENOUS EVERY 5 MIN PRN
Status: COMPLETED | OUTPATIENT
Start: 2022-04-13 | End: 2022-04-13

## 2022-04-13 RX ORDER — LIDOCAINE HYDROCHLORIDE 20 MG/ML
INJECTION, SOLUTION INTRAVENOUS PRN
Status: DISCONTINUED | OUTPATIENT
Start: 2022-04-13 | End: 2022-04-13 | Stop reason: SDUPTHER

## 2022-04-13 RX ORDER — MEPERIDINE HYDROCHLORIDE 25 MG/ML
12.5 INJECTION INTRAMUSCULAR; INTRAVENOUS; SUBCUTANEOUS EVERY 5 MIN PRN
Status: DISCONTINUED | OUTPATIENT
Start: 2022-04-13 | End: 2022-04-13 | Stop reason: HOSPADM

## 2022-04-13 RX ORDER — ONDANSETRON 2 MG/ML
4 INJECTION INTRAMUSCULAR; INTRAVENOUS EVERY 6 HOURS PRN
Status: CANCELLED | OUTPATIENT
Start: 2022-04-13

## 2022-04-13 RX ORDER — SODIUM CHLORIDE 9 MG/ML
25 INJECTION, SOLUTION INTRAVENOUS PRN
Status: CANCELLED | OUTPATIENT
Start: 2022-04-13

## 2022-04-13 RX ORDER — SODIUM CHLORIDE 9 MG/ML
25 INJECTION, SOLUTION INTRAVENOUS PRN
Status: DISCONTINUED | OUTPATIENT
Start: 2022-04-13 | End: 2022-04-13 | Stop reason: HOSPADM

## 2022-04-13 RX ORDER — FENTANYL CITRATE 50 UG/ML
INJECTION, SOLUTION INTRAMUSCULAR; INTRAVENOUS PRN
Status: DISCONTINUED | OUTPATIENT
Start: 2022-04-13 | End: 2022-04-13 | Stop reason: SDUPTHER

## 2022-04-13 RX ORDER — ACETAMINOPHEN 500 MG
1000 TABLET ORAL ONCE
Status: COMPLETED | OUTPATIENT
Start: 2022-04-13 | End: 2022-04-13

## 2022-04-13 RX ORDER — OXYCODONE HYDROCHLORIDE 5 MG/1
10 TABLET ORAL EVERY 4 HOURS PRN
Status: CANCELLED | OUTPATIENT
Start: 2022-04-13

## 2022-04-13 RX ORDER — SODIUM CHLORIDE 0.9 % (FLUSH) 0.9 %
10 SYRINGE (ML) INJECTION EVERY 12 HOURS SCHEDULED
Status: CANCELLED | OUTPATIENT
Start: 2022-04-13

## 2022-04-13 RX ORDER — HYDRALAZINE HYDROCHLORIDE 20 MG/ML
10 INJECTION INTRAMUSCULAR; INTRAVENOUS
Status: DISCONTINUED | OUTPATIENT
Start: 2022-04-13 | End: 2022-04-13 | Stop reason: HOSPADM

## 2022-04-13 RX ORDER — ONDANSETRON 4 MG/1
4 TABLET, ORALLY DISINTEGRATING ORAL EVERY 8 HOURS PRN
Status: CANCELLED | OUTPATIENT
Start: 2022-04-13

## 2022-04-13 RX ORDER — METOCLOPRAMIDE HYDROCHLORIDE 5 MG/ML
10 INJECTION INTRAMUSCULAR; INTRAVENOUS
Status: DISCONTINUED | OUTPATIENT
Start: 2022-04-13 | End: 2022-04-13 | Stop reason: HOSPADM

## 2022-04-13 RX ORDER — SODIUM CHLORIDE, SODIUM LACTATE, POTASSIUM CHLORIDE, CALCIUM CHLORIDE 600; 310; 30; 20 MG/100ML; MG/100ML; MG/100ML; MG/100ML
INJECTION, SOLUTION INTRAVENOUS CONTINUOUS
Status: DISCONTINUED | OUTPATIENT
Start: 2022-04-13 | End: 2022-04-13 | Stop reason: HOSPADM

## 2022-04-13 RX ORDER — LABETALOL HYDROCHLORIDE 5 MG/ML
10 INJECTION, SOLUTION INTRAVENOUS
Status: DISCONTINUED | OUTPATIENT
Start: 2022-04-13 | End: 2022-04-13 | Stop reason: HOSPADM

## 2022-04-13 RX ORDER — DIPHENHYDRAMINE HYDROCHLORIDE 50 MG/ML
12.5 INJECTION INTRAMUSCULAR; INTRAVENOUS
Status: DISCONTINUED | OUTPATIENT
Start: 2022-04-13 | End: 2022-04-13 | Stop reason: HOSPADM

## 2022-04-13 RX ORDER — SODIUM CHLORIDE 0.9 % (FLUSH) 0.9 %
10 SYRINGE (ML) INJECTION EVERY 12 HOURS SCHEDULED
Status: DISCONTINUED | OUTPATIENT
Start: 2022-04-13 | End: 2022-04-13 | Stop reason: HOSPADM

## 2022-04-13 RX ORDER — HYDROCODONE BITARTRATE AND ACETAMINOPHEN 5; 325 MG/1; MG/1
1 TABLET ORAL EVERY 6 HOURS PRN
Qty: 15 TABLET | Refills: 0 | Status: SHIPPED | OUTPATIENT
Start: 2022-04-13 | End: 2022-04-20

## 2022-04-13 RX ORDER — ONDANSETRON 2 MG/ML
INJECTION INTRAMUSCULAR; INTRAVENOUS PRN
Status: DISCONTINUED | OUTPATIENT
Start: 2022-04-13 | End: 2022-04-13 | Stop reason: SDUPTHER

## 2022-04-13 RX ORDER — SODIUM CHLORIDE, SODIUM LACTATE, POTASSIUM CHLORIDE, CALCIUM CHLORIDE 600; 310; 30; 20 MG/100ML; MG/100ML; MG/100ML; MG/100ML
INJECTION, SOLUTION INTRAVENOUS CONTINUOUS
Status: CANCELLED | OUTPATIENT
Start: 2022-04-13

## 2022-04-13 RX ADMIN — SUGAMMADEX 50 MG: 100 INJECTION, SOLUTION INTRAVENOUS at 10:34

## 2022-04-13 RX ADMIN — FENTANYL CITRATE 50 MCG: 50 INJECTION, SOLUTION INTRAMUSCULAR; INTRAVENOUS at 09:43

## 2022-04-13 RX ADMIN — LIDOCAINE HYDROCHLORIDE 50 MG: 20 INJECTION, SOLUTION INTRAVENOUS at 09:43

## 2022-04-13 RX ADMIN — ACETAMINOPHEN 1000 MG: 500 TABLET ORAL at 06:53

## 2022-04-13 RX ADMIN — CEFAZOLIN SODIUM 2000 MG: 2 INJECTION, SOLUTION INTRAVENOUS at 09:43

## 2022-04-13 RX ADMIN — ONDANSETRON 4 MG: 2 INJECTION INTRAMUSCULAR; INTRAVENOUS at 09:43

## 2022-04-13 RX ADMIN — SODIUM CHLORIDE, POTASSIUM CHLORIDE, SODIUM LACTATE AND CALCIUM CHLORIDE: 600; 310; 30; 20 INJECTION, SOLUTION INTRAVENOUS at 07:05

## 2022-04-13 RX ADMIN — PROPOFOL 100 MG: 10 INJECTION, EMULSION INTRAVENOUS at 09:43

## 2022-04-13 RX ADMIN — FENTANYL CITRATE 50 MCG: 50 INJECTION INTRAMUSCULAR; INTRAVENOUS at 11:13

## 2022-04-13 RX ADMIN — SUGAMMADEX 50 MG: 100 INJECTION, SOLUTION INTRAVENOUS at 10:29

## 2022-04-13 RX ADMIN — FENTANYL CITRATE 50 MCG: 50 INJECTION INTRAMUSCULAR; INTRAVENOUS at 11:05

## 2022-04-13 RX ADMIN — SUGAMMADEX 50 MG: 100 INJECTION, SOLUTION INTRAVENOUS at 10:38

## 2022-04-13 RX ADMIN — FENTANYL CITRATE 50 MCG: 50 INJECTION, SOLUTION INTRAMUSCULAR; INTRAVENOUS at 10:45

## 2022-04-13 RX ADMIN — SUGAMMADEX 50 MG: 100 INJECTION, SOLUTION INTRAVENOUS at 10:37

## 2022-04-13 ASSESSMENT — PAIN DESCRIPTION - PAIN TYPE
TYPE: SURGICAL PAIN

## 2022-04-13 ASSESSMENT — PAIN SCALES - GENERAL
PAINLEVEL_OUTOF10: 7
PAINLEVEL_OUTOF10: 4
PAINLEVEL_OUTOF10: 4
PAINLEVEL_OUTOF10: 6
PAINLEVEL_OUTOF10: 8

## 2022-04-13 ASSESSMENT — PULMONARY FUNCTION TESTS
PIF_VALUE: 23
PIF_VALUE: 14
PIF_VALUE: 22
PIF_VALUE: 34
PIF_VALUE: 23
PIF_VALUE: 33
PIF_VALUE: 24
PIF_VALUE: 2
PIF_VALUE: 23
PIF_VALUE: 23
PIF_VALUE: 28
PIF_VALUE: 5
PIF_VALUE: 22
PIF_VALUE: 22
PIF_VALUE: 23
PIF_VALUE: 11
PIF_VALUE: 23
PIF_VALUE: 30
PIF_VALUE: 1
PIF_VALUE: 5
PIF_VALUE: 22
PIF_VALUE: 22
PIF_VALUE: 0
PIF_VALUE: 8
PIF_VALUE: 23
PIF_VALUE: 1
PIF_VALUE: 22
PIF_VALUE: 1
PIF_VALUE: 23
PIF_VALUE: 4
PIF_VALUE: 23
PIF_VALUE: 1
PIF_VALUE: 22
PIF_VALUE: 11
PIF_VALUE: 22
PIF_VALUE: 29
PIF_VALUE: 1
PIF_VALUE: 2
PIF_VALUE: 22
PIF_VALUE: 5
PIF_VALUE: 22
PIF_VALUE: 22
PIF_VALUE: 29
PIF_VALUE: 23
PIF_VALUE: 23
PIF_VALUE: 31
PIF_VALUE: 22
PIF_VALUE: 2
PIF_VALUE: 22
PIF_VALUE: 29
PIF_VALUE: 22
PIF_VALUE: 22
PIF_VALUE: 29
PIF_VALUE: 22
PIF_VALUE: 32
PIF_VALUE: 23
PIF_VALUE: 22
PIF_VALUE: 23
PIF_VALUE: 1
PIF_VALUE: 23
PIF_VALUE: 23
PIF_VALUE: 5
PIF_VALUE: 23
PIF_VALUE: 3

## 2022-04-13 ASSESSMENT — PAIN DESCRIPTION - DESCRIPTORS
DESCRIPTORS: ACHING

## 2022-04-13 ASSESSMENT — PAIN DESCRIPTION - ORIENTATION
ORIENTATION: LEFT

## 2022-04-13 ASSESSMENT — PAIN - FUNCTIONAL ASSESSMENT
PAIN_FUNCTIONAL_ASSESSMENT: 0-10
PAIN_FUNCTIONAL_ASSESSMENT: ACTIVITIES ARE NOT PREVENTED

## 2022-04-13 ASSESSMENT — PAIN DESCRIPTION - LOCATION
LOCATION: HIP

## 2022-04-13 NOTE — H&P
Subjective:      Patient ID: Jose Xie is a 66 y.o. female.     Patient returns to the office with a left HANANE, DOS: 10/12/21. Patient stated overall her hip is not giving her too much pain and does not have complications when walking. However, she stated her drainage stopped and has resumed in the last couple of weeks. Pt stated previously she had clear drainage but it has thicenked up and stated it has been green and yellow. Pt stated at times she will have cold chills but not a noticeable fever. Pt denies any falls an is not taking any antibiotics.      She comes in today for her 6-month postop recheck. She states that overall she is not having any pain in her left hip or groin at all. She states that the incision completely healed over and was not draining for about 2 months but then over the last couple weeks she has begun having drainage from the same portion of her incision again. She continues to describe this as a dark, yellow drainage. .    Patient denies any new injury to the involved extremity/ joint, denies numbness or tingling in the involved extremity and denies fever or chills. She is not able to sleep on the left side without much difficulty.                 Hip Pain   Pertinent negatives include no numbness.         Review of Systems   Constitutional: Negative for activity change, chills and fever. Respiratory: Negative for chest tightness. Cardiovascular: Negative for chest pain. Musculoskeletal: Negative for arthralgias, back pain, gait problem, joint swelling and myalgias. Skin: Negative for color change, pallor, rash and wound.    Neurological: Negative for weakness and numbness.         Past Medical History        Past Medical History:   Diagnosis Date    Acid reflux      Angina at rest [facility]) In Past     Denies Chest Pain At This Time    Arthritis       \"Knees, Back And Hips\"    Asthma      Broken teeth       Upper    CAD (coronary artery disease)       Sees  Eva\"have a couple of heart stents- had stress test in May 2018 all ok\"    Cancer [facility])       skin cancer on face    Chronic back pain      Degenerative joint disease       Back     Diabetes mellitus ([address] Dx 2003    GI bleed 11/2009    H/O Doppler venous ultrasound 02/11/2019     No significant insufficiency noted.  H/O echocardiogram 07/14/2014     EF 50%, low normal systolic function, no wall motion abnormalities, mild concentric hypertrophy and signs of diastolic dysfunction, mildly dilated left atrium,  no pericardial effusion, mild mitral and tricuspid regurg.  H/O echocardiogram 11/2219     EF 55-60%,  There is physiological to trace amount of pericardial fluid present.  H/O percutaneous transluminal coronary angioplasty 04/30/2019     LAD stent and PIC of restent stenosis also in LAD.  Hiatal hernia      History of complete ECG       07/20/2012=NSR, leftward axis, prob normal for age, poor r-waveprogression, inferior ST-T abnormalities, consider ischemia    History of echocardiogram 01/26/2017     Ejection fraction is visually estimated at 55%. Mild concentric left ventricular hypertrophy. Mildly dilated left atrium. Mild mitral regurgitation is present.  History of nuclear stress test 01/26/2017     cardiolite-moderate ischemia mid anteroseptal,EF70%    History of nuclear stress test 07/12/2018     Normal    Hx of 24 hour EKG monitoring 12/21/2011     brief run of SVT, otherwise noth clinically sig. arrhythmia noted    Hx of cardiovascular stress test 7/25/14 1/23/12 7/14 EF70% normal study 1/23/12=thallium stress=no scintigraphic inducible myocardial ischemia, EF 70%;  03/2011=EF 70%; 03/2010=EF 70%; 12/1997; 02/1997    Hx of cardiovascular stress test 05/04/2020     Normal stress test    Hx of CT scan       05/08/2011=CT chest with contrast=examination is moderately degraded by respiratory motion. No evidence for acute pulmonary thromboembolic disease.  Dilated main pulmonary artery suggesting elevated pulm artery pressures    Hx of Doppler ultrasound       bilateral carotid 03/11/2011= no significant stenosis present    Hx of echocardiogram       03/11/2011=mildly hypertreophied left ventricle, EF 60%, mild pulm. htn, mild aortic stenosis; 02/1997    HX OTHER MEDICAL       Primary Care Physician Is Dr. Stacey Palomino Hyperlipidemia      Hypertension      Hyperthyroidism      Hypothyroidism, adult      Kidney stones Last Episode In 2000's     \"Passed Kidney Stones Three Different Times\"    Left hip pain       \"for months- had hip pain\"for steroid injection 7/5/2016    Migraines      Normal cardiac stress test 05/12/2016     EF70% Normal    ORLIN (obstructive sleep apnea)       doesnt use cpap, uses o2 2l as needed at night    Post PTCA       2008, 2010, 2016    RECEIVED BLOOD TRANSFUSION 11/2009     RECEIVED BLOOD TRANSFUSION, NO REACTION TO THE BLOOD TRANSFUSION RECEIVED    Shortness of breath on exertion      Sleep apnea       NO CPAP- last sleep study done 2011    Teeth missing       Upper And Lower    Ulcer 11/2009     Stomach, GI Bleeding, Received Blood Transfusions    Urinary incontinence       \"wear a pad all the time\"    UTI (urinary tract infection) In Past      No Current Symptoms    Venous US Dup      Wears glasses      Wears partial dentures       Upper            No current facility-administered medications on file prior to encounter.      Current Outpatient Medications on File Prior to Encounter   Medication Sig Dispense Refill    turmeric 500 MG CAPS Take 500 mg by mouth daily      albuterol sulfate  (90 Base) MCG/ACT inhaler INHALE 2 PUFFS INTO THE LUNGS EVERY 6 HOURS AS NEEDED FOR WHEEZING (Patient not taking: Reported on 4/4/2022) 76.5 g 3    Lancets (ONETOUCH DELICA PLUS [id number]) MISC USE AS DIRECTED TWICE DAILY      ONETOUCH ULTRA strip TEST AS DIRECTED TWICE DAILY      famotidine (PEPCID) 20 MG tablet       SYMBICORT 160-4.5 MCG/ACT AERO INHALE 2 PUFFS INTO THE LUNGS TWICE DAILY 1 Inhaler 5    Spacer/Aero-Holding Chambers HEATHER 1 Device by Does not apply route daily as needed (use with albuterol rescue inhaler) 1 Device 0    Ferrous Sulfate (IRON) 28 MG TABS Take by mouth daily       ranolazine (RANEXA) 500 MG extended release tablet Take 1 tablet by mouth 2 times daily 60 tablet 5    pantoprazole (PROTONIX) 40 MG tablet Take 40 mg by mouth daily      metFORMIN (GLUCOPHAGE) 500 MG tablet TK 2 T PO QAM AND 3 T PO QPM WITH MEALS 60 tablet 11    clopidogrel (PLAVIX) 75 MG tablet Take 1 tablet by mouth daily 30 tablet 3    OXYGEN Inhale into the lungs      quinapril (ACCUPRIL) 40 MG tablet Take 1 tablet by mouth nightly (Patient taking differently: Take 40 mg by mouth 2 times daily 1 Tab morning, 1/2 tab night) 90 tablet 3    carvedilol (COREG) 12.5 MG tablet Take 1 tablet by mouth 2 times daily (with meals) (Patient taking differently: Take 6.25 mg by mouth 2 times daily (with meals) ) 1180 tablet 3    atorvastatin (LIPITOR) 20 MG tablet Take 1 tablet by mouth nightly 90 tablet 3    hydrochlorothiazide (HYDRODIURIL) 25 MG tablet Take 1 tablet by mouth daily Takes 12.5 daily 1/2 of a 25 mg tablet 90 tablet 3    B Complex Vitamins (VITAMIN B COMPLEX PO) Take by mouth every morning       vitamin D (CHOLECALCIFEROL) 1000 UNIT TABS tablet Take 1,000 Units by mouth daily      aspirin 81 MG tablet Take 81 mg by mouth every morning. Last Dose Taken 14 Due To Scheduled Surgery      glipiZIDE (GLUCOTROL) 5 MG tablet Take 5 mg by mouth 2 times daily (before meals).         levothyroxine (SYNTHROID) 150 MCG tablet Take 150 mcg by mouth Daily        Allergies   Allergen Reactions    Codeine Nausea Only     Past Surgical History:   Procedure Laterality Date    CARPAL TUNNEL RELEASE Bilateral  Right     1989 Left     SECTION  1975    Tubal Ligation Also Done In  With    [address] [place] [address] [address] [address]  COLONOSCOPY  Last Done In 2000's    COLONOSCOPY  3/23/15    Internal hemorrhoids    COLONOSCOPY  04/10/2019    COLONOSCOPY N/A 4/10/2019    COLONOSCOPY POLYPECTOMY SNARE/COLD BIOPSY performed by Judge Keegan MD at [place] [address]  05/12/2010    balloon angioplasty of distal mid LAD;     CORONARY ANGIOPLASTY  04/08/2008    LAD stent 2.75x16 taxus    CORONARY ANGIOPLASTY  01/23/2008    2. 5x8 taxus stent to the ostium of the circ.  DENTAL SURGERY      Teeth Extracted In Past    DIAGNOSTIC CARDIAC CATH LAB PROCEDURE  05/12/2010    balloon angioplasty of distal mid LAD;     DIAGNOSTIC CARDIAC CATH LAB PROCEDURE  04/08/2008    LAD stent 2.75x16 taxus    DIAGNOSTIC CARDIAC CATH LAB PROCEDURE  01/23/2008    2. 5x8 taxus stent to the ostium of the circ.  ENDOSCOPY, COLON, DIAGNOSTIC  \"Several\"    ENDOSCOPY, COLON, DIAGNOSTIC  3/23/15    small hiatal hernia, anatomy consistent with banding, gastritis    FINGER TRIGGER RELEASE  10-09 \"One On Each Hand Trigger Finger Release\"    7-11 \"Left Middle Finger Trigger Finger Release\"    HYSTERECTOMY  1982    Partial Abdominal Hysterectomy, \"Pinned The Bladder Up\"    JOINT REPLACEMENT Right 8-11    Total Right Hip    JOINT REPLACEMENT Right 9-07    Total Right Knee    JOINT REPLACEMENT Left 6-03    Total Left Knee    JOINT REPLACEMENT Left 12-13    Revision Total Left Knee    KNEE SURGERY Right 1978, 1980    KNEE SURGERY Left 1994    Left Knee Bone Graft    KNEE SURGERY Right 1985    Right Knee Bone Graft    OTHER SURGICAL HISTORY  1982    \"Stomach Stapling\" Pre Surgery Weight Was 230 lbs    OTHER SURGICAL HISTORY Left 04/10/2017    left hip steroid injection    PTCA  05/13/16    Stenting of the LAD.     SHOULDER ARTHROSCOPY Right 2-10    SHOULDER ARTHROSCOPY Left 09/10/14    TONSILLECTOMY AND ADENOIDECTOMY  1964    TOTAL HIP ARTHROPLASTY Left 10/12/2021    LEFT HIP TOTAL ARTHROPLASTY POSTERIOR performed by Harvel Olszewski, DO at [facility]    TUBAL LIGATION  1975    Done With     WISDOM TOOTH EXTRACTION      All [place] Teeth Extracted In Past     Social History     Tobacco Use    Smoking status: Former Smoker     Packs/day: 0.25     Years: 38.00     Pack years: 9.50     Types: Cigarettes     Start date: 1962     Quit date: 2000     Years since quittin.6    Smokeless tobacco: Never Used    Tobacco comment: \"never a heavy smoker just occ smoker\"   Vaping Use    Vaping Use: Never used   Substance Use Topics    Alcohol use: No    Drug use: No     Family History   Problem Relation Age of Onset    Cancer Mother         Liver Cancer    Heart Disease Mother     High Blood Pressure Mother     Asthma Mother     Cancer Father         Colon Cancer    Heart Disease Father     High Blood Pressure Father     Colon Cancer Father     Arthritis Sister     Early Death Sister 52    High Blood Pressure Sister     Other Sister         \"Breathing Problem\"    Heart Disease Son         Open Heart Surgery    Diabetes Son     High Blood Pressure Son     Mental Illness Daughter         Bipolar    Early Death Sister 61        Liver Cancer    Cancer Sister         Liver Cancer    Stomach Cancer Neg Hx        Objective:   Physical Exam  Constitutional:       Appearance: She is well-developed. HENT:      Head: Normocephalic. Eyes:      Pupils: Pupils are equal, round, and reactive to light. Pulmonary:      Effort: Pulmonary effort is normal.   Musculoskeletal:         General: No swelling, tenderness or deformity. Normal range of motion. Cervical back: Normal range of motion. Right hip: No deformity, lacerations, tenderness, bony tenderness or crepitus. Normal range of motion. Normal strength. Left hip: No deformity, lacerations, tenderness, bony tenderness or crepitus. Normal range of motion. Normal strength. Right knee: Normal.      Left knee: Normal.   Skin:     General: Skin is warm and dry. Capillary Refill: Capillary refill takes less than 2 seconds. Coloration: Skin is not pale. Findings: No erythema or rash. Neurological:      Mental Status: She is alert and oriented to person, place, and time. Left hip-Incision clean, dry, intact, with no erythema. There is a 1 cm x 2 cm pyogenic granuloma at the central portion of the incision with a mild drainage  No groin pain with range of motion of left hip.        XR HIP 1 VW W PELVIS LEFT     Result Date: 4/4/2022  XRAY X-ray 2 views of the left hip and AP pelvis obtained and reviewed by me today in the office demonstrates age appropriate bone density throughout with well-positioned left total hip arthroplasty, there has been no change in position components compared to prior x-rays, no signs of loosening or wear, the previously seen subcutaneous air pockets are no longer visible. No acute osseous abnormalities. Impression: Stable left total hip arthroplasty with no acute process.       BP (!) 145/64   Pulse 68   Temp 98.2 °F (36.8 °C) (Temporal)   Resp 18   Ht 5' 2\" (1.575 m)   Wt 220 lb (99.8 kg)   SpO2 94%   BMI 40.24 kg/m²        Assessment:   Left HANANE, 6 months                Plan:       I discussed with her today her x-ray findings. I explained to her that her implants are stable and remain in good alignment. I discussed with her today that she is continuing to progress very well. I explained to her that at this point given her nonhealing pyogenic granuloma I recommend additional surgical treatment. I discussed with her today performing excision of her left hip pyogenic granuloma with incisional wound VAC placement. I explained risks, benefits, possible complications of the procedure and answered all questions for the patient. I explained postoperative rehabilitation protocol and expectations with the patient today. The patient understands and consents to the procedure.     Patient will follow up with their primary

## 2022-04-13 NOTE — OP NOTE
DATE OF PROCEDURE:   4/13/2022     PREOPERATIVE DIAGNOSIS:  Left  pyogenic granuloma     POSTOPERATIVE DIAGNOSIS:  Left  pyogenic granuloma with sinus tract. Rhona Dye PROCEDURE:   Incision and excisional debridement and drainage left hip, approximately 3 cm². SURGEON:  Lily Alfred DO.     ANESTHESIA:   General.     ESTIMATED BLOOD LOSS:   50 mL. FLUIDS:  800 mL crystalloids. SPECIMEN:   1. Culture left hip superficial  2. Soft tissue specimen left hip pyogenic granuloma. INDICATIONS OF PROCEDURE:  The patient is a 66year-old female who about 5 months ago underwent left total hip arthroplasty. About 2 weeks ago she developed drainage from the lateral aspect of the incision site and was showing signs of a pyogenic granuloma at the distal aspect of the incision. She is not having any pain with range of motion of the left hip with no fevers or redness. She continued to have sharp pain over the drainage site at which point I recommended additional surgical treatment. I explained the risks, benefits and  possible complications of the procedure to the patient and after  answering all of her questions, she consented to undergo the above  procedure. DESCRIPTION OF PROCEDURE:  The patient was seen and evaluated in the  preoperative holding area where the left lower extremity was signed  in her presence. At this point, care of the patient was turned over  to anesthesia team, who transported her back to the operative suite. General  anesthesia was performed and once adequate anesthesia was obtained, she was  placed in the lateral decubitus position. The left lower extremity was  prepped and draped in usual sterile fashion. Preoperative antibiotics  were administered. At this point, a time-out was performed and all in attendance were in agreement. I marked out the planned surgical incision overlying the lateral  aspect of the left hip overlying her previous surgical scar.   I then made

## 2022-04-13 NOTE — PROGRESS NOTES
1140- Patient arrived back to [facility]. Report given to this nurse from  [facility] Patient A&O x4. Wound vac working properly. Beverage of choice offered to patient. Call light in reach and bed in lowest position. , spouse at bedside. 1221-IV removed discharge instructions given to patient and her spouse both verbalized understanding  1225 Patient sitting on side of bed getting dressed, spouse at bedside  [phone]- Patient escorted to car via wheelchair spouse is taking the patient home.

## 2022-04-13 NOTE — ANESTHESIA POSTPROCEDURE EVALUATION
Department of Anesthesiology  Postprocedure Note    Patient: Riley García  MRN: [id number]  [place]: 1950  Date of evaluation: 4/13/2022  Time:  12:14 PM     Procedure Summary     Date: 04/13/22 Room / Location: [place] [address] / [facility]    Anesthesia Start: 0940 Anesthesia Stop: 1059    Procedure: LEFT HIP INCISION AND DRAINAGE (Left Hip) Diagnosis:       Infection and inflammatory reaction due to internal left hip prosthesis, initial encounter ([address]      (Infection and inflammatory reaction due to internal left hip prosthesis, initial encounter ([facility] [T53.18NQ])    Surgeons: Florian Rowe DO Responsible Provider: Alexandria Primrose, DO    Anesthesia Type: general ASA Status: 3          Anesthesia Type: general    Oskar Phase I: Oskar Score: 8    Oskar Phase II: Oskar Score: 10    Last vitals: Reviewed and per EMR flowsheets.        Anesthesia Post Evaluation    Patient location during evaluation: PACU  Patient participation: complete - patient participated  Level of consciousness: sleepy but conscious  Airway patency: patent  Nausea & Vomiting: no nausea  Complications: no  Cardiovascular status: hemodynamically stable  Respiratory status: acceptable  Hydration status: euvolemic

## 2022-04-13 NOTE — BRIEF OP NOTE
Brief Postoperative Note      Patient: Yunior Salguero  YOB: 1950  MRN: [id number]    Date of Procedure: 4/13/2022    Pre-Op Diagnosis: Infection and inflammatory reaction due to internal left hip prosthesis, initial encounter ([facility] Miracle Gonzalez    Post-Op Diagnosis: Same       Procedure(s):  LEFT HIP INCISION AND DRAINAGE    Surgeon(s):  Louisa Botello DO    Assistant:  * No surgical staff found *    Anesthesia: General    Estimated Blood Loss (mL): less than 50     Complications: None    Specimens:   ID Type Source Tests Collected by Time Destination   1 : Left hip fluid culture Body Fluid Fluid CULTURE, BODY FLUID Louisa Botello DO 4/13/2022 1014    2 : Left hip tissue for culture Tissue Tissue CULTURE, TISSUE Louisa Botello DO 4/13/2022 1013        Implants:  * No implants in log *      Drains: * No LDAs found *    Findings: L hip pyogenic granuloma    Electronically signed by Chace Concepcion DO on 4/13/2022 at 10:16 AM

## 2022-04-15 LAB
CULTURE: ABNORMAL
CULTURE: ABNORMAL
Lab: ABNORMAL
SPECIMEN: ABNORMAL

## 2022-04-19 LAB
CULTURE: ABNORMAL
CULTURE: ABNORMAL
Lab: ABNORMAL
SPECIMEN: ABNORMAL

## 2022-04-27 ENCOUNTER — OFFICE VISIT (OUTPATIENT)
Dept: ORTHOPEDIC SURGERY | Age: 72
End: 2022-04-27

## 2022-04-27 VITALS
WEIGHT: 218 LBS | RESPIRATION RATE: 16 BRPM | HEART RATE: 73 BPM | BODY MASS INDEX: 40.12 KG/M2 | OXYGEN SATURATION: 98 % | HEIGHT: 62 IN

## 2022-04-27 DIAGNOSIS — Z98.890 STATUS POST INCISION AND DRAINAGE: Primary | ICD-10-CM

## 2022-04-27 PROCEDURE — 99024 POSTOP FOLLOW-UP VISIT: CPT | Performed by: PHYSICIAN ASSISTANT

## 2022-04-27 NOTE — PROGRESS NOTES
Date of surgery:   April 13th   Surgeon: Dr. Donny Lui  History:  Ms. Marcus Fisher is here in follow up regarding her left left hip I&D. She previously had a left total hip replacement and then subsequently the incision continues to drain for a period of time. At one point it appeared to stop draining and then it started draining again and it was determined that she would need to be taken back and have the incision I&D. Part of the incision was opened up with the rest of it was left alone. She is doing rather well. She is having 4/10 pain. She denies chest pain, SOB, calf pain,fever,wound drainage. She states that she has not had any drainage from the incision at all. She states that she does have some pain and she states it is like a quick stab in the hip on the side but it goes away pretty quickly and overall she feels she is doing well. Physical:  Vitals:    04/27/22 0838   Pulse: 73   Resp: 16   SpO2: 98%   Weight: 218 lb (98.9 kg)   Height: 5' 2\" (1.575 m)     Left hip incision is well-healed with Prolene suture in place. There is no erythema present, no active drainage whatsoever.     Impression: Status post incision and drainage of left hip     Plan:   Patient Instructions   Weightbearing and activities as tolerated  May take Ibuprofen or Motrin as needed  Rest, ice, and elevate as needed  Follow up in 5 weeks with Dr. Donny Lui

## 2022-04-27 NOTE — PATIENT INSTRUCTIONS
Weightbearing and activities as tolerated  May take Ibuprofen or Motrin as needed  Rest, ice, and elevate as needed  Follow up in 5 weeks with Dr. Kerrie Leal

## 2022-05-09 ENCOUNTER — OFFICE VISIT (OUTPATIENT)
Dept: ORTHOPEDIC SURGERY | Age: 72
End: 2022-05-09

## 2022-05-09 VITALS
SYSTOLIC BLOOD PRESSURE: 134 MMHG | WEIGHT: 218 LBS | RESPIRATION RATE: 15 BRPM | OXYGEN SATURATION: 98 % | HEART RATE: 77 BPM | HEIGHT: 62 IN | DIASTOLIC BLOOD PRESSURE: 106 MMHG | TEMPERATURE: 97.2 F | BODY MASS INDEX: 40.12 KG/M2

## 2022-05-09 DIAGNOSIS — Z98.890 STATUS POST INCISION AND DRAINAGE: Primary | ICD-10-CM

## 2022-05-09 PROCEDURE — 99024 POSTOP FOLLOW-UP VISIT: CPT | Performed by: ORTHOPAEDIC SURGERY

## 2022-05-09 ASSESSMENT — ENCOUNTER SYMPTOMS
COLOR CHANGE: 0
CHEST TIGHTNESS: 0
BACK PAIN: 0

## 2022-05-09 NOTE — PROGRESS NOTES
Subjective:      Patient ID: Mike Alberto is a 66 y.o. female. She comes in today for her 4-week postop recheck after I&D of her left hip. She states that since surgery she is feeling much better and no longer has much pain in the left hip. The sutures were removed about a week ago and she states that she was doing well but a couple of days ago she did develop some small amount of clear drainage that she went to get checked out. Patient denies any new injury to the involved extremity/ joint, denies numbness or tingling in the involved extremity and denies fever or chills. Hip Pain   Pertinent negatives include no numbness. Review of Systems   Constitutional: Negative for activity change, chills and fever. Respiratory: Negative for chest tightness. Cardiovascular: Negative for chest pain. Musculoskeletal: Negative for arthralgias, back pain, gait problem, joint swelling and myalgias. Skin: Negative for color change, pallor, rash and wound. Neurological: Negative for weakness and numbness. Past Medical History:   Diagnosis Date    Acid reflux     Angina at rest [facility]) In Past    Denies Chest Pain At This Time    Arthritis     \"Knees, Back And Hips\"    Asthma     Broken teeth     Upper    CAD (coronary artery disease)     Sees Dr. Devin Gan a couple of heart stents- had stress test in May 2018 all ok\"    Cancer [facility])     skin cancer on face    Chronic back pain     Degenerative joint disease     Back     Diabetes mellitus ([facility] [address] Dx 2003    GI bleed 11/2009    H/O Doppler venous ultrasound 02/11/2019    No significant insufficiency noted.  H/O echocardiogram 07/14/2014    EF 50%, low normal systolic function, no wall motion abnormalities, mild concentric hypertrophy and signs of diastolic dysfunction, mildly dilated left atrium,  no pericardial effusion, mild mitral and tricuspid regurg.     H/O echocardiogram 11/2219    EF 55-60%,  There is physiological to trace amount of pericardial fluid present.  H/O percutaneous transluminal coronary angioplasty 04/30/2019    LAD stent and PIC of restent stenosis also in LAD.  Hiatal hernia     History of complete ECG     07/20/2012=NSR, leftward axis, prob normal for age, poor r-waveprogression, inferior ST-T abnormalities, consider ischemia    History of echocardiogram 01/26/2017    Ejection fraction is visually estimated at 55%. Mild concentric left ventricular hypertrophy. Mildly dilated left atrium. Mild mitral regurgitation is present.  History of nuclear stress test 01/26/2017    cardiolite-moderate ischemia mid anteroseptal,EF70%    History of nuclear stress test 07/12/2018    Normal    Hx of 24 hour EKG monitoring 12/21/2011    brief run of SVT, otherwise noth clinically sig. arrhythmia noted    Hx of cardiovascular stress test 7/25/14 1/23/12 7/14 EF70% normal study 1/23/12=thallium stress=no scintigraphic inducible myocardial ischemia, EF 70%;  03/2011=EF 70%; 03/2010=EF 70%; 12/1997; 02/1997    Hx of cardiovascular stress test 05/04/2020    Normal stress test    Hx of CT scan     05/08/2011=CT chest with contrast=examination is moderately degraded by respiratory motion. No evidence for acute pulmonary thromboembolic disease.  Dilated main pulmonary artery suggesting elevated pulm artery pressures    Hx of Doppler ultrasound     bilateral carotid 03/11/2011= no significant stenosis present    Hx of echocardiogram     03/11/2011=mildly hypertreophied left ventricle, EF 60%, mild pulm. htn, mild aortic stenosis; 02/1997   [facility] OTHER MEDICAL     Primary Care Physician Is Dr. Angie Gillespie Hyperlipidemia     Hypertension     Hyperthyroidism     Hypothyroidism, adult     Kidney stones Last Episode In 2000's    \"Passed Kidney Stones Three Different Times\"    Left hip pain     \"for months- had hip pain\"for steroid injection 7/5/2016    Migraines     Normal cardiac stress test 05/12/2016    EF70% Normal    ORLIN (obstructive sleep apnea)     doesnt use cpap, uses o2 2l as needed at night    Post PTCA     2008, 2010, 2016    RECEIVED BLOOD TRANSFUSION 11/2009    RECEIVED BLOOD TRANSFUSION, NO REACTION TO THE BLOOD TRANSFUSION RECEIVED    Shortness of breath on exertion     Sleep apnea     NO CPAP- last sleep study done 2011    Teeth missing     Upper And Lower    Ulcer 11/2009    Stomach, GI Bleeding, Received Blood Transfusions    Urinary incontinence     \"wear a pad all the time\"    UTI (urinary tract infection) In Past     No Current Symptoms    Venous US Dup     Wears glasses     Wears partial dentures     Upper       Objective:   Physical Exam  Constitutional:       Appearance: She is well-developed. HENT:      Head: Normocephalic. Eyes:      Pupils: Pupils are equal, round, and reactive to light. Pulmonary:      Effort: Pulmonary effort is normal.   Musculoskeletal:         General: No swelling, tenderness or deformity. Normal range of motion. Cervical back: Normal range of motion. Right hip: No deformity, lacerations, tenderness, bony tenderness or crepitus. Normal range of motion. Normal strength. Left hip: No deformity, lacerations, tenderness, bony tenderness or crepitus. Normal range of motion. Normal strength. Right knee: Normal.      Left knee: Normal.   Skin:     General: Skin is warm and dry. Capillary Refill: Capillary refill takes less than 2 seconds. Coloration: Skin is not pale. Findings: No erythema or rash. Neurological:      Mental Status: She is alert and oriented to person, place, and time. Left hip-Incision clean, dry, intact, with no erythema. No fluctuance, no redness, very small pinhole with clear drainage at the distal aspect of the incision. No groin pain with range of motion of the left hip. No pain with weightbearing on the left leg.         Assessment:       Left hip I&D 4 weeks  Left HANANE, 7 months        Plan: I reassured her today that this drainage is normal and there is no sign of infection in her left hip. Continue open to air is much as possible. Continue weight-bearing as tolerated. Continue range of motion exercises as instructed. Ice and elevate as needed. Tylenol or Motrin for pain. Follow up in 6 weeks for recheck with x-rays of the left hip and we will utilize this as her 1 year postop visit.             Chace 97, DO

## 2022-05-09 NOTE — PATIENT INSTRUCTIONS
Continue weight-bearing as tolerated. Continue range of motion exercises as instructed. Ice and elevate as needed. Tylenol or Motrin for pain.   Recommend leaving Incision open to the air  Follow up in 6 weeks

## 2022-05-09 NOTE — PROGRESS NOTES
Patient returns to the office today for FU of the left HANANE I&D DOS 4/13/22 Pt states she has noticed some drainage in the left hip for about a week now. Pt denies any fever or chills.

## 2022-05-09 NOTE — PROGRESS NOTES
Subjective:      Patient ID: Suma Gimenez is a 66 y.o. female. Patient returns to the office with a left HANANE, DOS: 10/12/21. Patient stated overall her hip is not giving her too much pain and does not have complications when walking. However, she stated her drainage stopped and has resumed in the last couple of weeks. Pt stated previously she had clear drainage but it has thicenked up and stated it has been green and yellow. Pt stated at times she will have cold chills but not a noticeable fever. Pt denies any falls an is not taking any antibiotics. She comes in today for her 6-month postop recheck. She states that overall she is not having any pain in her left hip or groin at all. She states that the incision completely healed over and was not draining for about 2 months but then over the last couple weeks she has begun having drainage from the same portion of her incision again. She continues to describe this as a dark, yellow drainage. .    Patient denies any new injury to the involved extremity/ joint, denies numbness or tingling in the involved extremity and denies fever or chills. She is not able to sleep on the left side without much difficulty. Hip Pain   Pertinent negatives include no numbness. Review of Systems   Constitutional: Negative for activity change, chills and fever. Respiratory: Negative for chest tightness. Cardiovascular: Negative for chest pain. Musculoskeletal: Negative for arthralgias, back pain, gait problem, joint swelling and myalgias. Skin: Negative for color change, pallor, rash and wound. Neurological: Negative for weakness and numbness.        Past Medical History:   Diagnosis Date    Acid reflux     Angina at rest [facility]) In Past    Denies Chest Pain At This Time    Arthritis     \"Knees, Back And Hips\"    Asthma     Broken teeth     Upper    CAD (coronary artery disease)     Sees Dr. Beto Lindo a couple of heart stents- had stress test in May 2018 all ok\"    Cancer [facility])     skin cancer on face    Chronic back pain     Degenerative joint disease     Back     Diabetes mellitus ([address] Dx 2003    GI bleed 11/2009    H/O Doppler venous ultrasound 02/11/2019    No significant insufficiency noted.  H/O echocardiogram 07/14/2014    EF 50%, low normal systolic function, no wall motion abnormalities, mild concentric hypertrophy and signs of diastolic dysfunction, mildly dilated left atrium,  no pericardial effusion, mild mitral and tricuspid regurg.  H/O echocardiogram 11/2219    EF 55-60%,  There is physiological to trace amount of pericardial fluid present.  H/O percutaneous transluminal coronary angioplasty 04/30/2019    LAD stent and PIC of restent stenosis also in LAD.  Hiatal hernia     History of complete ECG     07/20/2012=NSR, leftward axis, prob normal for age, poor r-waveprogression, inferior ST-T abnormalities, consider ischemia    History of echocardiogram 01/26/2017    Ejection fraction is visually estimated at 55%. Mild concentric left ventricular hypertrophy. Mildly dilated left atrium. Mild mitral regurgitation is present.  History of nuclear stress test 01/26/2017    cardiolite-moderate ischemia mid anteroseptal,EF70%    History of nuclear stress test 07/12/2018    Normal    Hx of 24 hour EKG monitoring 12/21/2011    brief run of SVT, otherwise noth clinically sig. arrhythmia noted    Hx of cardiovascular stress test 7/25/14 1/23/12 7/14 EF70% normal study 1/23/12=thallium stress=no scintigraphic inducible myocardial ischemia, EF 70%;  03/2011=EF 70%; 03/2010=EF 70%; 12/1997; 02/1997    Hx of cardiovascular stress test 05/04/2020    Normal stress test    Hx of CT scan     05/08/2011=CT chest with contrast=examination is moderately degraded by respiratory motion. No evidence for acute pulmonary thromboembolic disease.  Dilated main pulmonary artery suggesting elevated pulm artery pressures    Hx of Doppler ultrasound     bilateral carotid 03/11/2011= no significant stenosis present    Hx of echocardiogram     03/11/2011=mildly hypertreophied left ventricle, EF 60%, mild pulm. htn, mild aortic stenosis; 02/1997   [facility] OTHER MEDICAL     Primary Care Physician Is Dr. Courtney Gomez Hyperlipidemia     Hypertension     Hyperthyroidism     Hypothyroidism, adult     Kidney stones Last Episode In 2000's    \"Passed Kidney Stones Three Different Times\"    Left hip pain     \"for months- had hip pain\"for steroid injection 7/5/2016    Migraines     Normal cardiac stress test 05/12/2016    EF70% Normal    ORLIN (obstructive sleep apnea)     doesnt use cpap, uses o2 2l as needed at night    Post PTCA     2008, 2010, 2016    RECEIVED BLOOD TRANSFUSION 11/2009    RECEIVED BLOOD TRANSFUSION, NO REACTION TO THE BLOOD TRANSFUSION RECEIVED    Shortness of breath on exertion     Sleep apnea     NO CPAP- last sleep study done 2011    Teeth missing     Upper And Lower    Ulcer 11/2009    Stomach, GI Bleeding, Received Blood Transfusions    Urinary incontinence     \"wear a pad all the time\"    UTI (urinary tract infection) In Past     No Current Symptoms    Venous US Dup     Wears glasses     Wears partial dentures     Upper       Objective:   Physical Exam  Constitutional:       Appearance: She is well-developed. HENT:      Head: Normocephalic. Eyes:      Pupils: Pupils are equal, round, and reactive to light. Pulmonary:      Effort: Pulmonary effort is normal.   Musculoskeletal:         General: No swelling, tenderness or deformity. Normal range of motion. Cervical back: Normal range of motion. Right hip: No deformity, lacerations, tenderness, bony tenderness or crepitus. Normal range of motion. Normal strength. Left hip: No deformity, lacerations, tenderness, bony tenderness or crepitus. Normal range of motion. Normal strength.       Right knee: Normal.      Left knee: Normal.   Skin:     General: Skin is warm and dry. Capillary Refill: Capillary refill takes less than 2 seconds. Coloration: Skin is not pale. Findings: No erythema or rash. Neurological:      Mental Status: She is alert and oriented to person, place, and time. Left hip-Incision clean, dry, intact, with no erythema. There is a 1 cm x 2 cm pyogenic granuloma at the central portion of the incision with a mild drainage  No groin pain with range of motion of left hip. XR HIP 1 VW W PELVIS LEFT    Result Date: 4/4/2022  XRAY X-ray 2 views of the left hip and AP pelvis obtained and reviewed by me today in the office demonstrates age appropriate bone density throughout with well-positioned left total hip arthroplasty, there has been no change in position components compared to prior x-rays, no signs of loosening or wear, the previously seen subcutaneous air pockets are no longer visible. No acute osseous abnormalities. Impression: Stable left total hip arthroplasty with no acute process. Assessment:      Left HANANE, 6 months      Plan:       I discussed with her today her x-ray findings. I explained to her that her implants are stable and remain in good alignment. I discussed with her today that she is continuing to progress very well. I explained to her that at this point given her nonhealing pyogenic granuloma I recommend additional surgical treatment. I discussed with her today performing excision of her left hip pyogenic granuloma with incisional wound VAC placement. I explained risks, benefits, possible complications of the procedure and answered all questions for the patient. I explained postoperative rehabilitation protocol and expectations with the patient today. The patient understands and consents to the procedure. Patient will follow up with their primary care physician prior to surgical treatment for preoperative clearance. We will schedule surgery at soonest convenience.   Continue weight-bearing as tolerated. Continue range of motion exercises as instructed. Ice and elevate as needed. Tylenol or Motrin for pain. Follow up in 2 weeks postop. We will plan on proceeding with surgical treatment next week.               Chace Concepcion, DO

## 2022-06-20 ENCOUNTER — OFFICE VISIT (OUTPATIENT)
Dept: ORTHOPEDIC SURGERY | Age: 72
End: 2022-06-20

## 2022-06-20 VITALS
HEART RATE: 85 BPM | SYSTOLIC BLOOD PRESSURE: 135 MMHG | BODY MASS INDEX: 40.12 KG/M2 | WEIGHT: 218 LBS | RESPIRATION RATE: 16 BRPM | DIASTOLIC BLOOD PRESSURE: 72 MMHG | HEIGHT: 62 IN

## 2022-06-20 DIAGNOSIS — Z98.890 STATUS POST INCISION AND DRAINAGE: Primary | ICD-10-CM

## 2022-06-20 DIAGNOSIS — Z96.642 STATUS POST LEFT HIP REPLACEMENT: ICD-10-CM

## 2022-06-20 PROCEDURE — 99024 POSTOP FOLLOW-UP VISIT: CPT | Performed by: ORTHOPAEDIC SURGERY

## 2022-06-20 RX ORDER — GABAPENTIN 300 MG/1
300 CAPSULE ORAL 2 TIMES DAILY
COMMUNITY
Start: 2022-05-31 | End: 2023-05-31

## 2022-06-20 ASSESSMENT — ENCOUNTER SYMPTOMS
CHEST TIGHTNESS: 0
COLOR CHANGE: 0
BACK PAIN: 0

## 2022-06-20 NOTE — PROGRESS NOTES
Subjective:      Patient ID: Jose Xie is a 66 y.o. female. Patient returns to the office with a left hip I&D DOS: 4/13/22. Pt stated she has no pain today and if she does it is very minimal. Pt did notice the drainage resumed like before and has slowly been progressing worse. Pt stated the drainage is coming from the same area as before and is a green/clear fluid. Pt has noticed some cold sweats on occasion but has been feeling fine the past couple days. Pt stated she has been dressing and cleaning the wound noel    She comes in today for her 8-week postop recheck after I&D of her left hip. He states that she was doing better but then a few days ago she developed significant drainage from her left hip again. She states that she continues to saturate dressings couple times daily. She is still not having any pain in the left hip, groin or thigh. Patient denies any new injury to the involved extremity/ joint, denies numbness or tingling in the involved extremity and denies fever or chills. Hip Pain   Pertinent negatives include no numbness. Review of Systems   Constitutional: Negative for activity change, chills and fever. Respiratory: Negative for chest tightness. Cardiovascular: Negative for chest pain. Musculoskeletal: Positive for joint swelling. Negative for arthralgias, back pain, gait problem and myalgias. Skin: Positive for wound. Negative for color change, pallor and rash. Neurological: Negative for weakness and numbness.        Past Medical History:   Diagnosis Date    Acid reflux     Angina at rest [facility]) In Past    Denies Chest Pain At This Time    Arthritis     \"Knees, Back And Hips\"    Asthma     Broken teeth     Upper    CAD (coronary artery disease)     Sees Dr. Stephanie Lemons a couple of heart stents- had stress test in May 2018 all ok\"    Cancer [facility])     skin cancer on face    Chronic back pain     Degenerative joint disease     Back     Diabetes mellitus ([facility] [place] [address] Dx 2003    GI bleed 11/2009    H/O Doppler venous ultrasound 02/11/2019    No significant insufficiency noted.  H/O echocardiogram 07/14/2014    EF 50%, low normal systolic function, no wall motion abnormalities, mild concentric hypertrophy and signs of diastolic dysfunction, mildly dilated left atrium,  no pericardial effusion, mild mitral and tricuspid regurg.  H/O echocardiogram 11/2219    EF 55-60%,  There is physiological to trace amount of pericardial fluid present.  H/O percutaneous transluminal coronary angioplasty 04/30/2019    LAD stent and PIC of restent stenosis also in LAD.  Hiatal hernia     History of complete ECG     07/20/2012=NSR, leftward axis, prob normal for age, poor r-waveprogression, inferior ST-T abnormalities, consider ischemia    History of echocardiogram 01/26/2017    Ejection fraction is visually estimated at 55%. Mild concentric left ventricular hypertrophy. Mildly dilated left atrium. Mild mitral regurgitation is present.  History of nuclear stress test 01/26/2017    cardiolite-moderate ischemia mid anteroseptal,EF70%    History of nuclear stress test 07/12/2018    Normal    Hx of 24 hour EKG monitoring 12/21/2011    brief run of SVT, otherwise noth clinically sig. arrhythmia noted    Hx of cardiovascular stress test 7/25/14 1/23/12 7/14 EF70% normal study 1/23/12=thallium stress=no scintigraphic inducible myocardial ischemia, EF 70%;  03/2011=EF 70%; 03/2010=EF 70%; 12/1997; 02/1997    Hx of cardiovascular stress test 05/04/2020    Normal stress test    Hx of CT scan     05/08/2011=CT chest with contrast=examination is moderately degraded by respiratory motion. No evidence for acute pulmonary thromboembolic disease.  Dilated main pulmonary artery suggesting elevated pulm artery pressures    Hx of Doppler ultrasound     bilateral carotid 03/11/2011= no significant stenosis present    Hx of echocardiogram     03/11/2011=mildly hypertreophied left ventricle, EF 60%, mild pulm. htn, mild aortic stenosis; 02/1997   [facility] OTHER MEDICAL     Primary Care Physician Is Dr. Jessica Adam Hyperlipidemia     Hypertension     Hyperthyroidism     Hypothyroidism, adult     Kidney stones Last Episode In 2000's    \"Passed Kidney Stones Three Different Times\"    Left hip pain     \"for months- had hip pain\"for steroid injection 7/5/2016    Migraines     Normal cardiac stress test 05/12/2016    EF70% Normal    ORLIN (obstructive sleep apnea)     doesnt use cpap, uses o2 2l as needed at night    Post PTCA     2008, 2010, 2016    RECEIVED BLOOD TRANSFUSION 11/2009    RECEIVED BLOOD TRANSFUSION, NO REACTION TO THE BLOOD TRANSFUSION RECEIVED    Shortness of breath on exertion     Sleep apnea     NO CPAP- last sleep study done 2011    Teeth missing     Upper And Lower    Ulcer 11/2009    Stomach, GI Bleeding, Received Blood Transfusions    Urinary incontinence     \"wear a pad all the time\"    UTI (urinary tract infection) In Past     No Current Symptoms    Venous US Dup     Wears glasses     Wears partial dentures     Upper       Objective:   Physical Exam  Constitutional:       Appearance: She is well-developed. HENT:      Head: Normocephalic. Eyes:      Pupils: Pupils are equal, round, and reactive to light. Pulmonary:      Effort: Pulmonary effort is normal.   Musculoskeletal:         General: Swelling present. No deformity. Normal range of motion. Cervical back: Normal range of motion. Right hip: No deformity, lacerations, tenderness, bony tenderness or crepitus. Normal range of motion. Normal strength. Left hip: Tenderness present. No deformity, lacerations, bony tenderness or crepitus. Normal range of motion. Normal strength. Right knee: Normal.      Left knee: Normal.   Skin:     General: Skin is warm and dry. Capillary Refill: Capillary refill takes less than 2 seconds. Coloration: Skin is not pale.       Findings: No erythema or rash. Neurological:      Mental Status: She is alert and oriented to person, place, and time. Left hip-Incision clean, dry, intact, with no erythema. No fluctuance, there is clear, yellow drainage from a recurrence at the location of the prior I&D of the left hip. No groin pain with range of motion of the left hip. XR HIP 1 VW W PELVIS LEFT    Result Date: 6/20/2022  XRAY X-ray 2 views of the left hip and AP pelvis obtained and reviewed by me today in the office demonstrates age appropriate bone density throughout with well-positioned left total hip arthroplasty, there has been no change in position of components compared to prior x-rays, no signs of loosening or wear, there has been the development of some heterotopic ossification around the tip of the greater trochanter, no acute osseous abnormalities. Impression: Stable left total hip arthroplasty           Assessment:       Left hip I&D 8 weeks  Left HANANE, 8 months        Plan:       I discussed with her today her x-ray findings. I explained to her that her implants are stable and remain in good alignment with no signs of loosening. However at this point given her recurrence of her significant drainage there is a possibility that there is an indolent infection around the implant. I will get her a referral to Dr. Ross Osei for second opinion for possible I&D and radical debridement if necessary. I explained to her that this is not a type of procedure that I performed. Continue weight-bearing as tolerated. Continue range of motion exercises as instructed. Ice and elevate as needed. Tylenol or Motrin for pain. Follow up as needed.               Chace Concepcion, DO

## 2022-06-20 NOTE — PROGRESS NOTES
Patient returns to the office with a left hip I&D DOS: 4/13/22. Pt stated she has no pain today and if she does it is very minimal. Pt did notice the drainage resumed like before and has slowly been progressing worse. Pt stated the drainage is coming from the same area as before and is a green/clear fluid. Pt has noticed some cold sweats on occasion but has been feeling fine the past couple days. Pt stated she has been dressing and cleaning the wound daily.

## 2022-06-20 NOTE — PATIENT INSTRUCTIONS
Continue weight-bearing as tolerated. Continue range of motion exercises as instructed. Ice and elevate as needed. Tylenol or Motrin for pain.   Referral to Dr. Sheila Dasilva  [address] [facility], [facility] [address]  [phone]

## 2022-06-22 ENCOUNTER — TELEPHONE (OUTPATIENT)
Dept: ORTHOPEDIC SURGERY | Age: 72
End: 2022-06-22

## 2022-06-22 DIAGNOSIS — Z98.890 STATUS POST INCISION AND DRAINAGE: ICD-10-CM

## 2022-06-22 DIAGNOSIS — M16.12 PRIMARY OSTEOARTHRITIS OF LEFT HIP: ICD-10-CM

## 2022-06-22 DIAGNOSIS — Z96.642 STATUS POST LEFT HIP REPLACEMENT: Primary | ICD-10-CM

## 2022-06-22 NOTE — TELEPHONE ENCOUNTER
Patient is no longer wanting Dr. Rishabh Jones. Patient is wanting her appointment with Dr. Jose Eduardo Carl. Patient states that they are now accepting her insurance and she would like the referral sent over there for a second opinion.     Please advise if Dr. Rudy Collet is okay for pt to be referred to

## 2022-06-22 NOTE — TELEPHONE ENCOUNTER
Patient called in stating that she is not willing to make the trip to [facility] for Dr. Cooper Gomez. Patient was recommended Dr. Yanelis Brand who is out of [facility] and was asking if he would be appropriate given her situation.  Please advise

## 2022-07-07 ENCOUNTER — HOSPITAL ENCOUNTER (OUTPATIENT)
Dept: MRI IMAGING | Age: 72
Discharge: HOME | End: 2022-07-07
Payer: MEDICARE

## 2022-07-07 ENCOUNTER — HOSPITAL ENCOUNTER (OUTPATIENT)
Dept: NUCLEAR MEDICINE | Age: 72
Discharge: HOME | End: 2022-07-07
Payer: MEDICARE

## 2022-07-07 DIAGNOSIS — T84.59XA INFECTION OF PROSTHETIC KNEE JOINT, INITIAL ENCOUNTER (HCC): ICD-10-CM

## 2022-07-07 DIAGNOSIS — Z96.659 INFECTION OF PROSTHETIC KNEE JOINT, INITIAL ENCOUNTER (HCC): ICD-10-CM

## 2022-07-07 DIAGNOSIS — T84.84XA PAIN DUE TO HIP JOINT PROSTHESIS, UNSPECIFIED LATERALITY, INITIAL ENCOUNTER (HCC): ICD-10-CM

## 2022-07-07 DIAGNOSIS — Z96.649 PAIN DUE TO HIP JOINT PROSTHESIS, UNSPECIFIED LATERALITY, INITIAL ENCOUNTER (HCC): ICD-10-CM

## 2022-07-07 PROCEDURE — 3430000000 HC RX DIAGNOSTIC RADIOPHARMACEUTICAL: Performed by: ORTHOPAEDIC SURGERY

## 2022-07-07 PROCEDURE — 73721 MRI JNT OF LWR EXTRE W/O DYE: CPT

## 2022-07-07 PROCEDURE — A9503 TC99M MEDRONATE: HCPCS | Performed by: ORTHOPAEDIC SURGERY

## 2022-07-07 PROCEDURE — 78315 BONE IMAGING 3 PHASE: CPT

## 2022-07-07 RX ORDER — TC 99M MEDRONATE 20 MG/10ML
28.2 INJECTION, POWDER, LYOPHILIZED, FOR SOLUTION INTRAVENOUS
Status: COMPLETED | OUTPATIENT
Start: 2022-07-07 | End: 2022-07-07

## 2022-07-07 RX ADMIN — TC 99M MEDRONATE 28.2 MILLICURIE: 20 INJECTION, POWDER, LYOPHILIZED, FOR SOLUTION INTRAVENOUS at 08:36

## 2022-07-13 ENCOUNTER — TELEPHONE (OUTPATIENT)
Dept: CARDIOLOGY CLINIC | Age: 72
End: 2022-07-13

## 2022-07-13 NOTE — TELEPHONE ENCOUNTER
Revised Cardiac Risk Index:   High risk type of surgery no   H/O ischemic heart disease  (h/o MI, or a positive stress test, current complaint of chest pain considered to be secondary to myocardial ischemia,  Use of nitrate therapy or ECG with pathological Q waves; do not count prior coronary revascularization procedure unless one of the other criteria for ischemic heart disease is present) yes     H/O heart failure no  H/O CVA no   H/O DM treated with insulin no  Preoperative serum creatinine >2.0 mg/dl (177 micromol/L) no     The calculated rate of cardiac death, nonfatal myocardial infarction, and nonfatal cardiac arrest according to the number of predictors is; One risk factor 1.0% ( 95% CI: 0.5-1.4)     she is considered a Low/moderate risk for surgery. Antiplatelet therapy can be held prior to surgery at the discretion of the surgeon. To be resumed as soon as possible if held. Electronically signed by Estefani Salvador.  YOBANI Beverly CNP on 7/13/2022 at 4:14 PM

## 2022-07-13 NOTE — TELEPHONE ENCOUNTER
Cardiologist: Dr. Sadie Stratton  Surgeon: Dr. Saskia Watters  Surgery: Left hip I&D  Anesthesia: General  Date: 7/18/2022  FAX# [phone]  # [phone]    Last OV 1/21/2022 w/Eva      1. Chest pain: stress test was normal in May [address], she had  PCI of LAD and PTCA of instent stent restenosis in 4/19, Continue aspirin, BB, ace inhibitor and statis, ranexa,echo is normal, she uses oxygen at home as she has possible COPD, PFT is done also  2. She had a rough time in [facility], her son was in [place] and her daughter had covid 23  3. Leg swelling:venous doppler is normal, recommend compression socks  4. DM: stable continue metformin    5. Dyslipidemia: continue statins  6. HTN: stable, continue coreg and lisinopril      Last EKG- 9/29/2021      NM-5/4/2021   Normal LV function. normal EDV    There is normal isotope uptake following exercise and at rest. There is no    evidence of exercise induced ischemia.    This is a normal study.           Echo- 11/22/2019   LV function and size are normal, Ejection Fraction 55-60 %. Normal left ventricular wall thickness. No regional wall motion abnormalities were detected. Diastolic Dysfunction Grade I .   Mildly dilated left atrium. No significant valvular abnormalities. RVSP= 24 mmHg. There is physiological to trace amount of pericardial fluid present.       Cath- 4/30/2019   severe mid LAD stenosis and severe 2nd lesion at the distal mid   LAD restenosis, s/p successful PCI with OSWALD and successful PTCA   of instent restenosis with NC balloon        Plavix    Aspirin

## 2022-07-15 ENCOUNTER — OFFICE VISIT (OUTPATIENT)
Dept: CARDIOLOGY CLINIC | Age: 72
End: 2022-07-15
Payer: MEDICARE

## 2022-07-15 VITALS
SYSTOLIC BLOOD PRESSURE: 112 MMHG | DIASTOLIC BLOOD PRESSURE: 70 MMHG | HEART RATE: 81 BPM | HEIGHT: 62 IN | BODY MASS INDEX: 41.04 KG/M2 | WEIGHT: 223 LBS

## 2022-07-15 DIAGNOSIS — I25.10 CORONARY ARTERY DISEASE INVOLVING NATIVE CORONARY ARTERY OF NATIVE HEART WITHOUT ANGINA PECTORIS: Primary | ICD-10-CM

## 2022-07-15 PROCEDURE — G8399 PT W/DXA RESULTS DOCUMENT: HCPCS | Performed by: INTERNAL MEDICINE

## 2022-07-15 PROCEDURE — G8427 DOCREV CUR MEDS BY ELIG CLIN: HCPCS | Performed by: INTERNAL MEDICINE

## 2022-07-15 PROCEDURE — 1036F TOBACCO NON-USER: CPT | Performed by: INTERNAL MEDICINE

## 2022-07-15 PROCEDURE — 99214 OFFICE O/P EST MOD 30 MIN: CPT | Performed by: INTERNAL MEDICINE

## 2022-07-15 PROCEDURE — 3017F COLORECTAL CA SCREEN DOC REV: CPT | Performed by: INTERNAL MEDICINE

## 2022-07-15 PROCEDURE — 1124F ACP DISCUSS-NO DSCNMKR DOCD: CPT | Performed by: INTERNAL MEDICINE

## 2022-07-15 PROCEDURE — G8417 CALC BMI ABV UP PARAM F/U: HCPCS | Performed by: INTERNAL MEDICINE

## 2022-07-15 PROCEDURE — 1090F PRES/ABSN URINE INCON ASSESS: CPT | Performed by: INTERNAL MEDICINE

## 2022-07-15 PROCEDURE — 93000 ELECTROCARDIOGRAM COMPLETE: CPT | Performed by: INTERNAL MEDICINE

## 2022-07-15 NOTE — PROGRESS NOTES
Verónica Ag MD        OFFICE  FOLLOWUP NOTE    Chief complaints: patient is here for management of of CAD, HTN, DYSLPIDEMIA, DM,leg swelling, sleep apnea    Subjective: patient feels left hip wound not healing, no chest pain, no shortness of breath, no dizziness, no palpitations    HPI Brandon Melendez is a 66 y. o.year old who  has a past medical history of Acid reflux, Angina at rest [facility]), Arthritis, Asthma, Broken teeth, CAD (coronary artery disease), Cancer ([facility] [address] Chronic back pain, Degenerative joint disease, Diabetes mellitus ([facility] [address] GI bleed, H/O Doppler venous ultrasound, H/O echocardiogram, H/O echocardiogram, H/O percutaneous transluminal coronary angioplasty, Hiatal hernia, History of complete ECG, History of echocardiogram, History of nuclear stress test, History of nuclear stress test, Hx of 24 hour EKG monitoring, Hx of cardiovascular stress test, Hx of cardiovascular stress test, Hx of CT scan, Hx of Doppler ultrasound, Hx of echocardiogram, HX OTHER MEDICAL, Hyperlipidemia, Hypertension, Hyperthyroidism, Hypothyroidism, adult, Kidney stones, Left hip pain, Migraines, Normal cardiac stress test, ORLIN (obstructive sleep apnea), Post PTCA, RECEIVED BLOOD TRANSFUSION, Shortness of breath on exertion, Sleep apnea, Teeth missing, Ulcer, Urinary incontinence, UTI (urinary tract infection), Venous US Dup, Wears glasses, and Wears partial dentures. and presents for management of of CAD, HTN, DYSLPIDEMIA, DM,leg swelling, sleep apnea, which are well controlled      Current Outpatient Medications   Medication Sig Dispense Refill    gabapentin (NEURONTIN) 300 MG capsule Take 300 mg by mouth 2 times daily.       turmeric 500 MG CAPS Take 500 mg by mouth daily      albuterol sulfate  (90 Base) MCG/ACT inhaler INHALE 2 PUFFS INTO THE LUNGS EVERY 6 HOURS AS NEEDED FOR WHEEZING 76.5 g 3    Lancets (ONETOUCH DELICA PLUS [id number]) MISC USE AS DIRECTED TWICE DAILY      ONETOUCH ULTRA strip TEST AS DIRECTED TWICE DAILY      famotidine (PEPCID) 20 MG tablet       Spacer/Aero-Holding Chambers HEATHER 1 Device by Does not apply route daily as needed (use with albuterol rescue inhaler) 1 Device 0    Ferrous Sulfate (IRON) 28 MG TABS Take by mouth daily       ranolazine (RANEXA) 500 MG extended release tablet Take 1 tablet by mouth 2 times daily 60 tablet 5    pantoprazole (PROTONIX) 40 MG tablet Take 40 mg by mouth daily      metFORMIN (GLUCOPHAGE) 500 MG tablet TK 2 T PO QAM AND 3 T PO QPM WITH MEALS 60 tablet 11    clopidogrel (PLAVIX) 75 MG tablet Take 1 tablet by mouth daily 30 tablet 3    OXYGEN Inhale into the lungs      quinapril (ACCUPRIL) 40 MG tablet Take 1 tablet by mouth nightly (Patient taking differently: Take 40 mg by mouth in the morning and 40 mg before bedtime. 1 Tab morning, 1/2 tab night.) 90 tablet 3    carvedilol (COREG) 12.5 MG tablet Take 1 tablet by mouth 2 times daily (with meals) (Patient taking differently: Take 6.25 mg by mouth in the morning and 6.25 mg in the evening. Take with meals.) 1180 tablet 3    atorvastatin (LIPITOR) 20 MG tablet Take 1 tablet by mouth nightly 90 tablet 3    hydrochlorothiazide (HYDRODIURIL) 25 MG tablet Take 1 tablet by mouth daily Takes 12.5 daily 1/2 of a 25 mg tablet 90 tablet 3    B Complex Vitamins (VITAMIN B COMPLEX PO) Take by mouth every morning       vitamin D (CHOLECALCIFEROL) 1000 UNIT TABS tablet Take 1,000 Units by mouth daily      aspirin 81 MG tablet Take 81 mg by mouth every morning Last Dose Taken 9-2-14 Due To Scheduled Surgery      glipiZIDE (GLUCOTROL) 5 MG tablet Take 5 mg by mouth 2 times daily (before meals). levothyroxine (SYNTHROID) 150 MCG tablet Take 150 mcg by mouth Daily       SYMBICORT 160-4.5 MCG/ACT AERO INHALE 2 PUFFS INTO THE LUNGS TWICE DAILY (Patient not taking: Reported on 7/15/2022) 1 Inhaler 5     No current facility-administered medications for this visit.      Allergies: Codeine  Past Medical History:   Diagnosis Date    Acid reflux     Angina at rest [facility]) In Past    Denies Chest Pain At This Time    Arthritis     \"Knees, Back And Hips\"    Asthma     Broken teeth     Upper    CAD (coronary artery disease)     Sees Dr. Renuka Caldwell a couple of heart stents- had stress test in May 2018 all ok\"    Cancer [facility])     skin cancer on face    Chronic back pain     Degenerative joint disease     Back     Diabetes mellitus ([address] Dx 2003    GI bleed 11/2009    H/O Doppler venous ultrasound 02/11/2019    No significant insufficiency noted. H/O echocardiogram 07/14/2014    EF 50%, low normal systolic function, no wall motion abnormalities, mild concentric hypertrophy and signs of diastolic dysfunction, mildly dilated left atrium,  no pericardial effusion, mild mitral and tricuspid regurg. H/O echocardiogram 11/2219    EF 55-60%,  There is physiological to trace amount of pericardial fluid present. H/O percutaneous transluminal coronary angioplasty 04/30/2019    LAD stent and PIC of restent stenosis also in LAD. Hiatal hernia     History of complete ECG     07/20/2012=NSR, leftward axis, prob normal for age, poor r-waveprogression, inferior ST-T abnormalities, consider ischemia    History of echocardiogram 01/26/2017    Ejection fraction is visually estimated at 55%. Mild concentric left ventricular hypertrophy. Mildly dilated left atrium. Mild mitral regurgitation is present. History of nuclear stress test 01/26/2017    cardiolite-moderate ischemia mid anteroseptal,EF70%    History of nuclear stress test 07/12/2018    Normal    Hx of 24 hour EKG monitoring 12/21/2011    brief run of SVT, otherwise noth clinically sig.  arrhythmia noted    Hx of cardiovascular stress test 7/25/14 1/23/12 7/14 EF70% normal study 1/23/12=thallium stress=no scintigraphic inducible myocardial ischemia, EF 70%;  03/2011=EF 70%; 03/2010=EF 70%; 12/1997; 02/1997    Hx of cardiovascular stress test 05/04/2020    Normal stress test    Hx of CT scan 2011=CT chest with contrast=examination is moderately degraded by respiratory motion. No evidence for acute pulmonary thromboembolic disease.  Dilated main pulmonary artery suggesting elevated pulm artery pressures    Hx of Doppler ultrasound     bilateral carotid 2011= no significant stenosis present    Hx of echocardiogram     2011=mildly hypertreophied left ventricle, EF 60%, mild pulm. htn, mild aortic stenosis; 1997    HX OTHER MEDICAL     Primary Care Physician Is Dr. Flori Shine    Hyperlipidemia     Hypertension     Hyperthyroidism     Hypothyroidism, adult     Kidney stones Last Episode In     \"Passed Kidney Stones Three Different Times\"    Left hip pain     \"for months- had hip pain\"for steroid injection 2016    Migraines     Normal cardiac stress test 2016    EF70% Normal    ORLIN (obstructive sleep apnea)     doesnt use cpap, uses o2 2l as needed at night    Post PTCA     , ,     RECEIVED BLOOD TRANSFUSION 2009    RECEIVED BLOOD TRANSFUSION, NO REACTION TO THE BLOOD TRANSFUSION RECEIVED    Shortness of breath on exertion     Sleep apnea     NO CPAP- last sleep study done     Teeth missing     Upper And Lower    Ulcer 2009    Stomach, GI Bleeding, Received Blood Transfusions    Urinary incontinence     \"wear a pad all the time\"    UTI (urinary tract infection) In Past     No Current Symptoms    Venous US Dup     Wears glasses     Wears partial dentures     Upper     Past Surgical History:   Procedure Laterality Date    CARPAL TUNNEL RELEASE Bilateral  Right      Left     [facility]    Tubal Ligation Also Done In  With     [address] [place] [address] In     COLONOSCOPY  3/23/15    Internal hemorrhoids    COLONOSCOPY  04/10/2019    COLONOSCOPY N/A 4/10/2019    COLONOSCOPY POLYPECTOMY SNARE/COLD BIOPSY performed by Aminah Palomo MD at [facility]  2010 balloon angioplasty of distal mid LAD;     CORONARY ANGIOPLASTY  2008    LAD stent 2.75x16 taxus    CORONARY ANGIOPLASTY  2008    2. 5x8 taxus stent to the ostium of the circ. DENTAL SURGERY      Teeth Extracted In Past    DIAGNOSTIC CARDIAC CATH LAB PROCEDURE  2010    balloon angioplasty of distal mid LAD;     DIAGNOSTIC CARDIAC CATH LAB PROCEDURE  2008    LAD stent 2.75x16 taxus    DIAGNOSTIC CARDIAC CATH LAB PROCEDURE  2008    2. 5x8 taxus stent to the ostium of the circ. ENDOSCOPY, COLON, DIAGNOSTIC  \"Several\"    ENDOSCOPY, COLON, DIAGNOSTIC  3/23/15    small hiatal hernia, anatomy consistent with banding, gastritis    FINGER TRIGGER RELEASE  10-09 \"One On Each Hand Trigger Finger Release\"    - \"Left Middle Finger Trigger Finger Release\"    HIP SURGERY Left 2022    LEFT HIP INCISION AND DRAINAGE performed by Kamini Antonio DO at [facility] ([facility])      Partial Abdominal Hysterectomy, \"Pinned The Bladder Up\"    JOINT REPLACEMENT Right     Total Right Hip    JOINT REPLACEMENT Right     Total Right Knee    JOINT REPLACEMENT Left     Total Left Knee    JOINT REPLACEMENT Left     Revision Total Left Knee    KNEE SURGERY Right ,     KNEE SURGERY Left     Left Knee Bone Graft    KNEE SURGERY Right     Right Knee Bone Graft    OTHER SURGICAL HISTORY      \"Stomach Stapling\" Pre Surgery Weight Was 230 lbs    OTHER SURGICAL HISTORY Left 04/10/2017    left hip steroid injection    PTCA  16    Stenting of the LAD.     SHOULDER ARTHROSCOPY Right 2-10    SHOULDER ARTHROSCOPY Left 09/10/14    TONSILLECTOMY AND ADENOIDECTOMY  1964    TOTAL HIP ARTHROPLASTY Left 10/12/2021    LEFT HIP TOTAL ARTHROPLASTY POSTERIOR performed by Kamini Antonio DO at [facility] With     WISDOM TOOTH EXTRACTION      All [place] Teeth Extracted In Past     Family History   Problem Relation Age of Onset    Cancer Mother         Liver Cancer    Heart Disease Mother     High Blood Pressure Mother     Asthma Mother     Cancer Father         Colon Cancer    Heart Disease Father     High Blood Pressure Father     Colon Cancer Father     Arthritis Sister     Early Death Sister 52    High Blood Pressure Sister     Other Sister         \"Breathing Problem\"    Heart Disease Son         Open Heart Surgery    Diabetes Son     High Blood Pressure Son     Mental Illness Daughter         Bipolar    Early Death Sister 61        Liver Cancer    Cancer Sister         Liver Cancer    Stomach Cancer Neg Hx      Social History     Tobacco Use    Smoking status: Former     Packs/day: 0.25     Years: 38.00     Pack years: 9.50     Types: Cigarettes     Start date: 1962     Quit date: 2000     Years since quittin.8    Smokeless tobacco: Never    Tobacco comments:     \"never a heavy smoker just occ smoker\"   Substance Use Topics    Alcohol use: No      [unfilled]  Review of Systems:   Constitutional: No Fever or Weight Loss   Eyes: No Decreased Vision  ENT: No Headaches, Hearing Loss or Vertigo  Cardiovascular: No chest pain, dyspnea on exertion, palpitations or loss of consciousness  Respiratory: No cough or wheezing    Gastrointestinal: No abdominal pain, appetite loss, blood in stools, constipation, diarrhea or heartburn  Genitourinary: No dysuria, trouble voiding, or hematuria  Musculoskeletal:  No gait disturbance, weakness or joint complaints  Integumentary: No rash or pruritis  Neurological: No TIA or stroke symptoms  Psychiatric: No anxiety or depression  Endocrine: No malaise, fatigue or temperature intolerance  Hematologic/Lymphatic: No bleeding problems, blood clots or swollen lymph nodes  Allergic/Immunologic: No nasal congestion or hives  All systems negative except as marked.    Objective:  /70 (Site: Left Upper Arm)   Pulse 81   Ht 5' 2\" (1.575 m)   Wt 223 lb (101.2 kg)   BMI 40.79 kg/m²   Wt Readings from Last 3 Encounters:   07/15/22 223 lb (101.2 kg)   06/20/22 218 lb (98.9 kg)   05/09/22 218 lb (98.9 kg)     Body mass index is 40.79 kg/m². GENERAL - Alert, oriented, pleasant, in no apparent distress,normal grooming  HEENT - pupils are intact, cornea intact, conjunctive normal color, ears are normal in exam,  Neck - Supple. No jugular venous distention noted. No carotid bruits, no apical -carotid delay  Respiratory:  Normal breath sounds, No respiratory distress, No wheezing, No chest tenderness. ,no use of accessory muscles, diaphragm movement is normal  Cardiovascular: (PMI) apex non displaced,no lifts no thrills, no s3,no s4, Normal heart rate, Normal rhythm, No murmurs, No rubs, No gallops. Carotid arteries pulse and amplitude are normal no bruit, no abdominal bruit noted ( normal abdominal aorta ausculation),   Extremities - No cyanosis, clubbing, or significant edema, no varicose veins    Abdomen - No masses, tenderness, or organomegaly, no hepato-splenomegally, no bruits  Musculoskeletal - No significant edema, no kyphosis or scoliosis, no deformity in any extremity noted, muscle strength and tone are normal  Skin: no ulcer,no scar,no stasis dermatitis   Neurologic - alert oriented times 3,Cranial nerves II through XII are grossly intact. There were no gross focal neurologic abnormalities.    Psychiatric: normal mood and affect    Lab Results   Component Value Date/Time    CKTOTAL 164 07/05/2014 11:45 AM    CKMB 1.4 12/20/2011 09:44 PM    TROPONINI <0.006 01/23/2014 06:01 PM    TROPONINI <0.006 12/21/2011 01:32 AM     BNP:    Lab Results   Component Value Date/Time    BNP 33 01/23/2014 06:00 PM     PT/INR:  No results found for: PTINR  Lab Results   Component Value Date    LABA1C 7.3 (H) 06/16/2017    LABA1C 7.4 (H) 03/08/2017     Lab Results   Component Value Date    CHOL 129 10/16/2019    TRIG 191 10/16/2019    HDL 49 10/16/2019    LDLCALC 42 10/16/2019    LDLDIRECT 53 06/16/2017     Lab Results   Component Value Date    ALT 10 10/29/2021    AST 12 (L) 10/29/2021     TSH:  No results found for: TSH      Ekg: nsr    Impression:  Keyla Dewitt is a 66 y. o.year old who  has a past medical history of Acid reflux, Angina at rest [facility]), Arthritis, Asthma, Broken teeth, CAD (coronary artery disease), Cancer ([facility] [address] Chronic back pain, Degenerative joint disease, Diabetes mellitus ([facility] GI bleed, H/O Doppler venous ultrasound, H/O echocardiogram, H/O echocardiogram, H/O percutaneous transluminal coronary angioplasty, Hiatal hernia, History of complete ECG, History of echocardiogram, History of nuclear stress test, History of nuclear stress test, Hx of 24 hour EKG monitoring, Hx of cardiovascular stress test, Hx of cardiovascular stress test, Hx of CT scan, Hx of Doppler ultrasound, Hx of echocardiogram, HX OTHER MEDICAL, Hyperlipidemia, Hypertension, Hyperthyroidism, Hypothyroidism, adult, Kidney stones, Left hip pain, Migraines, Normal cardiac stress test, ORLIN (obstructive sleep apnea), Post PTCA, RECEIVED BLOOD TRANSFUSION, Shortness of breath on exertion, Sleep apnea, Teeth missing, Ulcer, Urinary incontinence, UTI (urinary tract infection), Venous US Dup, Wears glasses, and Wears partial dentures.  and presents with     Plan:  Left hip sx and drainage, she had wound vac in past and 6 weeks ago, its draining again, she may need sx for wound evacuation,  ok to hold aspirin and plavix for now  Chest pain: EKG repeated today, its normal,stress test was normal in May [address], she had  PCI of LAD and PTCA of instent stent restenosis in 4/19, on aspirin, plavix as above,BB, ace inhibitor and statis, ranexa,echo is normal, she uses oxygen at home as she has possible COPD, PFT is done also  She had a rough time in [facility], her son was in [place] and her daughter had covid 19  Leg swelling:venous doppler is normal, recommend compression socks  DM: stable continue metformin    Dyslipidemia: continue statins  HTN: stable, continue coreg and lisinopril  All labs, medications and tests reviewed, continue all other medications of all above medical condition listed as is.

## 2022-11-02 ENCOUNTER — HOSPITAL ENCOUNTER (OUTPATIENT)
Dept: NUCLEAR MEDICINE | Age: 72
Discharge: HOME | End: 2022-11-02
Payer: MEDICARE

## 2022-11-02 ENCOUNTER — HOSPITAL ENCOUNTER (OUTPATIENT)
Dept: NUCLEAR MEDICINE | Age: 72
End: 2022-11-02
Payer: MEDICARE

## 2022-11-02 DIAGNOSIS — T84.59XA INFECTION OF PROSTHETIC HIP JOINT, INITIAL ENCOUNTER (HCC): ICD-10-CM

## 2022-11-02 DIAGNOSIS — T84.59XA INFECTION AND INFLAMMATORY REACTION DUE TO OTHER INTERNAL JOINT PROSTHESIS, INITIAL ENCOUNTER (HCC): ICD-10-CM

## 2022-11-02 DIAGNOSIS — T84.84XA PAIN IN PROSTHETIC JOINT, INITIAL ENCOUNTER (HCC): ICD-10-CM

## 2022-11-02 DIAGNOSIS — Z96.649 INFECTION OF PROSTHETIC HIP JOINT, INITIAL ENCOUNTER (HCC): ICD-10-CM

## 2022-11-02 PROCEDURE — 78800 RP LOCLZJ TUM 1 AREA 1 D IMG: CPT

## 2022-11-02 PROCEDURE — 78102 BONE MARROW IMAGING LTD: CPT

## 2022-11-02 PROCEDURE — A9541 TC99M SULFUR COLLOID: HCPCS | Performed by: ORTHOPAEDIC SURGERY

## 2022-11-02 PROCEDURE — 3430000000 HC RX DIAGNOSTIC RADIOPHARMACEUTICAL: Performed by: ORTHOPAEDIC SURGERY

## 2022-11-02 RX ADMIN — Medication 15 MILLICURIE: at 10:28

## 2022-11-02 RX ADMIN — Medication 441 MICRO CURIE: at 12:40

## 2022-11-03 ENCOUNTER — HOSPITAL ENCOUNTER (OUTPATIENT)
Dept: NUCLEAR MEDICINE | Age: 72
Discharge: HOME | End: 2022-11-03
Payer: MEDICARE

## 2023-01-20 ENCOUNTER — OFFICE VISIT (OUTPATIENT)
Dept: CARDIOLOGY CLINIC | Age: 73
End: 2023-01-20
Payer: MEDICARE

## 2023-01-20 VITALS
WEIGHT: 221 LBS | BODY MASS INDEX: 43.39 KG/M2 | SYSTOLIC BLOOD PRESSURE: 130 MMHG | DIASTOLIC BLOOD PRESSURE: 70 MMHG | HEART RATE: 81 BPM | HEIGHT: 60 IN

## 2023-01-20 DIAGNOSIS — I25.10 CORONARY ARTERY DISEASE INVOLVING NATIVE CORONARY ARTERY OF NATIVE HEART WITHOUT ANGINA PECTORIS: Primary | ICD-10-CM

## 2023-01-20 PROCEDURE — 99214 OFFICE O/P EST MOD 30 MIN: CPT | Performed by: INTERNAL MEDICINE

## 2023-01-20 PROCEDURE — 3075F SYST BP GE 130 - 139MM HG: CPT | Performed by: INTERNAL MEDICINE

## 2023-01-20 PROCEDURE — 1124F ACP DISCUSS-NO DSCNMKR DOCD: CPT | Performed by: INTERNAL MEDICINE

## 2023-01-20 PROCEDURE — 3078F DIAST BP <80 MM HG: CPT | Performed by: INTERNAL MEDICINE

## 2023-01-20 PROCEDURE — 93000 ELECTROCARDIOGRAM COMPLETE: CPT | Performed by: INTERNAL MEDICINE

## 2023-01-20 NOTE — PROGRESS NOTES
Zina Traylor MD        OFFICE  FOLLOWUP NOTE    Chief complaints: patient is here for management of CAD, HTN, DYSLPIDEMIA, DM,leg swelling, sleep apnea    Preop of left hip    Subjective: patient feels better, some times chest pain when she lays down, no shortness of breath, no dizziness, no palpitations    HPI Adela Conner is a 74 y. o.year old who  has a past medical history of Acid reflux, Angina at rest [facility]), Arthritis, Asthma, Broken teeth, CAD (coronary artery disease), Cancer ([facility] [address] Chronic back pain, Degenerative joint disease, Diabetes mellitus ([facility] [address] GI bleed, H/O Doppler venous ultrasound, H/O echocardiogram, H/O echocardiogram, H/O percutaneous transluminal coronary angioplasty, Hiatal hernia, History of complete ECG, History of echocardiogram, History of nuclear stress test, History of nuclear stress test, Hx of 24 hour EKG monitoring, Hx of cardiovascular stress test, Hx of cardiovascular stress test, Hx of CT scan, Hx of Doppler ultrasound, Hx of echocardiogram, HX OTHER MEDICAL, Hyperlipidemia, Hypertension, Hyperthyroidism, Hypothyroidism, adult, Kidney stones, Left hip pain, Migraines, Normal cardiac stress test, ORLIN (obstructive sleep apnea), Post PTCA, RECEIVED BLOOD TRANSFUSION, Shortness of breath on exertion, Sleep apnea, Teeth missing, Ulcer, Urinary incontinence, UTI (urinary tract infection), Venous US Dup, Wears glasses, and Wears partial dentures.  and presents for management of CAD, HTN, DYSLPIDEMIA, DM,leg swelling, sleep apnea which are well controlled      Current Outpatient Medications   Medication Sig Dispense Refill    turmeric 500 MG CAPS Take 500 mg by mouth daily      Lancets (ONETOUCH DELICA PLUS [id number]) MISC USE AS DIRECTED TWICE DAILY      ONETOUCH ULTRA strip TEST AS DIRECTED TWICE DAILY      Spacer/Aero-Holding Chambers HEATHER 1 Device by Does not apply route daily as needed (use with albuterol rescue inhaler) 1 Device 0    ranolazine (RANEXA) 500 MG extended release tablet Take 1 tablet by mouth 2 times daily 60 tablet 5    pantoprazole (PROTONIX) 40 MG tablet Take 40 mg by mouth daily      metFORMIN (GLUCOPHAGE) 500 MG tablet TK 2 T PO QAM AND 3 T PO QPM WITH MEALS 60 tablet 11    clopidogrel (PLAVIX) 75 MG tablet Take 1 tablet by mouth daily 30 tablet 3    OXYGEN Inhale into the lungs      quinapril (ACCUPRIL) 40 MG tablet Take 1 tablet by mouth nightly (Patient taking differently: Take 40 mg by mouth 2 times daily 1 Tab morning, 1/2 tab night) 90 tablet 3    carvedilol (COREG) 12.5 MG tablet Take 1 tablet by mouth 2 times daily (with meals) (Patient taking differently: Take 6.25 mg by mouth 2 times daily (with meals)) 1180 tablet 3    atorvastatin (LIPITOR) 20 MG tablet Take 1 tablet by mouth nightly 90 tablet 3    hydrochlorothiazide (HYDRODIURIL) 25 MG tablet Take 1 tablet by mouth daily Takes 12.5 daily 1/2 of a 25 mg tablet 90 tablet 3    B Complex Vitamins (VITAMIN B COMPLEX PO) Take by mouth every morning       vitamin D (CHOLECALCIFEROL) 1000 UNIT TABS tablet Take 1,000 Units by mouth daily      aspirin 81 MG tablet Take 81 mg by mouth every morning Last Dose Taken 9-2-14 Due To Scheduled Surgery      glipiZIDE (GLUCOTROL) 5 MG tablet Take 5 mg by mouth 2 times daily (before meals). levothyroxine (SYNTHROID) 150 MCG tablet Take 150 mcg by mouth Daily       albuterol sulfate  (90 Base) MCG/ACT inhaler INHALE 2 PUFFS INTO THE LUNGS EVERY 6 HOURS AS NEEDED FOR WHEEZING 76.5 g 3    famotidine (PEPCID) 20 MG tablet  (Patient not taking: Reported on 1/20/2023)      SYMBICORT 160-4.5 MCG/ACT AERO INHALE 2 PUFFS INTO THE LUNGS TWICE DAILY (Patient not taking: No sig reported) 1 Inhaler 5    Ferrous Sulfate (IRON) 28 MG TABS Take by mouth daily  (Patient not taking: Reported on 1/20/2023)       No current facility-administered medications for this visit.      Allergies: Codeine  Past Medical History:   Diagnosis Date    Acid reflux     Angina at rest [facility]) In Past    Denies Chest Pain At This Time    Arthritis     \"Knees, Back And Hips\"    Asthma     Broken teeth     Upper    CAD (coronary artery disease)     Sees Dr. Iggy Tanner a couple of heart stents- had stress test in May 2018 all ok\"    Cancer [facility])     skin cancer on face    Chronic back pain     Degenerative joint disease     Back     Diabetes mellitus ([address] Dx 2003    GI bleed 11/2009    H/O Doppler venous ultrasound 02/11/2019    No significant insufficiency noted. H/O echocardiogram 07/14/2014    EF 50%, low normal systolic function, no wall motion abnormalities, mild concentric hypertrophy and signs of diastolic dysfunction, mildly dilated left atrium,  no pericardial effusion, mild mitral and tricuspid regurg. H/O echocardiogram 11/2219    EF 55-60%,  There is physiological to trace amount of pericardial fluid present. H/O percutaneous transluminal coronary angioplasty 04/30/2019    LAD stent and PIC of restent stenosis also in LAD. Hiatal hernia     History of complete ECG     07/20/2012=NSR, leftward axis, prob normal for age, poor r-waveprogression, inferior ST-T abnormalities, consider ischemia    History of echocardiogram 01/26/2017    Ejection fraction is visually estimated at 55%. Mild concentric left ventricular hypertrophy. Mildly dilated left atrium. Mild mitral regurgitation is present. History of nuclear stress test 01/26/2017    cardiolite-moderate ischemia mid anteroseptal,EF70%    History of nuclear stress test 07/12/2018    Normal    Hx of 24 hour EKG monitoring 12/21/2011    brief run of SVT, otherwise noth clinically sig.  arrhythmia noted    Hx of cardiovascular stress test 7/25/14 1/23/12 7/14 EF70% normal study 1/23/12=thallium stress=no scintigraphic inducible myocardial ischemia, EF 70%;  03/2011=EF 70%; 03/2010=EF 70%; 12/1997; 02/1997    Hx of cardiovascular stress test 05/04/2020    Normal stress test    Hx of CT scan     05/08/2011=CT chest with contrast=examination is moderately degraded by respiratory motion. No evidence for acute pulmonary thromboembolic disease. Dilated main pulmonary artery suggesting elevated pulm artery pressures    Hx of Doppler ultrasound     bilateral carotid 2011= no significant stenosis present    Hx of echocardiogram     2011=mildly hypertreophied left ventricle, EF 60%, mild pulm. htn, mild aortic stenosis; 1997    HX OTHER MEDICAL     Primary Care Physician Is Dr. Marcin Paulson    Hyperlipidemia     Hypertension     Hyperthyroidism     Hypothyroidism, adult     Kidney stones Last Episode In     \"Passed Kidney Stones Three Different Times\"    Left hip pain     \"for months- had hip pain\"for steroid injection 2016    Migraines     Normal cardiac stress test 2016    EF70% Normal    ORLIN (obstructive sleep apnea)     doesnt use cpap, uses o2 2l as needed at night    Post PTCA     , ,     RECEIVED BLOOD TRANSFUSION 2009    RECEIVED BLOOD TRANSFUSION, NO REACTION TO THE BLOOD TRANSFUSION RECEIVED    Shortness of breath on exertion     Sleep apnea     NO CPAP- last sleep study done     Teeth missing     Upper And Lower    Ulcer 2009    Stomach, GI Bleeding, Received Blood Transfusions    Urinary incontinence     \"wear a pad all the time\"    UTI (urinary tract infection) In Past     No Current Symptoms    Venous US Dup     Wears glasses     Wears partial dentures     Upper     Past Surgical History:   Procedure Laterality Date    CARPAL TUNNEL RELEASE Bilateral  Right      Left     SECTION  ,     Tubal Ligation Also Done In  With     CHOLECYSTECTOMY      COLONOSCOPY  Last Done In     COLONOSCOPY  3/23/15    Internal hemorrhoids    COLONOSCOPY  04/10/2019    COLONOSCOPY N/A 4/10/2019    COLONOSCOPY POLYPECTOMY SNARE/COLD BIOPSY performed by Parth Bolaños MD at [facility] OR    CORONARY ANGIOPLASTY  2010    balloon angioplasty of distal mid  LAD;     CORONARY ANGIOPLASTY  2008    LAD stent 2.75x16 taxus    CORONARY ANGIOPLASTY  2008    2. 5x8 taxus stent to the ostium of the circ. DENTAL SURGERY      Teeth Extracted In Past    DIAGNOSTIC CARDIAC CATH LAB PROCEDURE  2010    balloon angioplasty of distal mid LAD;     DIAGNOSTIC CARDIAC CATH LAB PROCEDURE  2008    LAD stent 2.75x16 taxus    DIAGNOSTIC CARDIAC CATH LAB PROCEDURE  2008    2. 5x8 taxus stent to the ostium of the circ. ENDOSCOPY, COLON, DIAGNOSTIC  \"Several\"    ENDOSCOPY, COLON, DIAGNOSTIC  3/23/15    small hiatal hernia, anatomy consistent with banding, gastritis    FINGER TRIGGER RELEASE  10-09 \"One On Each Hand Trigger Finger Release\"    - \"Left Middle Finger Trigger Finger Release\"    HIP SURGERY Left 2022    LEFT HIP INCISION AND DRAINAGE performed by Vickie Mijares DO at [facility] ([address] [place] [address])      Partial Abdominal Hysterectomy, \"Pinned The Bladder Up\"    JOINT REPLACEMENT Right     Total Right Hip    JOINT REPLACEMENT Right     Total Right Knee    JOINT REPLACEMENT Left     Total Left Knee    JOINT REPLACEMENT Left     Revision Total Left Knee    KNEE SURGERY Right ,     KNEE SURGERY Left     Left Knee Bone Graft    KNEE SURGERY Right     Right Knee Bone Graft    OTHER SURGICAL HISTORY      \"Stomach Stapling\" Pre Surgery Weight Was 230 lbs    OTHER SURGICAL HISTORY Left 04/10/2017    left hip steroid injection    PTCA  16    Stenting of the LAD.     SHOULDER ARTHROSCOPY Right 2-10    SHOULDER ARTHROSCOPY Left 09/10/14    TONSILLECTOMY AND ADENOIDECTOMY  1964    TOTAL HIP ARTHROPLASTY Left 10/12/2021    LEFT HIP TOTAL ARTHROPLASTY POSTERIOR performed by Vickie Mijares DO at [address] [place] [address] With     WISDOM TOOTH EXTRACTION      All Four Elfrida Teeth Extracted In Past     Family History   Problem Relation Age of Onset    Cancer Mother Liver Cancer    Heart Disease Mother     High Blood Pressure Mother     Asthma Mother     Cancer Father         Colon Cancer    Heart Disease Father     High Blood Pressure Father     Colon Cancer Father     Arthritis Sister     Early Death Sister 52    High Blood Pressure Sister     Other Sister         \"Breathing Problem\"    Heart Disease Son         Open Heart Surgery    Diabetes Son     High Blood Pressure Son     Mental Illness Daughter         Bipolar    Early Death Sister 61        Liver Cancer    Cancer Sister         Liver Cancer    Stomach Cancer Neg Hx      Social History     Tobacco Use    Smoking status: Former     Packs/day: 0.25     Years: 38.00     Pack years: 9.50     Types: Cigarettes     Start date: 1962     Quit date: 2000     Years since quittin.3    Smokeless tobacco: Never    Tobacco comments:     \"never a heavy smoker just occ smoker\"   Substance Use Topics    Alcohol use: No      [unfilled]  Review of Systems:   Constitutional: No Fever or Weight Loss   Eyes: No Decreased Vision  ENT: No Headaches, Hearing Loss or Vertigo  Cardiovascular: some times chest pain, dyspnea on exertion, palpitations or loss of consciousness  Respiratory: No cough or wheezing    Gastrointestinal: No abdominal pain, appetite loss, blood in stools, constipation, diarrhea or heartburn  Genitourinary: No dysuria, trouble voiding, or hematuria  Musculoskeletal:  No gait disturbance, weakness or joint complaints  Integumentary: No rash or pruritis  Neurological: No TIA or stroke symptoms  Psychiatric: No anxiety or depression  Endocrine: No malaise, fatigue or temperature intolerance  Hematologic/Lymphatic: No bleeding problems, blood clots or swollen lymph nodes  Allergic/Immunologic: No nasal congestion or hives  All systems negative except as marked.    Objective:  /70   Pulse 81   Ht 5' (1.524 m)   Wt 221 lb (100.2 kg)   BMI 43.16 kg/m²   Wt Readings from Last 3 Encounters:   23 221 lb (100.2 kg)   07/15/22 223 lb (101.2 kg)   06/20/22 218 lb (98.9 kg)     Body mass index is 43.16 kg/m². GENERAL - Alert, oriented, pleasant, in no apparent distress,normal grooming  HEENT - pupils are intact, cornea intact, conjunctive normal color, ears are normal in exam,  Neck - Supple. No jugular venous distention noted. No carotid bruits, no apical -carotid delay  Respiratory:  Normal breath sounds, No respiratory distress, No wheezing, No chest tenderness. ,no use of accessory muscles, diaphragm movement is normal  Cardiovascular: (PMI) apex non displaced,no lifts no thrills, no s3,no s4, Normal heart rate, Normal rhythm, No murmurs, No rubs, No gallops. Carotid arteries pulse and amplitude are normal no bruit, no abdominal bruit noted ( normal abdominal aorta ausculation),   Extremities - No cyanosis, clubbing, or significant edema, no varicose veins    Abdomen - No masses, tenderness, or organomegaly, no hepato-splenomegally, no bruits  Musculoskeletal - No significant edema, no kyphosis or scoliosis, no deformity in any extremity noted, muscle strength and tone are normal  Skin: no ulcer,no scar,no stasis dermatitis   Neurologic - alert oriented times 3,Cranial nerves II through XII are grossly intact. There were no gross focal neurologic abnormalities.    Psychiatric: normal mood and affect    Lab Results   Component Value Date/Time    CKTOTAL 164 07/05/2014 11:45 AM    CKMB 1.4 12/20/2011 09:44 PM    TROPONINI <0.006 01/23/2014 06:01 PM    TROPONINI <0.006 12/21/2011 01:32 AM     BNP:    Lab Results   Component Value Date/Time    BNP 33 01/23/2014 06:00 PM     PT/INR:  No results found for: PTINR  Lab Results   Component Value Date    LABA1C 7.3 (H) 06/16/2017    LABA1C 7.4 (H) 03/08/2017     Lab Results   Component Value Date    CHOL 129 10/16/2019    TRIG 191 10/16/2019    HDL 49 10/16/2019    LDLCALC 42 10/16/2019    LDLDIRECT 53 06/16/2017     Lab Results   Component Value Date    ALT 10 10/29/2021 AST 12 (L) 10/29/2021     TSH:  No results found for: TSH    Ekg: nsr    Impression:  Vanessa Krishnamurthy is a 74 y. o.year old who  has a past medical history of Acid reflux, Angina at rest [facility]), Arthritis, Asthma, Broken teeth, CAD (coronary artery disease), Cancer ([facility] [address] Chronic back pain, Degenerative joint disease, Diabetes mellitus ([facility] [address] GI bleed, H/O Doppler venous ultrasound, H/O echocardiogram, H/O echocardiogram, H/O percutaneous transluminal coronary angioplasty, Hiatal hernia, History of complete ECG, History of echocardiogram, History of nuclear stress test, History of nuclear stress test, Hx of 24 hour EKG monitoring, Hx of cardiovascular stress test, Hx of cardiovascular stress test, Hx of CT scan, Hx of Doppler ultrasound, Hx of echocardiogram, HX OTHER MEDICAL, Hyperlipidemia, Hypertension, Hyperthyroidism, Hypothyroidism, adult, Kidney stones, Left hip pain, Migraines, Normal cardiac stress test, ORLIN (obstructive sleep apnea), Post PTCA, RECEIVED BLOOD TRANSFUSION, Shortness of breath on exertion, Sleep apnea, Teeth missing, Ulcer, Urinary incontinence, UTI (urinary tract infection), Venous US Dup, Wears glasses, and Wears partial dentures. and presents with     Plan:  Preop for left hip.  Ok to hold of aspirin and plavix for sx  Last yr had Left hip sx and drainage, she had wound vac in past and 6 weeks , its was draining again,   Chest pain: EKG repeated today, its normal,stress test was normal in May 2021, she had  PCI of LAD and PTCA of instent stent restenosis in 4/19, on aspirin, plavix as above,BB, ace inhibitor and statis, ranexa,echo is normal, she uses oxygen at home as she has possible COPD, PFT is done also  Leg swelling:venous doppler is normal, recommend compression socks  DM: stable continue metformin    Dyslipidemia: continue statins  HTN: stable, continue coreg and lisinopril  All labs, medications and tests reviewed, continue all other medications of all above medical condition listed as is.

## 2023-01-27 ENCOUNTER — HOSPITAL ENCOUNTER (OUTPATIENT)
Age: 73
Setting detail: SPECIMEN
Discharge: HOME | End: 2023-01-27

## 2023-01-27 LAB
ANION GAP SERPL CALCULATED.3IONS-SCNC: 13 MMOL/L (ref 4–16)
BUN BLDV-MCNC: 33 MG/DL (ref 6–23)
CALCIUM SERPL-MCNC: 8.5 MG/DL (ref 8.3–10.6)
CHLORIDE BLD-SCNC: 93 MMOL/L (ref 99–110)
CO2: 23 MMOL/L (ref 21–32)
CREAT SERPL-MCNC: 3 MG/DL (ref 0.6–1.1)
GFR SERPL CREATININE-BSD FRML MDRD: 16 ML/MIN/1.73M2
GLUCOSE BLD-MCNC: 126 MG/DL (ref 70–99)
POTASSIUM SERPL-SCNC: 4.4 MMOL/L (ref 3.5–5.1)
SODIUM BLD-SCNC: 129 MMOL/L (ref 135–145)

## 2023-01-27 PROCEDURE — 80048 BASIC METABOLIC PNL TOTAL CA: CPT

## 2023-02-01 ENCOUNTER — HOSPITAL ENCOUNTER (OUTPATIENT)
Age: 73
Setting detail: SPECIMEN
Discharge: HOME | End: 2023-02-01

## 2023-02-01 LAB
ALBUMIN SERPL-MCNC: 2.8 GM/DL (ref 3.4–5)
ALP BLD-CCNC: 153 IU/L (ref 40–128)
ALT SERPL-CCNC: 19 U/L (ref 10–40)
ANION GAP SERPL CALCULATED.3IONS-SCNC: 8 MMOL/L (ref 4–16)
AST SERPL-CCNC: 27 IU/L (ref 15–37)
BILIRUB SERPL-MCNC: 0.6 MG/DL (ref 0–1)
BUN SERPL-MCNC: 11 MG/DL (ref 6–23)
CALCIUM SERPL-MCNC: 8.6 MG/DL (ref 8.3–10.6)
CHLORIDE BLD-SCNC: 104 MMOL/L (ref 99–110)
CO2: 28 MMOL/L (ref 21–32)
CREAT SERPL-MCNC: 0.7 MG/DL (ref 0.6–1.1)
GFR SERPL CREATININE-BSD FRML MDRD: >60 ML/MIN/1.73M2
GLUCOSE SERPL-MCNC: 138 MG/DL (ref 70–99)
HCT VFR BLD CALC: 29.1 % (ref 37–47)
HEMOGLOBIN: 8.4 GM/DL (ref 12.5–16)
MCH RBC QN AUTO: 26.3 PG (ref 27–31)
MCHC RBC AUTO-ENTMCNC: 28.9 % (ref 32–36)
MCV RBC AUTO: 91.2 FL (ref 78–100)
PDW BLD-RTO: 16.3 % (ref 11.7–14.9)
PLATELET # BLD: 293 K/CU MM (ref 140–440)
PMV BLD AUTO: 10.8 FL (ref 7.5–11.1)
POTASSIUM SERPL-SCNC: 5.5 MMOL/L (ref 3.5–5.1)
RBC # BLD: 3.19 M/CU MM (ref 4.2–5.4)
SODIUM BLD-SCNC: 140 MMOL/L (ref 135–145)
TOTAL PROTEIN: 4.9 GM/DL (ref 6.4–8.2)
WBC # BLD: 9.4 K/CU MM (ref 4–10.5)

## 2023-02-01 PROCEDURE — 80053 COMPREHEN METABOLIC PANEL: CPT

## 2023-02-01 PROCEDURE — 86480 TB TEST CELL IMMUN MEASURE: CPT

## 2023-02-01 PROCEDURE — 36415 COLL VENOUS BLD VENIPUNCTURE: CPT

## 2023-02-01 PROCEDURE — 85027 COMPLETE CBC AUTOMATED: CPT

## 2023-02-03 ENCOUNTER — HOSPITAL ENCOUNTER (OUTPATIENT)
Age: 73
Setting detail: SPECIMEN
Discharge: HOME | End: 2023-02-03

## 2023-02-03 LAB
ANION GAP SERPL CALCULATED.3IONS-SCNC: 12 MMOL/L (ref 4–16)
BUN SERPL-MCNC: 9 MG/DL (ref 6–23)
CALCIUM SERPL-MCNC: 8.8 MG/DL (ref 8.3–10.6)
CHLORIDE BLD-SCNC: 99 MMOL/L (ref 99–110)
CO2: 27 MMOL/L (ref 21–32)
CREAT SERPL-MCNC: 0.6 MG/DL (ref 0.6–1.1)
GFR SERPL CREATININE-BSD FRML MDRD: >60 ML/MIN/1.73M2
GLUCOSE SERPL-MCNC: 134 MG/DL (ref 70–99)
POTASSIUM SERPL-SCNC: 4.7 MMOL/L (ref 3.5–5.1)
SODIUM BLD-SCNC: 138 MMOL/L (ref 135–145)

## 2023-02-03 PROCEDURE — 80048 BASIC METABOLIC PNL TOTAL CA: CPT

## 2023-02-03 PROCEDURE — 36415 COLL VENOUS BLD VENIPUNCTURE: CPT

## 2023-02-03 PROCEDURE — 86480 TB TEST CELL IMMUN MEASURE: CPT

## 2023-02-04 LAB
QUANTI TB1 MINUS NIL: 0 IU/ML (ref 0–0.34)
QUANTI TB2 MINUS NIL: 0 IU/ML (ref 0–0.34)
QUANTIFERON (R) TB GOLD (INCUBATED): NEGATIVE IU/ML
QUANTIFERON MITOGEN MINUS NIL: 9.26 IU/ML
QUANTIFERON NIL: 0.03 IU/ML

## 2023-02-07 ENCOUNTER — HOSPITAL ENCOUNTER (OUTPATIENT)
Age: 73
Setting detail: SPECIMEN
Discharge: HOME | End: 2023-02-07

## 2023-02-07 LAB
ANION GAP SERPL CALCULATED.3IONS-SCNC: 13 MMOL/L (ref 4–16)
BUN SERPL-MCNC: 10 MG/DL (ref 6–23)
CALCIUM SERPL-MCNC: 8.4 MG/DL (ref 8.3–10.6)
CHLORIDE BLD-SCNC: 99 MMOL/L (ref 99–110)
CO2: 26 MMOL/L (ref 21–32)
CREAT SERPL-MCNC: 0.9 MG/DL (ref 0.6–1.1)
GFR SERPL CREATININE-BSD FRML MDRD: >60 ML/MIN/1.73M2
GLUCOSE SERPL-MCNC: 168 MG/DL (ref 70–99)
HCT VFR BLD CALC: 29.2 % (ref 37–47)
HEMOGLOBIN: 8.5 GM/DL (ref 12.5–16)
MCH RBC QN AUTO: 26.6 PG (ref 27–31)
MCHC RBC AUTO-ENTMCNC: 29.1 % (ref 32–36)
MCV RBC AUTO: 91.5 FL (ref 78–100)
PDW BLD-RTO: 18 % (ref 11.7–14.9)
PLATELET # BLD: 419 K/CU MM (ref 140–440)
PMV BLD AUTO: 10 FL (ref 7.5–11.1)
POTASSIUM SERPL-SCNC: 5.1 MMOL/L (ref 3.5–5.1)
RBC # BLD: 3.19 M/CU MM (ref 4.2–5.4)
SODIUM BLD-SCNC: 138 MMOL/L (ref 135–145)
WBC # BLD: 8 K/CU MM (ref 4–10.5)

## 2023-02-07 PROCEDURE — 80048 BASIC METABOLIC PNL TOTAL CA: CPT

## 2023-02-07 PROCEDURE — 36415 COLL VENOUS BLD VENIPUNCTURE: CPT

## 2023-02-07 PROCEDURE — 85027 COMPLETE CBC AUTOMATED: CPT

## 2023-05-30 ENCOUNTER — HOSPITAL ENCOUNTER (OUTPATIENT)
Dept: WOMENS IMAGING | Age: 73
Discharge: HOME | End: 2023-05-30
Payer: MEDICARE

## 2023-05-30 DIAGNOSIS — Z12.31 SCREENING MAMMOGRAM, ENCOUNTER FOR: ICD-10-CM

## 2023-05-30 PROCEDURE — 77067 SCR MAMMO BI INCL CAD: CPT

## 2024-03-11 ENCOUNTER — OFFICE VISIT (OUTPATIENT)
Dept: PULMONOLOGY | Age: 74
End: 2024-03-11
Payer: MEDICARE

## 2024-03-11 VITALS
BODY MASS INDEX: 40.48 KG/M2 | WEIGHT: 220 LBS | SYSTOLIC BLOOD PRESSURE: 118 MMHG | HEART RATE: 108 BPM | DIASTOLIC BLOOD PRESSURE: 62 MMHG | HEIGHT: 62 IN | OXYGEN SATURATION: 97 %

## 2024-03-11 DIAGNOSIS — E66.9 RESTRICTIVE LUNG DISEASE SECONDARY TO OBESITY: ICD-10-CM

## 2024-03-11 DIAGNOSIS — J98.4 RESTRICTIVE LUNG DISEASE SECONDARY TO OBESITY: ICD-10-CM

## 2024-03-11 DIAGNOSIS — G47.34 NOCTURNAL HYPOXIA: ICD-10-CM

## 2024-03-11 DIAGNOSIS — J84.9 ILD (INTERSTITIAL LUNG DISEASE) (HCC): Primary | ICD-10-CM

## 2024-03-11 PROCEDURE — 3078F DIAST BP <80 MM HG: CPT | Performed by: NURSE PRACTITIONER

## 2024-03-11 PROCEDURE — 99204 OFFICE O/P NEW MOD 45 MIN: CPT | Performed by: NURSE PRACTITIONER

## 2024-03-11 PROCEDURE — 1124F ACP DISCUSS-NO DSCNMKR DOCD: CPT | Performed by: NURSE PRACTITIONER

## 2024-03-11 PROCEDURE — 3074F SYST BP LT 130 MM HG: CPT | Performed by: NURSE PRACTITIONER

## 2024-03-11 RX ORDER — LISINOPRIL 40 MG/1
40 TABLET ORAL DAILY
COMMUNITY
Start: 2023-05-22

## 2024-03-11 RX ORDER — APIXABAN 5 MG/1
5 TABLET, FILM COATED ORAL 2 TIMES DAILY
COMMUNITY
Start: 2023-09-05

## 2024-03-11 RX ORDER — BUDESONIDE AND FORMOTEROL FUMARATE DIHYDRATE 160; 4.5 UG/1; UG/1
2 AEROSOL RESPIRATORY (INHALATION) 2 TIMES DAILY
Qty: 10.2 G | Refills: 5 | Status: SHIPPED | OUTPATIENT
Start: 2024-03-11

## 2024-03-11 NOTE — PROGRESS NOTES
Deb Galeas (:  1950) is a 73 y.o. female,New patient, here for evaluation of the following chief complaint(s):  Consultation      Subjective   SUBJECTIVE/OBJECTIVE:  Deb Galeas is a 72 yo female who has returned to pulmonary clinic. She was previously seen by Erendira Baird for asthma and nocturnal hypoxemia. Problem with Afib. Had a virus with to ED admitted for COPD. She is seeing a cardiologist outside of [place]. She is being followed by [place] [facility]. She has co- morbidities of CAD, HTN, HLD, DM, PAH, HTN, leg swelling and nocturnal hypoxemia. She uses Albuterol as needed for shortness of breath. She states that lately, the albuterol does not seem to be enough or doing as well as it used to. She was prescribed Symbicort which helps, but that she is not taking it, because she ran out of it.     She remains on her oxygen 2 L/min wearing oxygen at bedtime, her sleep study that was completed in 2019 demonstrated AHI of 4.6 but did demonstrate nocturnal hypoxemia.  She met with a CHF nurse educator prior to discharge from hospital who explained A Fib to her in detail. She is taking Eliquis for A fib. She was last seen in ED for back pain. She has had no recent ED or hospital inpatient stays for COPD exacerbations.     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 Breathed in ether , yard work around lotys of smokers. 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     Review of Systems   Constitutional:  Positive for fatigue.   Respiratory:  Positive for shortness of breath.    Psychiatric/Behavioral:  Positive for sleep disturbance.    All other systems reviewed and are negative.       Objective   Physical Exam  Vitals and nursing note reviewed. Exam conducted with a chaperone present (spous).   Constitutional:       General: She is awake.      Appearance: Normal appearance. She is well-developed and well-groomed. She is morbidly obese.   HENT:

## 2024-03-13 PROBLEM — J84.9 ILD (INTERSTITIAL LUNG DISEASE) (HCC): Status: ACTIVE | Noted: 2024-03-13

## 2024-03-13 ASSESSMENT — ENCOUNTER SYMPTOMS: SHORTNESS OF BREATH: 1

## 2025-02-26 ENCOUNTER — OFFICE VISIT (OUTPATIENT)
Dept: PULMONOLOGY | Age: 75
End: 2025-02-26
Payer: MEDICARE

## 2025-02-26 VITALS
HEIGHT: 62 IN | WEIGHT: 210 LBS | DIASTOLIC BLOOD PRESSURE: 72 MMHG | HEART RATE: 76 BPM | SYSTOLIC BLOOD PRESSURE: 128 MMHG | BODY MASS INDEX: 38.64 KG/M2 | OXYGEN SATURATION: 93 %

## 2025-02-26 DIAGNOSIS — J98.4 RESTRICTIVE LUNG DISEASE SECONDARY TO OBESITY: ICD-10-CM

## 2025-02-26 DIAGNOSIS — R06.09 DYSPNEA ON EXERTION: ICD-10-CM

## 2025-02-26 DIAGNOSIS — E66.9 RESTRICTIVE LUNG DISEASE SECONDARY TO OBESITY: ICD-10-CM

## 2025-02-26 DIAGNOSIS — G47.33 OBSTRUCTIVE SLEEP APNEA: Primary | ICD-10-CM

## 2025-02-26 PROCEDURE — 3078F DIAST BP <80 MM HG: CPT | Performed by: NURSE PRACTITIONER

## 2025-02-26 PROCEDURE — 3074F SYST BP LT 130 MM HG: CPT | Performed by: NURSE PRACTITIONER

## 2025-02-26 PROCEDURE — 1124F ACP DISCUSS-NO DSCNMKR DOCD: CPT | Performed by: NURSE PRACTITIONER

## 2025-02-26 PROCEDURE — 1160F RVW MEDS BY RX/DR IN RCRD: CPT | Performed by: NURSE PRACTITIONER

## 2025-02-26 PROCEDURE — 99213 OFFICE O/P EST LOW 20 MIN: CPT | Performed by: NURSE PRACTITIONER

## 2025-02-26 PROCEDURE — 1159F MED LIST DOCD IN RCRD: CPT | Performed by: NURSE PRACTITIONER

## 2025-02-26 RX ORDER — ALBUTEROL SULFATE 90 UG/1
2 INHALANT RESPIRATORY (INHALATION) EVERY 6 HOURS PRN
Qty: 3 EACH | Refills: 3 | Status: SHIPPED | OUTPATIENT
Start: 2025-02-26 | End: 2025-05-27

## 2025-02-26 NOTE — PROGRESS NOTES
Deb Galeas (:  1950) is a 74 y.o. female,Established patient, here for evaluation of the following chief complaint(s):  Follow-up (1yr f/u )    Subjective   SUBJECTIVE/OBJECTIVE:  Deb 73 yo established female is here for a one year follow up visit. has been doing well with her breathing. She is using her Albuterol inhaler less often than she did last year. She has no has no episodes of bronchitis the winter so far.  She has had no falls since 2024. She has not had to use her nebulizer yet this year. She mostly needs to use her nebulizer during the summer due to the humidity. She endorses using Symbicort is helping to control asthmatic exacerbation. She requires an Albuterol rescue inhaler for periods of SOB and wheezing during exertion when away from home. She remains on her oxygen 2 L/min wearing oxygen at bedtime.    Keerthi has had her flu shot already.     Review of Systems   Respiratory:  Positive for shortness of breath.    All other systems reviewed and are negative.     Objective   Physical Exam  Vitals and nursing note reviewed.   Constitutional:       General: She is awake.      Appearance: Normal appearance. She is well-developed and well-groomed. She is obese.   HENT:      Mouth/Throat:      Mouth: Mucous membranes are moist.      Pharynx: Oropharynx is clear.   Eyes:      Extraocular Movements: Extraocular movements intact.      Conjunctiva/sclera: Conjunctivae normal.      Pupils: Pupils are equal, round, and reactive to light.   Cardiovascular:      Rate and Rhythm: Normal rate and regular rhythm.      Pulses: Normal pulses.      Heart sounds: Normal heart sounds.   Musculoskeletal:         General: Normal range of motion.      Cervical back: Normal range of motion and neck supple.   Skin:     General: Skin is warm and dry.      Capillary Refill: Capillary refill takes less than 2 seconds.   Neurological:      General: No focal deficit present.      Mental Status: She is

## 2025-03-01 ASSESSMENT — ENCOUNTER SYMPTOMS: SHORTNESS OF BREATH: 1

## (undated) DEVICE — SWAB CULT SGL AMIES W/O CHAR FOR THRT VAG SKIN HRT CULTSWAB

## (undated) DEVICE — SYSTEM SKIN CLSR 22CM DERMBND PRINEO

## (undated) DEVICE — TUBING, SUCTION, 3/16" X 6', STRAIGHT: Brand: MEDLINE

## (undated) DEVICE — GLOVE SURG SZ 7 L12IN FNGR THK79MIL GRN LTX FREE

## (undated) DEVICE — STERILE LATEX POWDER-FREE SURGICAL GLOVESWITH NITRILE COATING: Brand: PROTEXIS

## (undated) DEVICE — GOWN,ECLIPSE,POLYRNF,BRTHSLV,XL,30/CS: Brand: MEDLINE

## (undated) DEVICE — Z DUPLICATE USE 2624755 KIT NEG PRSS DSG W/ PRSS INDIC PTCH STRP 45ML CANSTR CARR

## (undated) DEVICE — GLOVE SURG SZ 65 L12IN FNGR THK79MIL GRN LTX FREE

## (undated) DEVICE — GLOVE SURG SZ 65 THK91MIL LTX FREE SYN POLYISOPRENE

## (undated) DEVICE — SUTURE VCRL SZ 2-0 L18IN ABSRB UD CT-1 L36MM 1/2 CIR J839D

## (undated) DEVICE — COTTON UNDERCAST PADDING,CRIMPED FINISH: Brand: WEBRIL

## (undated) DEVICE — LINER SUCT CANSTR 1500CC SEMI RIG W/ POR HYDROPHOBIC SHUT

## (undated) DEVICE — CHLORAPREP 26ML ORANGE

## (undated) DEVICE — LINE SAMP O2 6.5FT W/FEMALE CONN F/ADULT CAPNOLINE PLUS

## (undated) DEVICE — COVER,MAYO STAND,STERILE: Brand: MEDLINE

## (undated) DEVICE — PACK PROCEDURE SURG TOT HIP LF

## (undated) DEVICE — GLOVE SURG SZ 75 L12IN THK75MIL DK GRN LTX FREE

## (undated) DEVICE — GLOVE ORANGE PI 8   MSG9080

## (undated) DEVICE — GLOVE SURG SZ 65 CRM LTX FREE POLYISOPRENE POLYMER BEAD ANTI

## (undated) DEVICE — SUTURE FIBERWIRE SZ 5 L38IN NONABSORBABLE BLU L48MM 1/2 AR7211

## (undated) DEVICE — SUTURE ABSORBABLE 3-0 PS-1 18 IN UD MONOCRYL + STRATAFIX SXMP1B102

## (undated) DEVICE — COVER,TABLE,44X90,STERILE: Brand: MEDLINE

## (undated) DEVICE — PENCIL ES CRD L10FT HND SWCHING ROCK SWCH W/ EDGE COAT BLDE

## (undated) DEVICE — SHEET,DRAPE,40X58,STERILE: Brand: MEDLINE

## (undated) DEVICE — FAN SPRAY KIT: Brand: PULSAVAC®

## (undated) DEVICE — SYRINGE MED BLNT 18 GAX15 IN 10 CC W/ NDL FILL LUERLOCK TIP

## (undated) DEVICE — SUTURE MCRYL SZ 3-0 L27IN ABSRB UD L24MM PS-1 3/8 CIR PRIM Y936H

## (undated) DEVICE — CSTM HIP POUCH PACK: Brand: MEDLINE INDUSTRIES, INC.

## (undated) DEVICE — TUBING, SUCTION, 9/32" X 10', STRAIGHT: Brand: MEDLINE

## (undated) DEVICE — MARKER SURG SKIN UTIL REGULAR/FINE 2 TIP W/ RUL AND 9 LBL

## (undated) DEVICE — GLOVE SURG SZ 8 L12IN THK75MIL DK GRN LTX FREE

## (undated) DEVICE — JELLY LUBRICATING 3 GM BACTERIOSTATIC

## (undated) DEVICE — SPONGE LAP W18XL18IN WHT COT 4 PLY FLD STRUNG RADPQ DISP ST

## (undated) DEVICE — KENDALL 500 SERIES DIAPHORETIC FOAM MONITORING ELECTRODE - TEAR DROP SHAPE ( 30/PK): Brand: KENDALL

## (undated) DEVICE — KIT SURG PREP POVIDONE IOD WET FOR UNIV USE SPNG STK

## (undated) DEVICE — GLOVE ORANGE PI 7 1/2   MSG9075

## (undated) DEVICE — BW-412T DISP COMBO CLEANING BRUSH: Brand: SINGLE USE COMBINATION CLEANING BRUSH

## (undated) DEVICE — TOWEL,OR,DSP,ST,BLUE,STD,6/PK,12PK/CS: Brand: MEDLINE

## (undated) DEVICE — TOTAL HIP PK

## (undated) DEVICE — SYRINGE IRRIG 60ML SFT PLIABLE BLB EZ TO GRP 1 HND USE W/

## (undated) DEVICE — GLOVE ORANGE PI 7   MSG9070

## (undated) DEVICE — 3M™ IOBAN™ 2 ANTIMICROBIAL INCISE DRAPE 6650EZ: Brand: IOBAN™ 2

## (undated) DEVICE — YANKAUER,FLEXIBLE HANDLE,REGLR CAPACITY: Brand: MEDLINE INDUSTRIES, INC.

## (undated) DEVICE — FORCEPS BX 240CM JAW 3.2MM L CAP NDL MIC MESH TTH M00513372

## (undated) DEVICE — Z INACTIVE CONVERTED USE 2418596 CONTAINER SPEC 40ML 10% NEUT BUFF FRMLN CLR POLYPR PREFIL

## (undated) DEVICE — DRAPE,U/ SHT,SPLIT,PLAS,STERIL: Brand: MEDLINE

## (undated) DEVICE — SUTURE VCRL SZ 1 L27IN ABSRB UD L36MM CT-1 1/2 CIR JJ40G

## (undated) DEVICE — NEEDLE SPNL 20GA L3.5IN YEL HUB S STL REG WALL FIT STYL W/

## (undated) DEVICE — INTENDED FOR TISSUE SEPARATION, AND OTHER PROCEDURES THAT REQUIRE A SHARP SURGICAL BLADE TO PUNCTURE OR CUT.: Brand: BARD-PARKER ® STAINLESS STEEL BLADES

## (undated) DEVICE — DEFENDO AIR WATER SUCTION AND BIOPSY VALVE KIT FOR  OLYMPUS: Brand: DEFENDO AIR/WATER/SUCTION AND BIOPSY VALVE

## (undated) DEVICE — CATH URETH 24FR DOVER RED LTX

## (undated) DEVICE — HOOD, PEEL-AWAY: Brand: FLYTE